# Patient Record
Sex: MALE | Race: OTHER | HISPANIC OR LATINO | Employment: OTHER | URBAN - METROPOLITAN AREA
[De-identification: names, ages, dates, MRNs, and addresses within clinical notes are randomized per-mention and may not be internally consistent; named-entity substitution may affect disease eponyms.]

---

## 2021-09-02 ENCOUNTER — APPOINTMENT (OUTPATIENT)
Dept: LAB | Facility: CLINIC | Age: 66
End: 2021-09-02
Payer: MEDICARE

## 2021-12-23 ENCOUNTER — HOSPITAL ENCOUNTER (OUTPATIENT)
Dept: RADIOLOGY | Facility: HOSPITAL | Age: 66
Discharge: HOME | End: 2021-12-23
Payer: MEDICARE

## 2021-12-23 ENCOUNTER — OFFICE VISIT (OUTPATIENT)
Dept: OBGYN CLINIC | Facility: CLINIC | Age: 66
End: 2021-12-23
Payer: MEDICARE

## 2021-12-23 VITALS — BODY MASS INDEX: 25.15 KG/M2 | HEIGHT: 67 IN | WEIGHT: 160.2 LBS

## 2021-12-23 DIAGNOSIS — M17.12 PRIMARY OSTEOARTHRITIS OF LEFT KNEE: ICD-10-CM

## 2021-12-23 DIAGNOSIS — M25.562 LEFT KNEE PAIN, UNSPECIFIED CHRONICITY: Primary | ICD-10-CM

## 2021-12-23 DIAGNOSIS — M25.562 LEFT KNEE PAIN, UNSPECIFIED CHRONICITY: ICD-10-CM

## 2021-12-23 PROCEDURE — 73562 X-RAY EXAM OF KNEE 3: CPT

## 2021-12-23 PROCEDURE — 20610 DRAIN/INJ JOINT/BURSA W/O US: CPT | Performed by: PHYSICIAN ASSISTANT

## 2021-12-23 PROCEDURE — 99203 OFFICE O/P NEW LOW 30 MIN: CPT | Performed by: PHYSICIAN ASSISTANT

## 2021-12-23 RX ORDER — ATORVASTATIN CALCIUM 80 MG/1
80 TABLET, FILM COATED ORAL
COMMUNITY
End: 2022-04-06

## 2021-12-23 RX ORDER — TRIAMCINOLONE ACETONIDE 40 MG/ML
80 INJECTION, SUSPENSION INTRA-ARTICULAR; INTRAMUSCULAR
Status: COMPLETED | OUTPATIENT
Start: 2021-12-23 | End: 2021-12-23

## 2021-12-23 RX ORDER — BUPIVACAINE HYDROCHLORIDE 5 MG/ML
2 INJECTION, SOLUTION EPIDURAL; INTRACAUDAL
Status: COMPLETED | OUTPATIENT
Start: 2021-12-23 | End: 2021-12-23

## 2021-12-23 RX ORDER — CYCLOBENZAPRINE HCL 10 MG
10 TABLET ORAL DAILY PRN
COMMUNITY
Start: 2021-11-12

## 2021-12-23 RX ORDER — ESCITALOPRAM OXALATE 20 MG/1
20 TABLET ORAL
COMMUNITY

## 2021-12-23 RX ORDER — BUPROPION HYDROCHLORIDE 150 MG/1
150 TABLET ORAL
COMMUNITY

## 2021-12-23 RX ORDER — LOSARTAN POTASSIUM 50 MG/1
100 TABLET ORAL
COMMUNITY

## 2021-12-23 RX ORDER — MELOXICAM 15 MG/1
15 TABLET ORAL
COMMUNITY
End: 2022-02-12 | Stop reason: SDUPTHER

## 2021-12-23 RX ORDER — LANOLIN ALCOHOL/MO/W.PET/CERES
3 CREAM (GRAM) TOPICAL
COMMUNITY

## 2021-12-23 RX ORDER — LIDOCAINE HYDROCHLORIDE 10 MG/ML
2 INJECTION, SOLUTION INFILTRATION; PERINEURAL
Status: COMPLETED | OUTPATIENT
Start: 2021-12-23 | End: 2021-12-23

## 2021-12-23 RX ORDER — AMLODIPINE BESYLATE 10 MG/1
10 TABLET ORAL EVERY EVENING
COMMUNITY

## 2021-12-23 RX ADMIN — BUPIVACAINE HYDROCHLORIDE 2 ML: 5 INJECTION, SOLUTION EPIDURAL; INTRACAUDAL at 15:52

## 2021-12-23 RX ADMIN — TRIAMCINOLONE ACETONIDE 80 MG: 40 INJECTION, SUSPENSION INTRA-ARTICULAR; INTRAMUSCULAR at 15:52

## 2021-12-23 RX ADMIN — LIDOCAINE HYDROCHLORIDE 2 ML: 10 INJECTION, SOLUTION INFILTRATION; PERINEURAL at 15:52

## 2022-02-12 ENCOUNTER — OFFICE VISIT (OUTPATIENT)
Dept: OBGYN CLINIC | Facility: CLINIC | Age: 67
End: 2022-02-12
Payer: MEDICARE

## 2022-02-12 ENCOUNTER — HOSPITAL ENCOUNTER (OUTPATIENT)
Dept: RADIOLOGY | Facility: HOSPITAL | Age: 67
Discharge: HOME | End: 2022-02-12
Payer: MEDICARE

## 2022-02-12 VITALS — HEART RATE: 105 BPM | OXYGEN SATURATION: 98 % | BODY MASS INDEX: 24.67 KG/M2 | WEIGHT: 157.2 LBS | HEIGHT: 67 IN

## 2022-02-12 DIAGNOSIS — M47.812 SPONDYLOSIS OF CERVICAL REGION WITHOUT MYELOPATHY OR RADICULOPATHY: ICD-10-CM

## 2022-02-12 DIAGNOSIS — M54.2 NECK PAIN: ICD-10-CM

## 2022-02-12 DIAGNOSIS — M54.2 NECK PAIN: Primary | ICD-10-CM

## 2022-02-12 DIAGNOSIS — M62.838 TRAPEZIUS MUSCLE SPASM: ICD-10-CM

## 2022-02-12 PROCEDURE — 72040 X-RAY EXAM NECK SPINE 2-3 VW: CPT

## 2022-02-12 PROCEDURE — 99213 OFFICE O/P EST LOW 20 MIN: CPT | Performed by: PHYSICIAN ASSISTANT

## 2022-02-12 RX ORDER — MELOXICAM 15 MG/1
15 TABLET ORAL DAILY
Qty: 30 TABLET | Refills: 0 | Status: SHIPPED | OUTPATIENT
Start: 2022-02-12 | End: 2022-04-06

## 2022-02-12 NOTE — PROGRESS NOTES
Assessment/Plan   Diagnoses and all orders for this visit:    Neck pain    Trapezius muscle spasm  -     Ambulatory Referral to Physical Therapy; Future  -     meloxicam refilled  -     Ambulatory Referral to Spine & Pain    Spondylosis of cervical region without myelopathy or radiculopathy          Subjective   Patient ID: José Manuel Underwood is a 77 y o  male  Vitals:    02/12/22 1125   Pulse: 105   SpO2: 98%     65yo male comes in for an evaluation of his right neck  There was no specific injury, but he started having pain 2 days after going bowling on 2-4-22  He c/o pain in the right side of the neck, right superior shoulder, and right scapular border  The pain is dull in character, mild in severity, pain does not radiate and is not associated with numbness  He has a history of a previous 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  The following portions of the patient's history were reviewed and updated as appropriate: allergies, current medications, past family history, past medical history, past social history, past surgical history and problem list     Review of Systems  Ortho Exam  Past Medical History:   Diagnosis Date    Hyperlipemia     Hypertension      Past Surgical History:   Procedure Laterality Date    ANTERIOR CRUCIATE LIGAMENT REPAIR Left 2001     No family history on file  Social History     Occupational History    Not on file   Tobacco Use    Smoking status: Never Smoker    Smokeless tobacco: Never Used   Vaping Use    Vaping Use: Never used   Substance and Sexual Activity    Alcohol use: Not on file    Drug use: Not on file    Sexual activity: Not on file       Review of Systems   Constitutional: Negative  HENT: Negative  Eyes: Negative  Respiratory: Negative  Cardiovascular: Negative  Gastrointestinal: Negative  Endocrine: Negative  Genitourinary: Negative  Musculoskeletal: As below      Allergic/Immunologic: Negative  Neurological: Negative  Hematological: Negative  Psychiatric/Behavioral: Negative  Objective   Physical Exam      I have personally reviewed pertinent films in PACS and my interpretation is OA and disc space narrowing at C56, C67  Anterolisthesis of C7 on T1 on xray       · Constitutional: Awake, Alert, Oriented  · Eyes: EOMI  · Psych: Mood and affect appropriate  · Heart: regular rate   · Lungs: No audible wheezing  · Abdomen: No guarding  · Lymph: no lymphedema       right Shoulder:  - Appearance   No swelling, discoloration, deformity, or ecchymosis  - Palpation   No tenderness to palpation of the clavicle, AC joint, biceps tendon, scapula, or proximal humerus, + tenderness: trapezius muscle   - ROM   Full and pain-free active ROM of the shoulder  - Motor   Internal and external rotation 5/5 with shoulder at side, flexion 5/5  - NVI distally         right Neck / CSpine:  - Appearance   No rash, erythema, or ecchymosis  - Palpation   + tenderness to palpation of the Trapezius  - ROM   Full, pain-free active ROM in rotation and in horizontal flexion    ROM limited by pain in flexion and extension   - Motor   5/5 in all UE myotomes  - Sensation   Intact to light touch in all UE dermatomes  - Special Tests   Spurlings negative

## 2022-02-17 ENCOUNTER — EVALUATION (OUTPATIENT)
Dept: PHYSICAL THERAPY | Facility: CLINIC | Age: 67
End: 2022-02-17
Payer: MEDICARE

## 2022-02-17 DIAGNOSIS — M47.812 SPONDYLOSIS OF CERVICAL REGION WITHOUT MYELOPATHY OR RADICULOPATHY: ICD-10-CM

## 2022-02-17 DIAGNOSIS — M62.838 TRAPEZIUS MUSCLE SPASM: ICD-10-CM

## 2022-02-17 DIAGNOSIS — M54.2 NECK PAIN: Primary | ICD-10-CM

## 2022-02-17 PROCEDURE — 97110 THERAPEUTIC EXERCISES: CPT | Performed by: PHYSICAL THERAPIST

## 2022-02-17 PROCEDURE — 97161 PT EVAL LOW COMPLEX 20 MIN: CPT | Performed by: PHYSICAL THERAPIST

## 2022-02-17 NOTE — PROGRESS NOTES
PT Evaluation     Today's date: 2022  Patient name: Sabina Bledsoe  : 1955  MRN: [id number]  Referring provider: Abdon Tanner  Dx:   Encounter Diagnosis     ICD-10-CM    1  Neck pain  M54 2 Ambulatory Referral to Physical Therapy   2  Trapezius muscle spasm  M62 838 Ambulatory Referral to Physical Therapy   3  Spondylosis of cervical region without myelopathy or radiculopathy  M47 812 Ambulatory Referral to Physical Therapy       Start Time: 1800  Stop Time: 1835  Total time in clinic (min): 35 minutes  Assessment  Assessment details: Sabina Bledsoe is a 77 y o  male who presents with complaints of Neck pain  (primary encounter diagnosis)  Trapezius muscle spasm  Spondylosis of cervical region without myelopathy or radiculopathy  No further referral appears necessary at this time based upon examination results  Patient is presenting with rib dysfunction leading to limitations with carrying items, looking in different directions, and sleeping  Prognosis is good given HEP compliance and PT 1-2/wk  Positive prognostic indicators include positive attitude toward recovery  Please contact me if you have any questions or recommendations  Thank you for the opportunity to share in Robin's care  Impairments: abnormal muscle firing, abnormal or restricted ROM, abnormal movement, impaired physical strength, lacks appropriate home exercise program, pain with function and poor posture     Symptom irritability: lowUnderstanding of Dx/Px/POC: good   Prognosis: good    Plan  Patient would benefit from: skilled physical therapy  Planned therapy interventions: joint mobilization, manual therapy, patient education, postural training, strengthening, stretching, therapeutic activities, therapeutic exercise, home exercise program, neuromuscular re-education, flexibility and functional ROM exercises  Frequency: 1-2x    Duration in weeks: 8  Treatment plan discussed with: patient        Subjective Evaluation    History of Present Illness  Mechanism of injury: Patient reports going bowling one day and irritated his shoulder blade in the right side which has now progressed to right side of his neck that can radiate to right supraspinatus region  Patient RHD  Had pain with lifting his arm  In , patient reports that he was lifting equipment and felt a pop in his upper CT-junction region  Symptoms started 2 weeks ago  Symptoms slowly improving at this time  Sleep disturbances reported  Patient denies dizziness, dsyphagia, dysarthria, diplopia, drop attacks, nystagmus, facial numbness, nor nausea  Occasional trouble driving  1 occurrence of headache reported yesterday  Symptoms described as sharp, dull, ache  Pain reported with inhalation  Not a recurrent problem   Quality of life: good    Pain  Current pain ratin  At best pain rating: 3  At worst pain ratin  Progression: no change      Diagnostic Tests  X-ray: normal  Treatments  Previous treatment: medication  Patient Goals  Patient goals for therapy: decreased pain and increased strength  Patient goal: no pain with carrying items or sleeping      Objective    Cervical % of normal   Flex  50p! Extn  100   SB Left 75   SB Right 75   ROT Left 75   ROT Right 50p! Repetitive testing: extension= no effect             MMT         AROM          PROM    Shoulder       R       L        R          L        R        L   Flex  5 5 WNL WNL     Extn  5 5 WNL WNL     Abd  5 5 WNL WNL     Add  5 5       IR  5 5       ER  5 5 WNL WNL     Behind back IR   L1 T8              Low Trap 4 4+       Mid Trap 4- 4+                             MMT    Elbow       R       L   Flex  5 5   Extn  5 5   Sup  5 5   Pron  5 5              MMT    Wrist       R         L   Flex  5 5   Extn  5 5    bent 5 5     Head positioning:  WNL  Palpation:  Ribs 2-3 right  Scapular positioning: bilateral abduction    No cervical special testing performed    Cervical joint mobility: deferred    Thoracic mobility: decreased upper thoracic    Rib mobility: right 2nd and 3rd rib hypomobility      Insurance:  AMA/CMS Eval/ Re-eval POC expires Beckie Reyna #/ Referral # Total    Start date  Expiration date Extension  Visit limitation? PT only or  PT+OT? Co-Insurance   CMS 2 17 22 4 14 22                                            AUTH #:  Date          Authed: Used           Remaining             Precautions: HTN  Patient provided verbal consent to treatment plan, grade 5 mobilization, and recommended interventions  Manuals 2 17 22        Visit  1        2nd and 3rd rib p/a gr  5 Right FB                          Neuro Re-Ed                                                                        Ther Ex         Pt edu FB        P/a rib roll 2*10        scap retrac 10x                                                     Ther Activity                             Short Term:  1  Pt will report decreased levels of pain by at least 2 subjective ratings in 4 weeks  2  Pt will demonstrate improved ROM by at least 10 degrees in 4 weeks  3  Pt will demonstrate improved strength by 1/2 grade in 4 weeks  4  Pt will be able to perform head movements in 4 weeks  Long Term:   1  Pt will be independent in their HEP in 8 weeks  2  Pt will be pain free with IADL's in 8 weeks

## 2022-02-21 ENCOUNTER — OFFICE VISIT (OUTPATIENT)
Dept: PHYSICAL THERAPY | Facility: CLINIC | Age: 67
End: 2022-02-21
Payer: MEDICARE

## 2022-02-21 DIAGNOSIS — M62.838 TRAPEZIUS MUSCLE SPASM: ICD-10-CM

## 2022-02-21 DIAGNOSIS — M54.2 NECK PAIN: ICD-10-CM

## 2022-02-21 DIAGNOSIS — M47.812 SPONDYLOSIS OF CERVICAL REGION WITHOUT MYELOPATHY OR RADICULOPATHY: ICD-10-CM

## 2022-02-21 DIAGNOSIS — M54.50 ACUTE RIGHT-SIDED LOW BACK PAIN WITHOUT SCIATICA: Primary | ICD-10-CM

## 2022-02-21 PROCEDURE — 97112 NEUROMUSCULAR REEDUCATION: CPT | Performed by: PHYSICAL THERAPIST

## 2022-02-21 PROCEDURE — 97140 MANUAL THERAPY 1/> REGIONS: CPT | Performed by: PHYSICAL THERAPIST

## 2022-02-21 PROCEDURE — 97110 THERAPEUTIC EXERCISES: CPT | Performed by: PHYSICAL THERAPIST

## 2022-02-21 NOTE — PROGRESS NOTES
Daily Note     Today's date: 2022  Patient name: Jose C Caldwell  : 1955  MRN: [id number]  Referring provider: Monika Martinez  Dx:   Encounter Diagnosis     ICD-10-CM    1  Neck pain  M54 2    2  Trapezius muscle spasm  M62 838    3  Spondylosis of cervical region without myelopathy or radiculopathy  M47 812                   Subjective: Patient reports doing better overall but is having some pain his shoulder blade area  Reports right sided low back pain when standing and with stair navigation  Patient is reporting central low back pain when bending forward  Objective: See treatment diary below  GAIT: WNL  Squat assess: WNL  Heel toe walk: -    Lumbar  % of normal   Flex  50p! Extn  50   SB Left 75p! SB Right 100   ROT Left 100   ROT Right 100           MMT    Hip       L       R   Flex  5 5   Extn  4 4   Abd  5 5   Add  5 5   IR  5 5   ER  5 5                 MMT    Knee         L        R   Flex  5 5   Extn  5 5                     MMT    Ankle       L        R   PF 5 5   DF  5 5   EHL 5 5   Inv  5 5   Ever  5 5     Posture: reduced lumbar lordosis    Slump test: L=  -   R=    -   Straight leg raise:   L= -    R=   -             Transverse abdominis: Bent knee fall out= poor     Hip/SI joint:    Active SLR=  +  Active SLR with stabilization =  +  [place] scan= + Left anterior innominate      Multifidus activation = fair     Joint mobility = decreased L5-S1      Assessment: Tolerated treatment well  Patient is presenting with underlying core weakness leading to low back symptoms at this time  He reported improvement in scapular symptoms post session  Jose C Caldwell is a 77 y o  male who presents with complaints of   No further referral appears necessary at this time based upon examination results  Patient is limited with completing ADLs, performing positional changes, and standing/walking  Prognosis is good given HEP compliance and PT 1-2/wk    Positive prognostic indicators include positive attitude toward recovery  Please contact me if you have any questions or recommendations  Thank you for the opportunity to share in Robin's care  Plan: 4-6 weeks, 1 -2 visits per week  Insurance:  AMA/CMS Eval/ Re-eval POC expires Winter Bernard #/ Referral # Total    Start date  Expiration date Extension  Visit limitation? PT only or  PT+OT? Co-Insurance   CMS 2 17 22 4 14 22                                            AUTH #:  Date          Authed: Used           Remaining             Precautions: HTN  Patient provided verbal consent to treatment plan, grade 5 mobilization, and recommended interventions  Manuals 2 17 22 2 21 22       Visit  1 2       2nd and 3rd rib p/a gr  5 Right FB        Ribs 6 right gr  5  FB       T6-8 gr  5  FB       Rib 6-7 taping  FB                         Neuro Re-Ed         TrA iso   2*10, 5"       TrA BKFO  3*10                                                    Ther Ex         Pt edu FB        P/a rib roll 2*10 2*10       scap retrac 10x 10x       rows  3*10 GTB                                           Ther Activity                             Short Term:  1  Pt will report decreased levels of pain by at least 2 subjective ratings in 4 weeks  2  Pt will demonstrate improved ROM by at least 10 degrees in 4 weeks  3  Pt will demonstrate improved strength by 1/2 grade in 4 weeks  4  Pt will be able to perform head movements in 4 weeks  Long Term:   1  Pt will be independent in their HEP in 8 weeks  2  Pt will be pain free with IADL's in 8 weeks

## 2022-02-23 ENCOUNTER — OFFICE VISIT (OUTPATIENT)
Dept: GASTROENTEROLOGY | Facility: CLINIC | Age: 67
End: 2022-02-23
Payer: MEDICARE

## 2022-02-23 VITALS — HEIGHT: 67 IN | WEIGHT: 155 LBS | BODY MASS INDEX: 24.33 KG/M2

## 2022-02-23 DIAGNOSIS — K21.00 GASTROESOPHAGEAL REFLUX DISEASE WITH ESOPHAGITIS WITHOUT HEMORRHAGE: Primary | ICD-10-CM

## 2022-02-23 DIAGNOSIS — K63.5 POLYP OF COLON, UNSPECIFIED PART OF COLON, UNSPECIFIED TYPE: ICD-10-CM

## 2022-02-23 PROBLEM — Z12.11 COLON CANCER SCREENING: Status: ACTIVE | Noted: 2022-02-23

## 2022-02-23 PROCEDURE — 99203 OFFICE O/P NEW LOW 30 MIN: CPT | Performed by: INTERNAL MEDICINE

## 2022-02-23 RX ORDER — SODIUM, POTASSIUM,MAG SULFATES 17.5-3.13G
SOLUTION, RECONSTITUTED, ORAL ORAL
Qty: 177 ML | Refills: 0 | Status: SHIPPED | OUTPATIENT
Start: 2022-02-23 | End: 2022-04-06

## 2022-02-23 RX ORDER — LIDOCAINE 50 MG/G
PATCH TOPICAL
COMMUNITY
Start: 2021-08-31

## 2022-02-23 RX ORDER — ASPIRIN 81 MG/1
TABLET ORAL
COMMUNITY
Start: 2021-08-31

## 2022-02-23 RX ORDER — MONTELUKAST SODIUM 10 MG/1
10 TABLET ORAL
COMMUNITY

## 2022-02-23 NOTE — PROGRESS NOTES
[facility] - Outpatient Consultation  Mirela Carpio 77 y o  male MRN: [id number]  Encounter: [id number]          ASSESSMENT AND PLAN:      1  Gastroesophageal reflux disease with esophagitis without hemorrhage  Intermittent episodes had esophagitis in the past underwent upper endoscopy elsewhere over 5 years ago was told to return  Denies any dysphagia melena bright red blood per rectum  - EGD; Future    2  Polyp of colon, unspecified part of colon, unspecified type  Distant history of polyps type unknown colonoscopies done elsewhere  He is overdue for his colonoscopy his last was greater than 5 years ago  Denies any bright red blood per rectum abdominal pain  He will otherwise follow-up with us after his testing   - Colonoscopy; Future    ______________________________________________________________________    HPI:  Very pleasant 69-year-old   presents to establish new gastroenterology follow-up  He is new to the area was followed elsewhere  He had EGD for gastroesophageal reflux and a colonoscopy at the same time over 5 years ago  He was told to return in 5 years  He apparently had esophagitis and chronic reflux and has a history of colon polyps  No family history of cancer of the esophagus stomach gallbladder liver pancreas colon  No family history of celiac disease inflammatory bowel disease liver disease or pancreatitis  Has no other particular complaints  Does have a history of having had a coronary artery bypass several years ago has been doing very well since then  He is establishing follow-up with a new cardiologist   No recent labs  Lipids and CMP from September of last year relatively unremarkable  REVIEW OF SYSTEMS:    CONSTITUTIONAL: Denies any fever, chills, rigors, and weight loss  HEENT: No earache or tinnitus  Denies hearing loss or visual disturbances  CARDIOVASCULAR: No chest pain or palpitations     RESPIRATORY: Denies any cough, hemoptysis, shortness of breath or dyspnea on exertion  GASTROINTESTINAL: As noted in the History of Present Illness  GENITOURINARY: No problems with urination  Denies any hematuria or dysuria  NEUROLOGIC: No dizziness or vertigo, denies headaches  MUSCULOSKELETAL: Denies any muscle or joint pain  SKIN: Denies skin rashes or itching  ENDOCRINE: Denies excessive thirst  Denies intolerance to heat or cold  PSYCHOSOCIAL: Denies depression or anxiety  Denies any recent memory loss  Historical Information   Past Medical History:   Diagnosis Date    Hyperlipemia     Hypertension      Past Surgical History:   Procedure Laterality Date    ANTERIOR CRUCIATE LIGAMENT REPAIR Left 2001     Social History   Social History     Substance and Sexual Activity   Alcohol Use Never     Social History     Substance and Sexual Activity   Drug Use Never     Social History     Tobacco Use   Smoking Status Never Smoker   Smokeless Tobacco Never Used     History reviewed  No pertinent family history  Meds/Allergies       Current Outpatient Medications:     aspirin (ECOTRIN LOW STRENGTH) 81 mg EC tablet    hydrocortisone 2 5 % cream    lidocaine (LIDODERM) 5 %    losartan (COZAAR) 50 mg tablet    melatonin 3 mg    meloxicam (MOBIC) 15 mg tablet    metoprolol tartrate (LOPRESSOR) 25 mg tablet    montelukast (SINGULAIR) 10 mg tablet    amLODIPine (NORVASC) 10 mg tablet    atorvastatin (LIPITOR) 80 mg tablet    buPROPion (WELLBUTRIN XL) 150 mg 24 hr tablet    cyclobenzaprine (FLEXERIL) 10 mg tablet    escitalopram (LEXAPRO) 20 mg tablet    Allergies   Allergen Reactions    Bee Pollen Other (See Comments)     Itchy eyes/nose, sneezing, clogged ears, increased cough/chest tightness   Moxifloxacin GI Intolerance     W/ GI bleed      Pollen Extract Other (See Comments)     Itchy eyes/nose, sneezing, clogged ears, increased cough/chest tightness      Lisinopril Cough           Objective     Height 5' 6 5" (1 689 m), weight 70 3 kg (155 lb)  Body mass index is 24 64 kg/m²  PHYSICAL EXAM:      General Appearance:   Alert, cooperative, no distress   HEENT:   Normocephalic, atraumatic, anicteric      Neck:  Supple, symmetrical, trachea midline   Lungs:   Clear to auscultation bilaterally; no rales, rhonchi or wheezing; respirations unlabored , well-healed thoracotomy scar  Heart[de-identified]   Regular rate and rhythm; no murmur, rub, or gallop  Abdomen:   Soft, non-tender, non-distended; normal bowel sounds; no masses, no organomegaly    Genitalia:   Deferred    Rectal:   Deferred    Extremities:  No cyanosis, clubbing or edema    Pulses:  2+ and symmetric    Skin:  No jaundice, rashes, or lesions    Lymph nodes:  No palpable cervical lymphadenopathy        Lab Results:   No visits with results within 1 Day(s) from this visit  Latest known visit with results is:   Transcribe Orders on 09/02/2021   Component Date Value    Sodium 09/02/2021 142     Potassium 09/02/2021 4 3     Chloride 09/02/2021 111*    CO2 09/02/2021 29     ANION GAP 09/02/2021 2*    BUN 09/02/2021 17     Creatinine 09/02/2021 0 88     Glucose, Fasting 09/02/2021 93     Calcium 09/02/2021 8 9     AST 09/02/2021 22     ALT 09/02/2021 35     Alkaline Phosphatase 09/02/2021 83     Total Protein 09/02/2021 7 3     Albumin 09/02/2021 3 6     Total Bilirubin 09/02/2021 0 50     eGFR 09/02/2021 90     Cholesterol 09/02/2021 125     Triglycerides 09/02/2021 64     HDL, Direct 09/02/2021 44     LDL Calculated 09/02/2021 68     Non-HDL-Chol (CHOL-HDL) 09/02/2021 81          Radiology Results:   XR spine cervical 2 or 3 vw injury    Result Date: 2/17/2022  Narrative: CERVICAL SPINE INDICATION:   M54 2: Cervicalgia  COMPARISON:  None VIEWS:  XR SPINE CERVICAL 2 OR 3 VW INJURY FINDINGS: No fracture or subluxation  There is straightening of the normal cervical lordosis  There is mild anterolisthesis of C7-T1   There is disc space narrowing at C5-C6 and C6-C7 with ventral osteophytosis  There is multilevel facet arthrosis  The prevertebral soft tissues are within normal limits  The lung apices are clear  Status post sternotomy  Impression: No acute osseous abnormality  Degenerative changes as above   Workstation performed: [id number]

## 2022-02-23 NOTE — PATIENT INSTRUCTIONS
Scheduled date of EGD/colonoscopy (as of today):  4/13/22  Physician performing EGD/colonoscopy: Cliff Sena  Location of EGD/colonoscopy: [facility]  Desired bowel prep reviewed with patient: Suprep  Instructions reviewed with patient by: Raquel   Clearances:  N/A

## 2022-02-24 ENCOUNTER — OFFICE VISIT (OUTPATIENT)
Dept: PHYSICAL THERAPY | Facility: CLINIC | Age: 67
End: 2022-02-24
Payer: MEDICARE

## 2022-02-24 DIAGNOSIS — M47.812 SPONDYLOSIS OF CERVICAL REGION WITHOUT MYELOPATHY OR RADICULOPATHY: ICD-10-CM

## 2022-02-24 DIAGNOSIS — M62.838 TRAPEZIUS MUSCLE SPASM: ICD-10-CM

## 2022-02-24 DIAGNOSIS — M54.2 NECK PAIN: Primary | ICD-10-CM

## 2022-02-24 DIAGNOSIS — M54.50 ACUTE RIGHT-SIDED LOW BACK PAIN WITHOUT SCIATICA: ICD-10-CM

## 2022-02-24 PROCEDURE — 97110 THERAPEUTIC EXERCISES: CPT | Performed by: PHYSICAL THERAPIST

## 2022-02-24 PROCEDURE — 97112 NEUROMUSCULAR REEDUCATION: CPT | Performed by: PHYSICAL THERAPIST

## 2022-02-24 NOTE — PROGRESS NOTES
Daily Note     Today's date: 2022  Patient name: Oneyda Floor  : 1955  MRN: [id number]  Referring provider: Jareth Jerez  Dx:   Encounter Diagnosis     ICD-10-CM    1  Neck pain  M54 2    2  Trapezius muscle spasm  M62 838    3  Spondylosis of cervical region without myelopathy or radiculopathy  M47 812    4  Acute right-sided low back pain without sciatica  M54 50               Subjective: Patient reports doing much better overall  Reports some achiness in low back 3/10  Objective: See treatment diary below     Assessment: Tolerated treatment well  Patient reported feeling good post treatment today  Cueing needed to avoid pelvic rotation with supine march exercise  Added pallof press and row to HEP  Patient will be away for 2 weeks in [place] and will update me with progress via email  Plan: Continue per plan of care  Insurance:  AMA/CMS Eval/ Re-eval POC expires Val Childs #/ Referral # Total    Start date  Expiration date Extension    CMS 2 17 22 4 14 22  None                      AUTH #:  Date          Authed: Used           Remaining             Precautions: HTN  Patient provided verbal consent to treatment plan, grade 5 mobilization, and recommended interventions  Manuals 2 17 22 2 21 22 2 24 22      Visit  1 2 3      2nd and 3rd rib p/a gr  5 Right FB        Ribs 6 right gr  5  FB       T6-8 gr  5  FB       Rib 6-7 taping  FB                         Neuro Re-Ed         TrA iso   2*10, 5" np      TrA BKFO  3*10 3*10      TrA march   3*10      Bridge   2*10      pallof Press   2*10 ea   GTB                        Ther Ex         Pt edu FB        P/a rib roll 2*10 2*10 np      scap retrac 10x 10x HEP      rows  3*10 GTB 3*10 Blue TB      shld extn   3*10 GTB                                 Ther Activity

## 2022-02-25 ENCOUNTER — APPOINTMENT (OUTPATIENT)
Dept: PHYSICAL THERAPY | Facility: CLINIC | Age: 67
End: 2022-02-25
Payer: MEDICARE

## 2022-03-15 ENCOUNTER — TELEPHONE (OUTPATIENT)
Dept: OTHER | Facility: OTHER | Age: 67
End: 2022-03-15

## 2022-03-15 NOTE — TELEPHONE ENCOUNTER
Patient wife called and canceled her  appointment because conflict of appointment and is asking if the office can give her back a call to reschedule her  appointment

## 2022-03-24 ENCOUNTER — OFFICE VISIT (OUTPATIENT)
Dept: OBGYN CLINIC | Facility: CLINIC | Age: 67
End: 2022-03-24
Payer: MEDICARE

## 2022-03-24 VITALS
DIASTOLIC BLOOD PRESSURE: 67 MMHG | HEART RATE: 88 BPM | WEIGHT: 160.2 LBS | BODY MASS INDEX: 25.15 KG/M2 | HEIGHT: 67 IN | SYSTOLIC BLOOD PRESSURE: 125 MMHG

## 2022-03-24 DIAGNOSIS — M17.12 PRIMARY OSTEOARTHRITIS OF LEFT KNEE: Primary | ICD-10-CM

## 2022-03-24 DIAGNOSIS — Z98.890 HISTORY OF REPAIR OF ACL: ICD-10-CM

## 2022-03-24 PROCEDURE — 99203 OFFICE O/P NEW LOW 30 MIN: CPT | Performed by: ORTHOPAEDIC SURGERY

## 2022-03-24 NOTE — PROGRESS NOTES
Assessment/Plan:  1  Primary osteoarthritis of left knee     2  History of repair of ACL         Scribe Attestation    I,:  Geeta Dhillon am acting as a scribe while in the presence of the attending physician :       I,:  Tian Thomson, DO personally performed the services described in this documentation    as scribed in my presence :             Upon review of the x-rays and my physical examination Shoshana Carter is presenting with signs and symptoms consistent with left knee osteoarthritis that is asymptomatic at this time  His knee is stable to examination  There is no tenderness  The knee is nonswollen and he has no complaints of pain  Moving forward I will gladly manage his osteoarthritis for him  Should he develop a recurrence of knee pain we could certainly were provided another steroid injection due to success full treatment previously  Is steroid injection is unsuccessful we can consider joint lubricating injections  The patient was amenable to this treatment plan  He can follow-up with me as needed  Subjective:   Lori Fernandes is a 77 y o  male who presents to the office today for evaluation of his left knee  Shoshana Carter has a known history of left knee osteoarthritis as well as a history of an ACL reconstruction performed nearly 20 years ago  On December 23rd he was evaluated by Rachel Teague PA-C who diagnosed Shoshana Carter with knee osteoarthritis and provided him a cortisone injection  Shoshana Carter states that since he had his injection his knee pain has decreased and nearly resolved  His only complaint is of a mild ache about the knee when poor weather develops  He denies crepitus  He denies instability of the knee  Daily activities such as ascending and descending stairs are nonpainful  Prolonged standing or walking also are non painful  Review of Systems   Constitutional: Negative for chills, fever and unexpected weight change  HENT: Negative for hearing loss, nosebleeds and sore throat      Eyes: Negative for pain, redness and visual disturbance  Respiratory: Negative for cough, shortness of breath and wheezing  Cardiovascular: Negative for chest pain, palpitations and leg swelling  Gastrointestinal: Negative for abdominal pain, nausea and vomiting  Endocrine: Negative for polyphagia and polyuria  Genitourinary: Negative for dysuria and hematuria  Musculoskeletal:        See HPI   Skin: Negative for rash and wound  Neurological: Negative for dizziness, numbness and headaches  Psychiatric/Behavioral: Negative for decreased concentration and suicidal ideas  The patient is not nervous/anxious  Past Medical History:   Diagnosis Date    Hyperlipemia     Hypertension        Past Surgical History:   Procedure Laterality Date    ANTERIOR CRUCIATE LIGAMENT REPAIR Left 2001       History reviewed  No pertinent family history  Social History     Occupational History    Not on file   Tobacco Use    Smoking status: Never Smoker    Smokeless tobacco: Never Used   Vaping Use    Vaping Use: Never used   Substance and Sexual Activity    Alcohol use: Never    Drug use: Never    Sexual activity: Not on file         Current Outpatient Medications:     amLODIPine (NORVASC) 10 mg tablet, Take 10 mg by mouth  , Disp: , Rfl:     aspirin (ECOTRIN LOW STRENGTH) 81 mg EC tablet, TAKE ONE TABLET BY MOUTH DAILY FOR THE HEART , Disp: , Rfl:     hydrocortisone 2 5 % cream, hydrocortisone 2 5 % topical cream  Apply to forehead twice a day for 3 weeks only, Disp: , Rfl:     lidocaine (LIDODERM) 5 %, APPLY 1 PATCH ON SKIN DAILY FOR PAIN  DO NOT WEAR FOR LONGER THAN 12 HOURS IN ANY 24 HOUR PERIOD   [facility] YOUR HANDS AFTER APPLYING A PATCH , Disp: , Rfl:     losartan (COZAAR) 50 mg tablet, Take 50 mg by mouth, Disp: , Rfl:     melatonin 3 mg, Take 3 mg by mouth, Disp: , Rfl:     meloxicam (MOBIC) 15 mg tablet, Take 1 tablet (15 mg total) by mouth daily, Disp: 30 tablet, Rfl: 0    montelukast (SINGULAIR) 10 mg tablet, Take 10 mg by mouth, Disp: , Rfl:     atorvastatin (LIPITOR) 80 mg tablet, Take 80 mg by mouth, Disp: , Rfl:     buPROPion (WELLBUTRIN XL) 150 mg 24 hr tablet, Take 150 mg by mouth (Patient not taking: Reported on 2/23/2022 ), Disp: , Rfl:     cyclobenzaprine (FLEXERIL) 10 mg tablet, Take 10 mg by mouth daily at bedtime (Patient not taking: Reported on 2/23/2022 ), Disp: , Rfl:     escitalopram (LEXAPRO) 20 mg tablet, Take 20 mg by mouth (Patient not taking: Reported on 2/23/2022 ), Disp: , Rfl:     metoprolol tartrate (LOPRESSOR) 25 mg tablet, Take 25 mg by mouth, Disp: , Rfl:     Na Sulfate-K Sulfate-Mg Sulf (Suprep Bowel Prep Kit) 17 5-3 13-1 6 GM/177ML SOLN, Take 1 bottle by mouth followed by copious water intake the 2nd bottle by mouth the day before colonoscopy (Patient not taking: Reported on 3/24/2022 ), Disp: 177 mL, Rfl: 0    Allergies   Allergen Reactions    Bee Pollen Other (See Comments)     Itchy eyes/nose, sneezing, clogged ears, increased cough/chest tightness   Moxifloxacin GI Intolerance     W/ GI bleed      Pollen Extract Other (See Comments)     Itchy eyes/nose, sneezing, clogged ears, increased cough/chest tightness   Lisinopril Cough       Objective:  Vitals:    03/24/22 1104   BP: 125/67   Pulse: 88       Left Knee Exam     Muscle Strength   The patient has normal left knee strength  Tenderness   The patient is experiencing no tenderness  Range of Motion   Extension: 0   Flexion: 130     Tests   Stephanie:  Medial - negative Lateral - negative  Varus: negative Valgus: negative  Drawer:  Anterior - negative     Posterior - negative    Other   Erythema: absent  Scars: present  Sensation: normal  Swelling: none  Effusion: no effusion present          Observations   Left Knee   Negative for effusion  Physical Exam  Vitals reviewed  Constitutional:       Appearance: He is well-developed  HENT:      Head: Normocephalic and atraumatic     Eyes: General:         Right eye: No discharge  Left eye: No discharge  Conjunctiva/sclera: Conjunctivae normal    Cardiovascular:      Rate and Rhythm: Regular rhythm  Pulmonary:      Effort: Pulmonary effort is normal  No respiratory distress  Musculoskeletal:      Cervical back: Normal range of motion and neck supple  Left knee: No effusion  Instability Tests: Medial Stephanie test negative and lateral Stephanie test negative  Skin:     General: Skin is warm and dry  Neurological:      Mental Status: He is alert and oriented to person, place, and time  Psychiatric:         Behavior: Behavior normal          I have personally reviewed pertinent films in PACS and my interpretation is as follows:  X-rays of the left knee from December 23, 2021 were reviewed and demonstrate mild tricompartmental osteoarthritis  There is evidence of chondrocalcinosis

## 2022-04-06 ENCOUNTER — CONSULT (OUTPATIENT)
Dept: PAIN MEDICINE | Facility: CLINIC | Age: 67
End: 2022-04-06
Payer: MEDICARE

## 2022-04-06 VITALS
BODY MASS INDEX: 25.76 KG/M2 | WEIGHT: 162 LBS | HEART RATE: 84 BPM | SYSTOLIC BLOOD PRESSURE: 146 MMHG | DIASTOLIC BLOOD PRESSURE: 73 MMHG

## 2022-04-06 DIAGNOSIS — M54.2 NECK PAIN: ICD-10-CM

## 2022-04-06 DIAGNOSIS — M51.16 LUMBAR DISC DISEASE WITH RADICULOPATHY: ICD-10-CM

## 2022-04-06 DIAGNOSIS — M50.322 OTHER CERVICAL DISC DEGENERATION AT C5-C6 LEVEL: ICD-10-CM

## 2022-04-06 DIAGNOSIS — G89.4 CHRONIC PAIN SYNDROME: Primary | ICD-10-CM

## 2022-04-06 DIAGNOSIS — M47.816 LUMBAR SPONDYLOSIS: ICD-10-CM

## 2022-04-06 DIAGNOSIS — M47.812 CERVICAL SPONDYLOSIS: ICD-10-CM

## 2022-04-06 PROCEDURE — 99204 OFFICE O/P NEW MOD 45 MIN: CPT | Performed by: PHYSICAL MEDICINE & REHABILITATION

## 2022-04-06 RX ORDER — MELOXICAM 15 MG/1
15 TABLET ORAL DAILY
Qty: 30 TABLET | Refills: 1 | Status: SHIPPED | OUTPATIENT
Start: 2022-04-06 | End: 2022-05-05 | Stop reason: ALTCHOICE

## 2022-04-06 NOTE — PATIENT INSTRUCTIONS
Lumbar Radiculopathy   WHAT YOU NEED TO KNOW:   Lumbar radiculopathy is a painful condition that happens when a nerve in your lumbar spine (lower back) is pinched or irritated  Nerves control feeling and movement in your body  You may have numbness or pain that shoots down from your lower back towards your foot  DISCHARGE INSTRUCTIONS:   Medicines:   · Medicines:     ? NSAIDs , such as ibuprofen, help decrease swelling, pain, and fever  This medicine is available with or without a doctor's order  NSAIDs can cause stomach bleeding or kidney problems in certain people  If you take blood thinner medicine, always ask your healthcare provider if NSAIDs are safe for you  Always read the medicine label and follow directions  ? Muscle relaxers  help decrease pain and muscle spasms  ? Opioids: This is a strong medicine given to reduce severe pain  It is also called narcotic pain medicine  Take this medicine exactly as directed by your healthcare provider  ? Oral steroids: Steroids may also be given to reduce pain and swelling  ? Take your medicine as directed  Contact your healthcare provider if you think your medicine is not helping or if you have side effects  Tell him of her if you are allergic to any medicine  Keep a list of the medicines, vitamins, and herbs you take  Include the amounts, and when and why you take them  Bring the list or the pill bottles to follow-up visits  Carry your medicine list with you in case of an emergency  Follow up with your healthcare provider or spine specialist within 1 to 3 weeks:  After your first follow-up appointment, return to your healthcare provider or spine specialist every 2 weeks until you have healed  Ask for information about physical therapy for your condition  Write down your questions so you remember to ask them during your visits  Physical therapy:  You may need physical therapy to improve your condition   Your physical therapist may teach you certain exercises to improve posture (the way you stand and sit), flexibility, and strength in your lower back  Self care:   · Stay active: It is best to be active when you have lumbar radiculopathy  Your physical therapist or healthcare provider may tell you to take walks to ease yourself back into your daily routine  Avoid long periods of bed rest  Bed rest could worsen your symptoms  Do not move in ways that increase your pain  Ask for more information about the best ways to stay active  · Use ice or heat packs:  Use ice or heat packs as directed on the sore area of your body to decrease the pain and swelling  Put ice in a plastic bag covered with a towel on your low back  Cover heated items with a towel to avoid burns  Use ice and heat as directed  · Avoid heavy lifting: Your condition may worsen if you lift heavy things  Avoid lifting if possible  · Maintain a healthy weight:  Excess body weight may strain your back  Talk with your healthcare provider about ways to lose excess weight if you are overweight  Contact your healthcare provider or spine specialist if:   · Your pain does not improve within 1 to 3 weeks after treatment  · Your pain and weakness keep you from your normal activities at work, home, or school  · You lose more than 10 pounds in 6 months without trying  · You become depressed or sad because of the pain  · You have questions or concerns about your condition or care  Return to the emergency department if:   · You have a fever greater than 100 4°F for longer than 2 days  · You have new, severe back or leg pain, or your pain spreads to both legs  · You have any new signs of numbness or weakness, especially in your lower back, legs, arms, or genital area  · You have new trouble controlling your urine and bowel movements  · You do not feel like your bladder empties when you urinate      © Copyright [employer] 2022 Information is for End User's use only and may not be sold, redistributed or otherwise used for commercial purposes  All illustrations and images included in CareNotes® are the copyrighted property of [employer]  or Jeanne Mcclellan  The above information is an  only  It is not intended as medical advice for individual conditions or treatments  Talk to your doctor, nurse or pharmacist before following any medical regimen to see if it is safe and effective for you

## 2022-04-06 NOTE — PROGRESS NOTES
Assessment  1  Chronic pain syndrome    2  Neck pain    3  Cervical spondylosis    4  Other cervical disc degeneration at C5-C6 level    5  Lumbar disc disease with radiculopathy    6  Lumbar spondylosis        Plan  Mr Tashi Thakkar is a pleasant 60-year-old male significant past medical history of lumbar radiculopathy that was managed at a previous pain practice with L2-L3 lumbar epidurals presents for initial evaluation regarding chronic neck and low back pain  He has already participated in greater than 6 weeks of physical therapy and physician guided home exercises in the last 6 months with minimal relief in his ongoing pain  During today's evaluation he is demonstrating clinical and diagnostic evidence of cervical spondylosis, facet mediated pain and radiculopathy into the right shoulder as well as lumbar radiculopathy with radiating symptoms into the bilateral hips  However, no recent diagnostic imaging has been done  At this time we will   1  Provide the patient with blocks Cam 15 mg daily for pain  2  Will order lumbar MRI without contrast to better identify disc and spine pathology contributing to symptoms  3  Will also order cervical MRI without contrast  4  Again patient is completed greater than 6 weeks of physician guided home exercises as well as physical therapy in the last 6 months with minimal relief in his ongoing pain  5  Will call patient with imaging results and plan moving forward  He would likely benefit from interventional approaches to manage his pain, in particular, would consider lumbar epidurals as well as cervical medial branch blocks versus epidural regarding his ongoing chronic pain     My impressions and treatment recommendations were discussed in detail with the patient who verbalized understanding and had no further questions  Discharge instructions were provided  I personally saw and examined the patient and I agree with the above discussed plan of care      Orders Placed This Encounter   Procedures    MRI cervical spine without contrast     Standing Status:   Future     Standing Expiration Date:   4/6/2026     Scheduling Instructions: There is no preparation for this test  Please leave your jewelry and valuables at home, wedding rings are the exception  Magnetic nail polish must be removed prior to arrival for your test  Please bring your insurance cards, a form of photo ID and a list of your medications with you  Arrive 15 minutes prior to your appointment time in order to register  Please bring any prior CT or MRI studies of this area that were not performed at a [facility]  To schedule this appointment, please contact Central Scheduling at [phone]  Prior to your appointment, please make sure you complete the MRI Screening Form when you e-Check in for your appointment  This will be available starting 7 days before your appointment in [address]  You may receive an e-mail with an activation code if you do not have a [email or web address] account  If you do not have access to a device, we will complete your screening at your appointment  Order Specific Question:   What is the patient's sedation requirement? Answer:   No Sedation     Order Specific Question:   Release to patient through [employer]     Answer:   Immediate     Order Specific Question:   Is order priority selected as STAT? Answer:   No     Order Specific Question:   Reason for Exam (FREE TEXT)     Answer:   Cervical radiculopathy    MRI lumbar spine without contrast     Standing Status:   Future     Standing Expiration Date:   4/6/2026     Scheduling Instructions: There is no preparation for this test  Please leave your jewelry and valuables at home, wedding rings are the exception  Magnetic nail polish must be removed prior to arrival for your test  Please bring your insurance cards, a form of photo ID and a list of your medications with you   Arrive 15 minutes prior to your appointment time in order to register  Please bring any prior CT or MRI studies of this area that were not performed at a [facility] facility  To schedule this appointment, please contact Central Scheduling at [phone]  Prior to your appointment, please make sure you complete the MRI Screening Form when you e-Check in for your appointment  This will be available starting 7 days before your appointment in [address]  You may receive an e-mail with an activation code if you do not have a "[employer] account  If you do not have access to a device, we will complete your screening at your appointment  Order Specific Question:   What is the patient's sedation requirement? Answer:   No Sedation     Order Specific Question:   Release to patient through [employer]     Answer:   Immediate     Order Specific Question:   Is order priority selected as STAT? Answer:   No     Order Specific Question:   Reason for Exam (FREE TEXT)     Answer:   lumbar radic     New Medications Ordered This Visit   Medications    meloxicam (MOBIC) 15 mg tablet     Sig: Take 1 tablet (15 mg total) by mouth daily     Dispense:  30 tablet     Refill:  1       History of Present Illness    Lizzy Rojas is a 77 y o  male presents to [place] [address] [facility] for initial evaluation regarding neck and low back pain of several years duration  Patient denies any significant inciting event or recent trauma  Today reports moderate pain rated 3 to 7/10 and interfering daily activities  Pain is intermittent 30-60% of the time that is present throughout the day and night  Describes symptoms as pressure-like, throbbing, dull aching, sharp pain  Denies any significant weakness or falls  Requires a cane for ambulation  Symptoms are worse with standing, walking  Has had moderate relief with 10s unit and excellent relief with acupuncture  Previously tried meloxicam which did provide some relief    Presents today for initial evaluation  I have personally reviewed and/or updated the patient's past medical history, past surgical history, family history, social history, current medications, allergies, and vital signs today  Review of Systems   Constitutional: Negative for fever and unexpected weight change  HENT: Negative for trouble swallowing  Eyes: Negative for visual disturbance  Respiratory: Negative for shortness of breath and wheezing  Cardiovascular: Positive for chest pain  Negative for palpitations  Gastrointestinal: Negative for constipation, diarrhea, nausea and vomiting  Endocrine: Negative for cold intolerance, heat intolerance and polydipsia  Genitourinary: Negative for difficulty urinating and frequency  Musculoskeletal: Positive for gait problem and joint swelling  Negative for arthralgias and myalgias  Skin: Negative for rash  Neurological: Negative for dizziness, seizures, syncope, weakness and headaches  Hematological: Does not bruise/bleed easily  Psychiatric/Behavioral: Negative for dysphoric mood  All other systems reviewed and are negative  Patient Active Problem List   Diagnosis    Colon cancer screening    Colon polyps    Gastroesophageal reflux disease with esophagitis       Past Medical History:   Diagnosis Date    Coronary artery disease     Hyperlipemia     Hypertension     Irregular heart beat     bradycardia       Past Surgical History:   Procedure Laterality Date    ANTERIOR CRUCIATE LIGAMENT REPAIR Left 2001    CARDIAC LOOP RECORDER  03/2022    MD from [address] possible pacer placement   [address]  2007    CABG x2       No family history on file      Social History     Occupational History    Not on file   Tobacco Use    Smoking status: Never Smoker    Smokeless tobacco: Never Used   Vaping Use    Vaping Use: Never used   Substance and Sexual Activity    Alcohol use: Never    Drug use: Never    Sexual activity: Not on file       Current Outpatient Medications on File Prior to Visit   Medication Sig    amLODIPine (NORVASC) 10 mg tablet Take 10 mg by mouth every evening      aspirin (ECOTRIN LOW STRENGTH) 81 mg EC tablet TAKE ONE TABLET BY MOUTH DAILY FOR THE HEART   buPROPion (WELLBUTRIN XL) 150 mg 24 hr tablet Take 150 mg by mouth      cyclobenzaprine (FLEXERIL) 10 mg tablet Take 10 mg by mouth daily as needed      escitalopram (LEXAPRO) 20 mg tablet Take 20 mg by mouth      hydrocortisone 2 5 % cream hydrocortisone 2 5 % topical cream   Apply to forehead twice a day for 3 weeks only    lidocaine (LIDODERM) 5 % APPLY 1 PATCH ON SKIN DAILY FOR PAIN  DO NOT WEAR FOR LONGER THAN 12 HOURS IN ANY 24 HOUR PERIOD  [address] YOUR HANDS AFTER APPLYING A PATCH   losartan (COZAAR) 50 mg tablet Take 100 mg by mouth      melatonin 3 mg Take 3 mg by mouth    montelukast (SINGULAIR) 10 mg tablet Take 10 mg by mouth    [DISCONTINUED] atorvastatin (LIPITOR) 80 mg tablet Take 80 mg by mouth    [DISCONTINUED] meloxicam (MOBIC) 15 mg tablet Take 1 tablet (15 mg total) by mouth daily (Patient taking differently: Take 15 mg by mouth daily as needed  )    [DISCONTINUED] metoprolol tartrate (LOPRESSOR) 25 mg tablet Take 25 mg by mouth    [DISCONTINUED] Na Sulfate-K Sulfate-Mg Sulf (Suprep Bowel Prep Kit) 17 5-3 13-1 6 GM/177ML SOLN Take 1 bottle by mouth followed by copious water intake the 2nd bottle by mouth the day before colonoscopy (Patient not taking: Reported on 3/24/2022 )     No current facility-administered medications on file prior to visit  Allergies   Allergen Reactions    Bee Pollen Other (See Comments)     Itchy eyes/nose, sneezing, clogged ears, increased cough/chest tightness   Moxifloxacin GI Intolerance     W/ GI bleed      Pollen Extract Other (See Comments)     Itchy eyes/nose, sneezing, clogged ears, increased cough/chest tightness      Lisinopril Cough       Physical Exam    /73   Pulse 84   Wt 73 5 kg (162 lb)   BMI 25 76 kg/m²     Constitutional: normal, well developed, well nourished, alert, in no distress and non-toxic and no overt pain behavior    Eyes: anicteric  HEENT: grossly intact  Neck: supple, symmetric, trachea midline and no masses   Pulmonary:even and unlabored  Cardiovascular:No edema or pitting edema present  Skin:Normal without rashes or lesions and well hydrated  Psychiatric:Mood and affect appropriate  Neurologic:Cranial Nerves II-XII grossly intact  Musculoskeletal:antalgic   Neck exam  Tenderness to palpation bilateral cervical paraspinals, decreased active and passive range of motion with cervical flexion and extension limited by pain, MMT 5/5 bilateral upper extremities, sensation grossly intact to light touch, DTRs within normal limits, positive Spurling with radicular symptoms into the right shoulder, positive cervical compression testing with palpation bilateral cervical facets with axial loading  Back exam  Tenderness to palpation bilateral lumbar paraspinals, decreased active and passive range of motion with lumbar flexion and extension limited by pain, MMT 5/5 bilateral lower extremities, sensation grossly intact to light touch, DTRs within normal limits, positive straight leg raise in the supine position with radicular pain into the right hip    Imaging

## 2022-04-13 ENCOUNTER — ANESTHESIA EVENT (OUTPATIENT)
Dept: GASTROENTEROLOGY | Facility: AMBULARY SURGERY CENTER | Age: 67
End: 2022-04-13

## 2022-04-13 ENCOUNTER — HOSPITAL ENCOUNTER (OUTPATIENT)
Dept: GASTROENTEROLOGY | Facility: AMBULARY SURGERY CENTER | Age: 67
Setting detail: OUTPATIENT SURGERY
Discharge: HOME | End: 2022-04-13
Attending: INTERNAL MEDICINE
Payer: MEDICARE

## 2022-04-13 ENCOUNTER — ANESTHESIA (OUTPATIENT)
Dept: GASTROENTEROLOGY | Facility: AMBULARY SURGERY CENTER | Age: 67
End: 2022-04-13

## 2022-04-13 VITALS
HEART RATE: 57 BPM | RESPIRATION RATE: 18 BRPM | DIASTOLIC BLOOD PRESSURE: 79 MMHG | SYSTOLIC BLOOD PRESSURE: 144 MMHG | OXYGEN SATURATION: 97 % | TEMPERATURE: 96.8 F | HEIGHT: 66 IN | BODY MASS INDEX: 24.91 KG/M2 | WEIGHT: 155 LBS

## 2022-04-13 DIAGNOSIS — K21.00 GASTROESOPHAGEAL REFLUX DISEASE WITH ESOPHAGITIS WITHOUT HEMORRHAGE: ICD-10-CM

## 2022-04-13 DIAGNOSIS — K27.9 PEPTIC ULCER DISEASE: Primary | ICD-10-CM

## 2022-04-13 DIAGNOSIS — K63.5 POLYP OF COLON, UNSPECIFIED PART OF COLON, UNSPECIFIED TYPE: ICD-10-CM

## 2022-04-13 PROBLEM — Z95.1 HISTORY OF CORONARY ARTERY BYPASS GRAFT: Status: ACTIVE | Noted: 2022-04-13

## 2022-04-13 PROBLEM — F32.A DEPRESSION: Status: ACTIVE | Noted: 2022-04-13

## 2022-04-13 PROBLEM — E78.5 HYPERLIPIDEMIA: Status: ACTIVE | Noted: 2022-04-13

## 2022-04-13 PROBLEM — F41.9 ANXIETY: Status: ACTIVE | Noted: 2022-04-13

## 2022-04-13 PROBLEM — Z95.818 STATUS POST PLACEMENT OF IMPLANTABLE LOOP RECORDER: Status: ACTIVE | Noted: 2022-04-13

## 2022-04-13 PROBLEM — G45.9 TIA (TRANSIENT ISCHEMIC ATTACK): Status: ACTIVE | Noted: 2022-04-13

## 2022-04-13 PROBLEM — I25.10 CAD (CORONARY ARTERY DISEASE): Status: ACTIVE | Noted: 2022-04-13

## 2022-04-13 PROBLEM — I10 HTN (HYPERTENSION): Status: ACTIVE | Noted: 2022-04-13

## 2022-04-13 PROCEDURE — G0121 COLON CA SCRN NOT HI RSK IND: HCPCS | Performed by: INTERNAL MEDICINE

## 2022-04-13 PROCEDURE — 88305 TISSUE EXAM BY PATHOLOGIST: CPT | Performed by: PATHOLOGY

## 2022-04-13 PROCEDURE — 43239 EGD BIOPSY SINGLE/MULTIPLE: CPT | Performed by: INTERNAL MEDICINE

## 2022-04-13 RX ORDER — PROPOFOL 10 MG/ML
INJECTION, EMULSION INTRAVENOUS AS NEEDED
Status: DISCONTINUED | OUTPATIENT
Start: 2022-04-13 | End: 2022-04-13

## 2022-04-13 RX ORDER — OMEPRAZOLE 20 MG/1
20 CAPSULE, DELAYED RELEASE ORAL DAILY
Qty: 60 CAPSULE | Refills: 1 | Status: SHIPPED | OUTPATIENT
Start: 2022-04-13

## 2022-04-13 RX ORDER — SODIUM CHLORIDE, SODIUM LACTATE, POTASSIUM CHLORIDE, CALCIUM CHLORIDE 600; 310; 30; 20 MG/100ML; MG/100ML; MG/100ML; MG/100ML
INJECTION, SOLUTION INTRAVENOUS CONTINUOUS PRN
Status: DISCONTINUED | OUTPATIENT
Start: 2022-04-13 | End: 2022-04-13

## 2022-04-13 RX ORDER — LIDOCAINE HYDROCHLORIDE 10 MG/ML
INJECTION, SOLUTION EPIDURAL; INFILTRATION; INTRACAUDAL; PERINEURAL AS NEEDED
Status: DISCONTINUED | OUTPATIENT
Start: 2022-04-13 | End: 2022-04-13

## 2022-04-13 RX ORDER — SODIUM CHLORIDE, SODIUM LACTATE, POTASSIUM CHLORIDE, CALCIUM CHLORIDE 600; 310; 30; 20 MG/100ML; MG/100ML; MG/100ML; MG/100ML
75 INJECTION, SOLUTION INTRAVENOUS CONTINUOUS
Status: DISCONTINUED | OUTPATIENT
Start: 2022-04-13 | End: 2022-04-17 | Stop reason: HOSPADM

## 2022-04-13 RX ORDER — PROPOFOL 10 MG/ML
INJECTION, EMULSION INTRAVENOUS CONTINUOUS PRN
Status: DISCONTINUED | OUTPATIENT
Start: 2022-04-13 | End: 2022-04-13

## 2022-04-13 RX ADMIN — SODIUM CHLORIDE, SODIUM LACTATE, POTASSIUM CHLORIDE, AND CALCIUM CHLORIDE 75 ML/HR: .6; .31; .03; .02 INJECTION, SOLUTION INTRAVENOUS at 09:08

## 2022-04-13 RX ADMIN — PROPOFOL 50 MG: 10 INJECTION, EMULSION INTRAVENOUS at 09:55

## 2022-04-13 RX ADMIN — PROPOFOL 150 MCG/KG/MIN: 10 INJECTION, EMULSION INTRAVENOUS at 09:57

## 2022-04-13 RX ADMIN — PROPOFOL 50 MG: 10 INJECTION, EMULSION INTRAVENOUS at 09:57

## 2022-04-13 RX ADMIN — SODIUM CHLORIDE, SODIUM LACTATE, POTASSIUM CHLORIDE, AND CALCIUM CHLORIDE: .6; .31; .03; .02 INJECTION, SOLUTION INTRAVENOUS at 09:50

## 2022-04-13 RX ADMIN — LIDOCAINE HYDROCHLORIDE 50 MG: 10 INJECTION, SOLUTION EPIDURAL; INFILTRATION; INTRACAUDAL; PERINEURAL at 09:54

## 2022-04-13 RX ADMIN — PROPOFOL 150 MG: 10 INJECTION, EMULSION INTRAVENOUS at 09:54

## 2022-04-13 NOTE — H&P
History and Physical - [facility]  Walt Funes 77 y o  male MRN: [id number]                  HPI: Walt Funes is a 77y o  year old male who presents for EGD and colonoscopy history of reflux and colon polyps type unknown last colonoscopy 5 years ago      REVIEW OF SYSTEMS: Per the HPI, and otherwise unremarkable  Historical Information   Past Medical History:   Diagnosis Date    Coronary artery disease     Hyperlipemia     Hypertension     Irregular heart beat     bradycardia     Past Surgical History:   Procedure Laterality Date    ANTERIOR CRUCIATE LIGAMENT REPAIR Left 2001    CARDIAC LOOP RECORDER  03/2022    MD from [address] [place] [address] possible pacer placement   [address]  2007    CABG x2     Social History   Social History     Substance and Sexual Activity   Alcohol Use Never     Social History     Substance and Sexual Activity   Drug Use Never     Social History     Tobacco Use   Smoking Status Never Smoker   Smokeless Tobacco Never Used     History reviewed  No pertinent family history  Meds/Allergies       Current Outpatient Medications:     amLODIPine (NORVASC) 10 mg tablet    aspirin (ECOTRIN LOW STRENGTH) 81 mg EC tablet    buPROPion (WELLBUTRIN XL) 150 mg 24 hr tablet    losartan (COZAAR) 50 mg tablet    melatonin 3 mg    meloxicam (MOBIC) 15 mg tablet    cyclobenzaprine (FLEXERIL) 10 mg tablet    escitalopram (LEXAPRO) 20 mg tablet    hydrocortisone 2 5 % cream    lidocaine (LIDODERM) 5 %    montelukast (SINGULAIR) 10 mg tablet    Current Facility-Administered Medications:     lactated ringers infusion, 75 mL/hr, Intravenous, Continuous, 75 mL/hr at 04/13/22 0908    Allergies   Allergen Reactions    Bee Pollen Other (See Comments)     Itchy eyes/nose, sneezing, clogged ears, increased cough/chest tightness      Moxifloxacin GI Intolerance     W/ GI bleed      Pollen Extract Other (See Comments)     Itchy eyes/nose, sneezing, clogged ears, increased cough/chest tightness   Lisinopril Cough       Objective     /70   Pulse 75   Temp (!) 96 8 °F (36 °C) (Probe)   Resp 18   Ht 5' 6" (1 676 m)   Wt 70 3 kg (155 lb)   SpO2 96%   BMI 25 02 kg/m²       PHYSICAL EXAM    Gen: NAD  Head: NCAT  CV: RRR  CHEST: Clear  ABD: soft, NT/ND  EXT: no edema      ASSESSMENT/PLAN:  This is a 77y o  year old male here for EGD and colonoscopy, and he is stable and optimized for his procedure

## 2022-04-13 NOTE — ANESTHESIA POSTPROCEDURE EVALUATION
Post-Op Assessment Note    CV Status:  Stable  Pain Score: 0    Pain management: adequate     Mental Status:  Sleepy   Hydration Status:  Stable   PONV Controlled:  None   Airway Patency:  Patent   Two or more mitigation strategies used for obstructive sleep apnea   Post Op Vitals Reviewed: Yes      Staff: CRNA         No complications documented      BP   97/52   Temp 97   Pulse 65   Resp 16   SpO2 99

## 2022-04-13 NOTE — ANESTHESIA PREPROCEDURE EVALUATION
Procedure:  EGD  COLONOSCOPY    Relevant Problems   CARDIO   (+) CAD (coronary artery disease)   (+) HTN (hypertension)   (+) History of coronary artery bypass graft - CABG x2 in 2007   (+) Hyperlipidemia      GI/HEPATIC   (+) Gastroesophageal reflux disease with esophagitis      NEURO/PSYCH   (+) Anxiety   (+) Depression   (+) TIA (transient ischemic attack)      Other   (+) Status post placement of implantable loop recorder - bradycardia        Physical Exam    Airway    Mallampati score: II  TM Distance: >3 FB  Neck ROM: full     Dental       Cardiovascular  Rhythm: regular, Rate: normal,     Pulmonary  Breath sounds clear to auscultation,     Other Findings        Anesthesia Plan  ASA Score- 3     Anesthesia Type- IV sedation with anesthesia with ASA Monitors  Additional Monitors:   Airway Plan:           Plan Factors-    Chart reviewed  Patient is not a current smoker  Induction- intravenous  Postoperative Plan-     Informed Consent- Anesthetic plan and risks discussed with patient  I personally reviewed this patient with the CRNA  Discussed and agreed on the Anesthesia Plan with the CRNA  Tamika Console

## 2022-04-22 ENCOUNTER — OFFICE VISIT (OUTPATIENT)
Dept: URGENT CARE | Facility: CLINIC | Age: 67
End: 2022-04-22
Payer: MEDICARE

## 2022-04-22 VITALS
DIASTOLIC BLOOD PRESSURE: 82 MMHG | SYSTOLIC BLOOD PRESSURE: 172 MMHG | HEART RATE: 101 BPM | RESPIRATION RATE: 16 BRPM | OXYGEN SATURATION: 98 % | WEIGHT: 155 LBS | HEIGHT: 66 IN | BODY MASS INDEX: 24.91 KG/M2 | TEMPERATURE: 99.2 F

## 2022-04-22 DIAGNOSIS — R05.1 ACUTE COUGH: Primary | ICD-10-CM

## 2022-04-22 PROCEDURE — 87636 SARSCOV2 & INF A&B AMP PRB: CPT | Performed by: PHYSICIAN ASSISTANT

## 2022-04-22 PROCEDURE — 99213 OFFICE O/P EST LOW 20 MIN: CPT | Performed by: PHYSICIAN ASSISTANT

## 2022-04-22 NOTE — PATIENT INSTRUCTIONS
Upper Respiratory Infection   WHAT YOU NEED TO KNOW:   An upper respiratory infection is also called a cold  It can affect your nose, throat, ears, and sinuses  Cold symptoms are usually worst for the first 3 to 5 days  Most people get better in 7 to 14 days  You may continue to cough for 2 to 3 weeks  Colds are caused by viruses and do not get better with antibiotics  DISCHARGE INSTRUCTIONS:   Call your local emergency number (911 in the [facility]) if:   · You have chest pain or trouble breathing  Return to the emergency department if:   · You have a fever over 102ºF (39ºC)  Call your doctor if:   · You have a low fever  · Your sore throat gets worse or you see white or yellow spots in your throat  · Your symptoms get worse after 3 to 5 days or are not better in 14 days  · You have a rash anywhere on your skin  · You have large, tender lumps in your neck  · You have thick, green, or yellow drainage from your nose  · You cough up thick yellow, green, or bloody mucus  · You have a bad earache  · You have questions or concerns about your condition or care  Medicines: You may need any of the following:  · Decongestants  help reduce nasal congestion and help you breathe more easily  If you take decongestant pills, they may make you feel restless or cause problems with your sleep  Do not use decongestant sprays for more than a few days  · Cough suppressants  help reduce coughing  Ask your healthcare provider which type of cough medicine is best for you  · NSAIDs , such as ibuprofen, help decrease swelling, pain, and fever  NSAIDs can cause stomach bleeding or kidney problems in certain people  If you take blood thinner medicine, always ask your healthcare provider if NSAIDs are safe for you  Always read the medicine label and follow directions  · Acetaminophen  decreases pain and fever  It is available without a doctor's order  Ask how much to take and how often to take it  Follow directions  Read the labels of all other medicines you are using to see if they also contain acetaminophen, or ask your doctor or pharmacist  Acetaminophen can cause liver damage if not taken correctly  Do not use more than 4 grams (4,000 milligrams) total of acetaminophen in one day  · Take your medicine as directed  Contact your healthcare provider if you think your medicine is not helping or if you have side effects  Tell him or her if you are allergic to any medicine  Keep a list of the medicines, vitamins, and herbs you take  Include the amounts, and when and why you take them  Bring the list or the pill bottles to follow-up visits  Carry your medicine list with you in case of an emergency  Self-care:   · Rest as much as possible  Slowly start to do more each day  · Drink more liquids as directed  Liquids will help thin and loosen mucus so you can cough it up  Liquids will also help prevent dehydration  Liquids that help prevent dehydration include water, fruit juice, and broth  Do not drink liquids that contain caffeine  Caffeine can increase your risk for dehydration  Ask your healthcare provider how much liquid to drink each day  · Soothe a sore throat  Gargle with warm salt water  Make salt water by dissolving ¼ teaspoon salt in 1 cup warm water  You may also suck on hard candy or throat lozenges  You may use a sore throat spray  · Use a humidifier or vaporizer  Use a cool mist humidifier or a vaporizer to increase air moisture in your home  This may make it easier for you to breathe and help decrease your cough  · Use saline nasal drops as directed  These help relieve congestion  · Apply petroleum-based jelly around the outside of your nostrils  This can decrease irritation from blowing your nose  · Do not smoke  Nicotine and other chemicals in cigarettes and cigars can make your symptoms worse  They can also cause infections such as bronchitis or pneumonia   Ask your healthcare provider for information if you currently smoke and need help to quit  E-cigarettes or smokeless tobacco still contain nicotine  Talk to your healthcare provider before you use these products  Prevent a cold:   · Wash your hands often  Use soap and water every time you wash your hands  Rub your soapy hands together, lacing your fingers  Use the fingers of one hand to scrub under the nails of the other hand  Wash for at least 20 seconds  Rinse with warm, running water for several seconds  Then dry your hands  Use germ-killing gel if soap and water are not available  Do not touch your eyes or mouth without washing your hands first          · Cover a sneeze or cough  Use a tissue that covers your mouth and nose  Put the used tissue in the trash right away  Use the bend of your arm if a tissue is not available  Wash your hands well with soap and water or use a hand   Do not stand close to anyone who is sneezing or coughing  · Try to stay away from others while you are sick  This is especially important during the first 2 to 3 days when the virus is more easily spread  Wait until a fever, cough, or other symptoms are gone before you return to work or other regular activities  · Do not share items while you are sick  This includes food, drinks, eating utensils, and dishes  Follow up with your doctor as directed:  Write down your questions so you remember to ask them during your visits  © Copyright [employer] 2022 Information is for End User's use only and may not be sold, redistributed or otherwise used for commercial purposes  All illustrations and images included in CareNotes® are the copyrighted property of [employer]  or Jeanne Mcclellan  The above information is an  only  It is not intended as medical advice for individual conditions or treatments   Talk to your doctor, nurse or pharmacist before following any medical regimen to see if it is safe and effective for you  Acute Upper Respiratory Tract Infection:   -There is no sign of bacterial infection on exam at this time  This is likely a viral illness  Advised supportive measures  Will rule out COVID and influenza today    -Avoid medications with decongestants like sudafed as they can raise your BP  -Fluticasone Nasal Spray for PND and congestion  -You can use OTC zyrtec or claritin  You can OTC mucinex  -Use a humidifier next to your bed  Take steam showers     -You can take Tylenol for fever or pain  -Stay very well hydrated and rest   -If your symptoms persist or worsen follow up immediately with your PCP

## 2022-04-22 NOTE — PROGRESS NOTES
[facility]        NAME: Ana Mims is a 77 y o  male  : 1955    MRN: [id number]  DATE: 2022  TIME: 3:18 PM    Assessment and Plan   Acute cough [R05 1]  1  Acute cough  Cov/Flu-Collected at   [address] or [facility]         Patient Instructions   Acute Upper Respiratory Tract Infection:   -There is no sign of bacterial infection on exam at this time  This is likely a viral illness  Advised supportive measures  Will rule out COVID and influenza today    -Avoid medications with decongestants like sudafed as they can raise your BP  -Fluticasone Nasal Spray for PND and congestion  -You can use OTC zyrtec or claritin  You can OTC mucinex  -Use a humidifier next to your bed  Take steam showers  -You can take Tylenol for fever or pain  -Stay very well hydrated and rest   -If your symptoms persist or worsen follow up immediately with your PCP      Follow up with PCP in 3-5 days  Proceed to  ER if symptoms worsen  Chief Complaint     Chief Complaint   Patient presents with    Cold Like Symptoms     Pt reports cough body aches and congestion, 2x days         History of Present Illness       The patient states that two days ago his sx began with dry cough, body aches, chills, sneezing, nasal congestion, anorexia and scratchy throat  He denies fever  No nausea, vomiting or diarrhea  No dyspnea, palpitations, wheezing, chest tightness  He has no hx of COPD, asthma or smoking  His wife is ill with similar symptoms  He has been taking Tylenol Flu and Dayquil/nyquil  He also took a dose of sudafed this morning  He is fully vaccinated for COVID  He would like influenza and COVID testing today  Review of Systems   Review of Systems   Constitutional: Positive for chills and fatigue  Negative for activity change, appetite change, diaphoresis and fever  HENT: Positive for congestion, rhinorrhea, sneezing and sore throat   Negative for ear discharge, ear pain, facial swelling, hearing loss, postnasal drip, sinus pressure, sinus pain, tinnitus, trouble swallowing and voice change  Eyes: Negative for visual disturbance  Respiratory: Positive for cough  Negative for apnea, chest tightness, shortness of breath, wheezing and stridor  Cardiovascular: Negative for chest pain, palpitations and leg swelling  Gastrointestinal: Negative for abdominal distention and abdominal pain  Genitourinary: Negative for decreased urine volume  Musculoskeletal: Positive for myalgias  Negative for arthralgias, joint swelling, neck pain and neck stiffness  Skin: Negative for rash  Allergic/Immunologic: Negative for immunocompromised state  Neurological: Negative for dizziness, weakness, light-headedness, numbness and headaches  Hematological: Negative for adenopathy  Current Medications       Current Outpatient Medications:     amLODIPine (NORVASC) 10 mg tablet, Take 10 mg by mouth every evening  , Disp: , Rfl:     aspirin (ECOTRIN LOW STRENGTH) 81 mg EC tablet, TAKE ONE TABLET BY MOUTH DAILY FOR THE HEART , Disp: , Rfl:     buPROPion (WELLBUTRIN XL) 150 mg 24 hr tablet, Take 150 mg by mouth  , Disp: , Rfl:     cyclobenzaprine (FLEXERIL) 10 mg tablet, Take 10 mg by mouth daily as needed  , Disp: , Rfl:     escitalopram (LEXAPRO) 20 mg tablet, Take 20 mg by mouth  , Disp: , Rfl:     hydrocortisone 2 5 % cream, hydrocortisone 2 5 % topical cream  Apply to forehead twice a day for 3 weeks only, Disp: , Rfl:     lidocaine (LIDODERM) 5 %, APPLY 1 PATCH ON SKIN DAILY FOR PAIN  DO NOT WEAR FOR LONGER THAN 12 HOURS IN ANY 24 HOUR PERIOD   [address] YOUR HANDS AFTER APPLYING A PATCH , Disp: , Rfl:     losartan (COZAAR) 50 mg tablet, Take 100 mg by mouth  , Disp: , Rfl:     melatonin 3 mg, Take 3 mg by mouth, Disp: , Rfl:     meloxicam (MOBIC) 15 mg tablet, Take 1 tablet (15 mg total) by mouth daily, Disp: 30 tablet, Rfl: 1    montelukast (SINGULAIR) 10 mg tablet, Take 10 mg by mouth, Disp: , Rfl:   omeprazole (PriLOSEC) 20 mg delayed release capsule, Take 1 capsule (20 mg total) by mouth daily, Disp: 60 capsule, Rfl: 1    Current Allergies     Allergies as of 04/22/2022 - Reviewed 04/22/2022   Allergen Reaction Noted    Bee pollen Other (See Comments) 05/06/2019    Moxifloxacin GI Intolerance 10/21/2009    Pollen extract Other (See Comments) 05/06/2019    Lisinopril Cough 04/06/2009            The following portions of the patient's history were reviewed and updated as appropriate: allergies, current medications, past family history, past medical history, past social history, past surgical history and problem list      Past Medical History:   Diagnosis Date    Coronary artery disease     Hyperlipemia     Hypertension     Irregular heart beat     bradycardia       Past Surgical History:   Procedure Laterality Date    ANTERIOR CRUCIATE LIGAMENT REPAIR Left 2001    CARDIAC LOOP RECORDER  03/2022    MD from [facility] possible pacer placement   [address]  2007    CABG x2       History reviewed  No pertinent family history  Medications have been verified  Objective   BP (!) 172/82   Pulse 101   Temp 99 2 °F (37 3 °C)   Resp 16   Ht 5' 6" (1 676 m)   Wt 70 3 kg (155 lb)   SpO2 98%   BMI 25 02 kg/m²   No LMP for male patient  Physical Exam     Physical Exam  Vitals and nursing note reviewed  Constitutional:       General: He is not in acute distress  Appearance: He is well-developed  He is not diaphoretic  HENT:      Head: Normocephalic and atraumatic  Right Ear: Hearing, tympanic membrane, ear canal and external ear normal       Left Ear: Hearing, tympanic membrane, ear canal and external ear normal       Nose: Congestion present  No mucosal edema or rhinorrhea  Right Sinus: No maxillary sinus tenderness or frontal sinus tenderness  Left Sinus: No maxillary sinus tenderness or frontal sinus tenderness        Mouth/Throat:      Pharynx: Oropharynx is clear  Uvula midline  No pharyngeal swelling, oropharyngeal exudate, posterior oropharyngeal erythema or uvula swelling  Tonsils: No tonsillar exudate or tonsillar abscesses  1+ on the right  1+ on the left  Cardiovascular:      Rate and Rhythm: Regular rhythm  Tachycardia present  No extrasystoles are present  Heart sounds: Normal heart sounds, S1 normal and S2 normal  Heart sounds not distant  No murmur heard  No friction rub  No gallop  No S3 or S4 sounds  Pulmonary:      Effort: No tachypnea, bradypnea, accessory muscle usage or respiratory distress  Breath sounds: Normal breath sounds and air entry  No stridor, decreased air movement or transmitted upper airway sounds  No decreased breath sounds, wheezing, rhonchi or rales  Musculoskeletal:      Cervical back: Normal range of motion and neck supple  Lymphadenopathy:      Cervical: No cervical adenopathy  Neurological:      Mental Status: He is alert and oriented to person, place, and time     Psychiatric:         Behavior: Behavior normal

## 2022-04-23 LAB
FLUAV RNA RESP QL NAA+PROBE: NEGATIVE
FLUBV RNA RESP QL NAA+PROBE: NEGATIVE
SARS-COV-2 RNA RESP QL NAA+PROBE: POSITIVE

## 2022-04-27 ENCOUNTER — TELEPHONE (OUTPATIENT)
Dept: GASTROENTEROLOGY | Facility: CLINIC | Age: 67
End: 2022-04-27

## 2022-04-27 NOTE — TELEPHONE ENCOUNTER
Received message from wife asking for a call back to possibly r/s her and pt's appt  I returned call, lmom apologizing for missing her and asked her to please call back

## 2022-05-05 ENCOUNTER — OFFICE VISIT (OUTPATIENT)
Dept: FAMILY MEDICINE CLINIC | Facility: CLINIC | Age: 67
End: 2022-05-05
Payer: MEDICARE

## 2022-05-05 VITALS
WEIGHT: 162 LBS | HEIGHT: 66 IN | RESPIRATION RATE: 16 BRPM | BODY MASS INDEX: 26.03 KG/M2 | HEART RATE: 101 BPM | TEMPERATURE: 98.2 F | DIASTOLIC BLOOD PRESSURE: 70 MMHG | OXYGEN SATURATION: 97 % | SYSTOLIC BLOOD PRESSURE: 130 MMHG

## 2022-05-05 DIAGNOSIS — F33.1 MAJOR DEPRESSIVE DISORDER, RECURRENT, MODERATE (HCC): ICD-10-CM

## 2022-05-05 DIAGNOSIS — I10 HYPERTENSION, UNSPECIFIED TYPE: ICD-10-CM

## 2022-05-05 DIAGNOSIS — I25.118 CORONARY ARTERY DISEASE OF NATIVE ARTERY OF NATIVE HEART WITH STABLE ANGINA PECTORIS (HCC): ICD-10-CM

## 2022-05-05 DIAGNOSIS — Z95.1 HISTORY OF CORONARY ARTERY BYPASS GRAFT: ICD-10-CM

## 2022-05-05 DIAGNOSIS — U07.1 COVID-19 VIRUS INFECTION: Primary | ICD-10-CM

## 2022-05-05 PROBLEM — R05.1 ACUTE COUGH: Status: RESOLVED | Noted: 2022-04-22 | Resolved: 2022-05-05

## 2022-05-05 PROBLEM — F32.A DEPRESSION: Status: RESOLVED | Noted: 2022-04-13 | Resolved: 2022-05-05

## 2022-05-05 PROBLEM — E78.2 MIXED HYPERLIPIDEMIA: Status: ACTIVE | Noted: 2022-04-13

## 2022-05-05 PROCEDURE — 99204 OFFICE O/P NEW MOD 45 MIN: CPT | Performed by: FAMILY MEDICINE

## 2022-05-05 PROCEDURE — 1123F ACP DISCUSS/DSCN MKR DOCD: CPT | Performed by: FAMILY MEDICINE

## 2022-05-05 RX ORDER — MIRTAZAPINE 15 MG/1
15 TABLET, FILM COATED ORAL
COMMUNITY

## 2022-05-05 RX ORDER — DEXAMETHASONE 4 MG/1
2 TABLET ORAL 2 TIMES DAILY
Qty: 12 G | Refills: 0 | Status: SHIPPED | OUTPATIENT
Start: 2022-05-05 | End: 2022-06-16

## 2022-05-05 RX ORDER — ATORVASTATIN CALCIUM 80 MG/1
80 TABLET, FILM COATED ORAL DAILY
COMMUNITY
Start: 2022-02-28

## 2022-05-05 RX ORDER — PRAZOSIN HYDROCHLORIDE 5 MG/1
5 CAPSULE ORAL
COMMUNITY

## 2022-05-05 RX ORDER — LIFITEGRAST 50 MG/ML
SOLUTION/ DROPS OPHTHALMIC
COMMUNITY
Start: 2022-04-19

## 2022-05-05 RX ORDER — HYDROCHLOROTHIAZIDE 25 MG/1
25 TABLET ORAL DAILY
COMMUNITY

## 2022-05-05 NOTE — PROGRESS NOTES
Assessment/Plan:    1  COVID-19 virus infection  -     fluticasone (Flovent HFA) 110 MCG/ACT inhaler; Inhale 2 puffs 2 (two) times a day Rinse mouth after use  2  Major depressive disorder, recurrent, moderate (HCC)  Assessment & Plan:  Pt sees a psycholigist is controlled      3  BMI 26 0-26 9,adult    4  Hypertension, unspecified type  Assessment & Plan:  Stable today      5  Coronary artery disease of native artery of native heart with stable angina pectoris [facility])  Assessment & Plan:  Pt sees cardio no CP at this time      6  History of coronary artery bypass graft - CABG x2 in 2007    With ambulation RA 02 sat dropped from 97 to 93%  Pt denies CP, is a little SOB        Patient Instructions     Weight Management   AMBULATORY CARE:   Why it is important to manage your weight:  Being overweight increases your risk of health conditions such as heart disease, high blood pressure, type 2 diabetes, and certain types of cancer  It can also increase your risk for osteoarthritis, sleep apnea, and other respiratory problems  Aim for a slow, steady weight loss  Even a small amount of weight loss can lower your risk of health problems  Risks of being overweight:  Extra weight can cause many health problems, including the following:  Diabetes (high blood sugar level)    High blood pressure or high cholesterol    Heart disease    Stroke    Gallbladder or liver disease    Cancer of the colon, breast, prostate, liver, or kidney    Sleep apnea    Arthritis or gout    Screening  is done to check for health conditions before you have signs or symptoms  If you are 28to 79years old, your blood sugar level may be checked every 3 years for signs of prediabetes or diabetes  Your healthcare provider will check your blood pressure at each visit  High blood pressure can lead to a stroke or other problems  Your provider may check for signs of heart disease, cancer, or other health problems    How to lose weight safely:  A safe and healthy way to lose weight is to eat fewer calories and get regular exercise  You can lose up about 1 pound a week by decreasing the number of calories you eat by 500 calories each day  You can decrease calories by eating smaller portion sizes or by cutting out high-calorie foods  Read labels to find out how many calories are in the foods you eat  You can also burn calories with exercise such as walking, swimming, or biking  You will be more likely to keep weight off if you make these changes part of your lifestyle  Exercise at least 30 minutes per day on most days of the week  You can also fit in more physical activity by taking the stairs instead of the elevator or parking farther away from stores  Ask your healthcare provider about the best exercise plan for you  Healthy meal plan for weight management:  A healthy meal plan includes a variety of foods, contains fewer calories, and helps you stay healthy  A healthy meal plan includes the following:     Eat whole-grain foods more often  A healthy meal plan should contain fiber  Fiber is the part of grains, fruits, and vegetables that is not broken down by your body  Whole-grain foods are healthy and provide extra fiber in your diet  Some examples of whole-grain foods are whole-wheat breads and pastas, oatmeal, brown rice, and bulgur  Eat a variety of vegetables every day  Include dark, leafy greens such as spinach, kale, jabari greens, and mustard greens  Eat yellow and orange vegetables such as carrots, sweet potatoes, and winter squash  Eat a variety of fruits every day  Choose fresh or canned fruit (canned in its own juice or light syrup) instead of juice  Fruit juice has very little or no fiber  Eat low-fat dairy foods  Drink fat-free (skim) milk or 1% milk  Eat fat-free yogurt and low-fat cottage cheese  Try low-fat cheeses such as mozzarella and other reduced-fat cheeses      Choose meat and other protein foods that are low in fat   Choose beans or other legumes such as split peas or lentils  Choose fish, skinless poultry (chicken or turkey), or lean cuts of red meat (beef or pork)  Before you cook meat or poultry, cut off any visible fat  Use less fat and oil  Try baking foods instead of frying them  Add less fat, such as margarine, sour cream, regular salad dressing and mayonnaise to foods  Eat fewer high-fat foods  Some examples of high-fat foods include french fries, doughnuts, ice cream, and cakes  Eat fewer sweets  Limit foods and drinks that are high in sugar  This includes candy, cookies, regular soda, and sweetened drinks  Ways to decrease calories:   Eat smaller portions  Use a small plate with smaller servings  Do not eat second helpings  When you eat at a restaurant, ask for a box and place half of your meal in the box before you eat  Share an entrée with someone else  Replace high-calorie snacks with healthy, low-calorie snacks  Choose fresh fruit, vegetables, fat-free rice cakes, or air-popped popcorn instead of potato chips, nuts, or chocolate  Choose water or calorie-free drinks instead of soda or sweetened drinks  Do not shop for groceries when you are hungry  You may be more likely to make unhealthy food choices  Take a grocery list of healthy foods and shop after you have eaten  Eat regular meals  Do not skip meals  Skipping meals can lead to overeating later in the day  This can make it harder for you to lose weight  Eat a healthy snack in place of a meal if you do not have time to eat a regular meal  Talk with a dietitian to help you create a meal plan and schedule that is right for you  Other things to consider as you try to lose weight:   Be aware of situations that may give you the urge to overeat, such as eating while watching television  Find ways to avoid these situations  For example, read a book, go for a walk, or do crafts      Meet with a weight loss support group or friends who are also trying to lose weight  This may help you stay motivated to continue working on your weight loss goals  © Copyright [employer] 2022 Information is for End User's use only and may not be sold, redistributed or otherwise used for commercial purposes  All illustrations and images included in CareNotes® are the copyrighted property of [employer]  or Jeanne Mcclellan  The above information is an  only  It is not intended as medical advice for individual conditions or treatments  Talk to your doctor, nurse or pharmacist before following any medical regimen to see if it is safe and effective for you  Return in about 2 weeks (around 5/19/2022) for AWV  Subjective:      Patient ID: Magdalena Rodríguez is a 77 y o  male  Chief Complaint   Patient presents with    Cold Like Symptoms     NPT-wmcma       Pt is here for the first time  Pt states he tested positive for covid 14 days ago on a home test   Pt tested himself because he was lightheaded, fluctuations in blood pressure, dizzy, bodychs and cough  No fever, no change in taste and smell  Pt has recovered from   Pt states he quarantined for 14 days  Pt was seen at the urgent care for covid    Pt states 3-4 days ago states he had an episode where he almost passed out  States he felt like when he gets propofol  Did not actually pass out  Wife called the squad but he was not taken to the ED  Pt has a cardiologist - they were informed of the episode, pt does not know what they said, they did not see him yet  Since that bout he has not had any further bouts    Pt states he has been feeling very tired  States if he walks 4 blocks and come back he will feel tired and weak, this is since he had covid  States his voice is still harsh    Basically all above symptoms have been after he was dx with covid  Not SOB at rest but he is with activity    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    [facility]  Pt was also diagnosed with post traumatic stress disorder  Pt has a psychologist and psycholiguist he sees through the [place]              The following portions of the patient's history were reviewed and updated as appropriate: allergies, current medications, past family history, past medical history, past social history, past surgical history and problem list     Review of Systems      Current Outpatient Medications   Medication Sig Dispense Refill    amLODIPine (NORVASC) 10 mg tablet Take 10 mg by mouth every evening        aspirin (ECOTRIN LOW STRENGTH) 81 mg EC tablet TAKE ONE TABLET BY MOUTH DAILY FOR THE HEART   atorvastatin (LIPITOR) 80 mg tablet Take 80 mg by mouth in the morning      buPROPion (WELLBUTRIN XL) 150 mg 24 hr tablet Take 150 mg by mouth        cyclobenzaprine (FLEXERIL) 10 mg tablet Take 10 mg by mouth daily as needed        escitalopram (LEXAPRO) 20 mg tablet Take 20 mg by mouth        hydrochlorothiazide (HYDRODIURIL) 25 mg tablet Take 25 mg by mouth daily      lidocaine (LIDODERM) 5 % APPLY 1 PATCH ON SKIN DAILY FOR PAIN  DO NOT WEAR FOR LONGER THAN 12 HOURS IN ANY 24 HOUR PERIOD  [address] YOUR HANDS AFTER APPLYING A PATCH   losartan (COZAAR) 50 mg tablet Take 100 mg by mouth        melatonin 3 mg Take 3 mg by mouth      mirtazapine (REMERON) 15 mg tablet Take 15 mg by mouth daily at bedtime      montelukast (SINGULAIR) 10 mg tablet Take 10 mg by mouth      omeprazole (PriLOSEC) 20 mg delayed release capsule Take 1 capsule (20 mg total) by mouth daily 60 capsule 1    prazosin (MINIPRESS) 5 mg capsule Take 5 mg by mouth daily at bedtime      fluticasone (Flovent HFA) 110 MCG/ACT inhaler Inhale 2 puffs 2 (two) times a day Rinse mouth after use  12 g 0    Xiidra 5 % op solution        No current facility-administered medications for this visit         Objective:    /70   Pulse 101   Temp 98 2 °F (36 8 °C)   Resp 16   Ht 5' 6" (1 676 m)   Wt 73 5 kg (162 lb) SpO2 97%   BMI 26 15 kg/m²        Physical Exam           Noe Leung DO  BMI Counseling: Body mass index is 26 15 kg/m²  The BMI is above normal  Exercise recommendations include moderate aerobic physical activity for 150 minutes/week  Depression Screening Follow-up Plan: Patient's depression screening was positive with a PHQ-2 score of   Their PHQ-9 score was 10  Patient assessed for underlying major depression  They have no active suicidal ideations  Brief counseling provided and recommend additional follow-up/re-evaluation next office visit

## 2022-05-05 NOTE — PATIENT INSTRUCTIONS
Weight Management   AMBULATORY CARE:   Why it is important to manage your weight:  Being overweight increases your risk of health conditions such as heart disease, high blood pressure, type 2 diabetes, and certain types of cancer  It can also increase your risk for osteoarthritis, sleep apnea, and other respiratory problems  Aim for a slow, steady weight loss  Even a small amount of weight loss can lower your risk of health problems  Risks of being overweight:  Extra weight can cause many health problems, including the following:  · Diabetes (high blood sugar level)    · High blood pressure or high cholesterol    · Heart disease    · Stroke    · Gallbladder or liver disease    · Cancer of the colon, breast, prostate, liver, or kidney    · Sleep apnea    · Arthritis or gout    Screening  is done to check for health conditions before you have signs or symptoms  If you are 28to 79years old, your blood sugar level may be checked every 3 years for signs of prediabetes or diabetes  Your healthcare provider will check your blood pressure at each visit  High blood pressure can lead to a stroke or other problems  Your provider may check for signs of heart disease, cancer, or other health problems  How to lose weight safely:  A safe and healthy way to lose weight is to eat fewer calories and get regular exercise  · You can lose up about 1 pound a week by decreasing the number of calories you eat by 500 calories each day  You can decrease calories by eating smaller portion sizes or by cutting out high-calorie foods  Read labels to find out how many calories are in the foods you eat  · You can also burn calories with exercise such as walking, swimming, or biking  You will be more likely to keep weight off if you make these changes part of your lifestyle  Exercise at least 30 minutes per day on most days of the week   You can also fit in more physical activity by taking the stairs instead of the elevator or parking farther away from stores  Ask your healthcare provider about the best exercise plan for you  Healthy meal plan for weight management:  A healthy meal plan includes a variety of foods, contains fewer calories, and helps you stay healthy  A healthy meal plan includes the following:     · Eat whole-grain foods more often  A healthy meal plan should contain fiber  Fiber is the part of grains, fruits, and vegetables that is not broken down by your body  Whole-grain foods are healthy and provide extra fiber in your diet  Some examples of whole-grain foods are whole-wheat breads and pastas, oatmeal, brown rice, and bulgur  · Eat a variety of vegetables every day  Include dark, leafy greens such as spinach, kale, jabari greens, and mustard greens  Eat yellow and orange vegetables such as carrots, sweet potatoes, and winter squash  · Eat a variety of fruits every day  Choose fresh or canned fruit (canned in its own juice or light syrup) instead of juice  Fruit juice has very little or no fiber  · Eat low-fat dairy foods  Drink fat-free (skim) milk or 1% milk  Eat fat-free yogurt and low-fat cottage cheese  Try low-fat cheeses such as mozzarella and other reduced-fat cheeses  · Choose meat and other protein foods that are low in fat  Choose beans or other legumes such as split peas or lentils  Choose fish, skinless poultry (chicken or turkey), or lean cuts of red meat (beef or pork)  Before you cook meat or poultry, cut off any visible fat  · Use less fat and oil  Try baking foods instead of frying them  Add less fat, such as margarine, sour cream, regular salad dressing and mayonnaise to foods  Eat fewer high-fat foods  Some examples of high-fat foods include french fries, doughnuts, ice cream, and cakes  · Eat fewer sweets  Limit foods and drinks that are high in sugar  This includes candy, cookies, regular soda, and sweetened drinks  Ways to decrease calories:   · Eat smaller portions  ? Use a small plate with smaller servings  ? Do not eat second helpings  ? When you eat at a restaurant, ask for a box and place half of your meal in the box before you eat  ? Share an entrée with someone else  · Replace high-calorie snacks with healthy, low-calorie snacks  ? Choose fresh fruit, vegetables, fat-free rice cakes, or air-popped popcorn instead of potato chips, nuts, or chocolate  ? Choose water or calorie-free drinks instead of soda or sweetened drinks  · Do not shop for groceries when you are hungry  You may be more likely to make unhealthy food choices  Take a grocery list of healthy foods and shop after you have eaten  · Eat regular meals  Do not skip meals  Skipping meals can lead to overeating later in the day  This can make it harder for you to lose weight  Eat a healthy snack in place of a meal if you do not have time to eat a regular meal  Talk with a dietitian to help you create a meal plan and schedule that is right for you  Other things to consider as you try to lose weight:   · Be aware of situations that may give you the urge to overeat, such as eating while watching television  Find ways to avoid these situations  For example, read a book, go for a walk, or do crafts  · Meet with a weight loss support group or friends who are also trying to lose weight  This may help you stay motivated to continue working on your weight loss goals  © Copyright [employer] 2022 Information is for End User's use only and may not be sold, redistributed or otherwise used for commercial purposes  All illustrations and images included in CareNotes® are the copyrighted property of [employer]  or [facility] Alpesh Ayon   The above information is an  only  It is not intended as medical advice for individual conditions or treatments  Talk to your doctor, nurse or pharmacist before following any medical regimen to see if it is safe and effective for you

## 2022-06-16 ENCOUNTER — OFFICE VISIT (OUTPATIENT)
Dept: CARDIOLOGY CLINIC | Facility: CLINIC | Age: 67
End: 2022-06-16
Payer: MEDICARE

## 2022-06-16 VITALS
SYSTOLIC BLOOD PRESSURE: 120 MMHG | WEIGHT: 167 LBS | HEIGHT: 66 IN | BODY MASS INDEX: 26.84 KG/M2 | TEMPERATURE: 98.4 F | DIASTOLIC BLOOD PRESSURE: 72 MMHG | OXYGEN SATURATION: 97 % | HEART RATE: 80 BPM

## 2022-06-16 DIAGNOSIS — Z95.818 STATUS POST PLACEMENT OF IMPLANTABLE LOOP RECORDER: ICD-10-CM

## 2022-06-16 DIAGNOSIS — D86.85 CARDIAC SARCOIDOSIS: ICD-10-CM

## 2022-06-16 DIAGNOSIS — R00.2 PALPITATIONS: ICD-10-CM

## 2022-06-16 DIAGNOSIS — I10 HYPERTENSION, UNSPECIFIED TYPE: ICD-10-CM

## 2022-06-16 DIAGNOSIS — I25.10 CORONARY ARTERY DISEASE INVOLVING NATIVE HEART, UNSPECIFIED VESSEL OR LESION TYPE, UNSPECIFIED WHETHER ANGINA PRESENT: Primary | ICD-10-CM

## 2022-06-16 DIAGNOSIS — G45.9 TIA (TRANSIENT ISCHEMIC ATTACK): ICD-10-CM

## 2022-06-16 DIAGNOSIS — E78.2 MIXED HYPERLIPIDEMIA: ICD-10-CM

## 2022-06-16 DIAGNOSIS — Z95.1 HISTORY OF CORONARY ARTERY BYPASS GRAFT: ICD-10-CM

## 2022-06-16 PROCEDURE — 93000 ELECTROCARDIOGRAM COMPLETE: CPT | Performed by: INTERNAL MEDICINE

## 2022-06-16 PROCEDURE — 99205 OFFICE O/P NEW HI 60 MIN: CPT | Performed by: INTERNAL MEDICINE

## 2022-06-16 NOTE — PROGRESS NOTES
[facility]  [facility] [address]  [address]   [place], [address]  Cardiology Consultation    Walt Funes  [id number]  1955      Consult for:CABG/Sarcoidosis  Appreciate consult by: Nirmal Mccartney DO    1  Coronary artery disease involving native heart, unspecified vessel or lesion type, unspecified whether angina present  POCT ECG   2  Hypertension, unspecified type  POCT ECG   3  TIA (transient ischemic attack)  POCT ECG   4  Mixed hyperlipidemia  POCT ECG   5  Status post placement of implantable loop recorder - bradycardia  POCT ECG      Discussion/Summary:   Palpitations/previous bradycardia/suspected Cardiac Sarcoidosis- reviewed patient's MRI and PET scan report  Echo 2D with normal ejection fraction and mild concentric hypertrophy  Current high suspicion for cardiac fibrosis and inflammation suggestive of an infiltrative process such as sarcoidosis  Current plan for defibrillator placement at outside [address] [place] [address] for risk of sudden cardiac death  Reviewed with patient and family  He will also be following up with advanced heart failure team   Consideration for immunosuppressive therapy? Patient currently denies having any exertional shortness of breath  He also has extra cardiac workup pending- last CT chest high-resolution with nonspecific pulmonary nodule and hypodense right hepatic lobe lesion? Coronary artery disease status post prior bypass grafting- his LDL is at goal   Last echo 2D with normal ejection fraction wall motion  Continue atorvastatin and aspirin  Hypertension- controlled on hydrochlorothiazide 25 mg and amlodipine 10 mg every evening    Discussed with patient by wearing compression when sitting standing    PTSD- currently on SSRI        HPI:   22-year-old disabled  history of coronary artery bypass grafting 2 vessel in 2007, hypertension with recent workup at an outside not work for episodes of tachy-[place] with advanced imaging suggestive of a inflammatory and infiltrative process  He has plans for a defibrillator placement  He still has periodic tachycardia episodes  He denies having syncope  He states not being as active currently  Denies having any severe chest discomfort  There is no history of atrial fibrillation  He is compliant with his medications  He denies having any fevers or chills  Denies any recent viral symptoms  Denies having any nausea vomiting diarrhea  PMH  CABG- 2 vessel [facility]- severe stabbing pain    Social Hx  - 2005 [place]  Disabled [address] were in [facility]  Not much physical activity  Former smoking- 10 years-- 1-2 cig light- 2 years a pack    Family Hx  Sister- 79- couple of heart attack   No hx of pacemaker    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[employer]    Past Medical History:   Diagnosis Date    Coronary artery disease     Hyperlipemia     Hypertension     Irregular heart beat     bradycardia     Social History     Socioeconomic History    Marital status: /Civil Union     Spouse name: Not on file    Number of children: Not on file    Years of education: Not on file    Highest education level: Not on file   Occupational History    Not on file   Tobacco Use    Smoking status: Never Smoker    Smokeless tobacco: Never Used   Vaping Use    Vaping Use: Never used   Substance and Sexual Activity    Alcohol use: Never    Drug use: Never    Sexual activity: Not on file   Other Topics Concern    Not on file   Social History Narrative    Not on file     Social Determinants of Health     Financial Resource Strain: Not on file   Food Insecurity: Not on file   Transportation Needs: Not on file   Physical Activity: Not on file   Stress: Not on file   Social Connections: Not on file   Intimate Partner Violence: Not on file   Housing Stability: Not on file      Family History   Problem Relation Age of Onset    Arthritis Mother     Leukemia Maternal Aunt     Mental illness Neg Hx      Past Surgical History:   Procedure Laterality Date    ANTERIOR CRUCIATE LIGAMENT REPAIR Left 2001    CARDIAC LOOP RECORDER  03/2022    MD from [facility] possible pacer placement   [address] 43  2007    CABG x2       Current Outpatient Medications:     amLODIPine (NORVASC) 10 mg tablet, Take 10 mg by mouth every evening  , Disp: , Rfl:     aspirin (ECOTRIN LOW STRENGTH) 81 mg EC tablet, TAKE ONE TABLET BY MOUTH DAILY FOR THE HEART , Disp: , Rfl:     atorvastatin (LIPITOR) 80 mg tablet, Take 80 mg by mouth in the morning, Disp: , Rfl:     buPROPion (WELLBUTRIN XL) 150 mg 24 hr tablet, Take 150 mg by mouth  , Disp: , Rfl:     cyclobenzaprine (FLEXERIL) 10 mg tablet, Take 10 mg by mouth daily as needed  , Disp: , Rfl:     escitalopram (LEXAPRO) 20 mg tablet, Take 20 mg by mouth  , Disp: , Rfl:     fluticasone (Flovent HFA) 110 MCG/ACT inhaler, Inhale 2 puffs 2 (two) times a day Rinse mouth after use , Disp: 12 g, Rfl: 0    hydrochlorothiazide (HYDRODIURIL) 25 mg tablet, Take 25 mg by mouth daily, Disp: , Rfl:     lidocaine (LIDODERM) 5 %, APPLY 1 PATCH ON SKIN DAILY FOR PAIN  DO NOT WEAR FOR LONGER THAN 12 HOURS IN ANY 24 HOUR PERIOD  [address] YOUR HANDS AFTER APPLYING A PATCH , Disp: , Rfl:     losartan (COZAAR) 50 mg tablet, Take 100 mg by mouth  , Disp: , Rfl:     melatonin 3 mg, Take 3 mg by mouth, Disp: , Rfl:     mirtazapine (REMERON) 15 mg tablet, Take 15 mg by mouth daily at bedtime, Disp: , Rfl:     montelukast (SINGULAIR) 10 mg tablet, Take 10 mg by mouth, Disp: , Rfl:     omeprazole (PriLOSEC) 20 mg delayed release capsule, Take 1 capsule (20 mg total) by mouth daily, Disp: 60 capsule, Rfl: 1    prazosin (MINIPRESS) 5 mg capsule, Take 5 mg by mouth daily at bedtime, Disp: , Rfl:     Xiidra 5 % op solution, , Disp: , Rfl:   Allergies   Allergen Reactions    Bee Pollen Other (See Comments)     Itchy eyes/nose, sneezing, clogged ears, increased cough/chest tightness      Moxifloxacin GI Intolerance     W/ GI bleed      Pollen Extract Other (See Comments)     Itchy eyes/nose, sneezing, clogged ears, increased cough/chest tightness   Lisinopril Cough     Vitals:    06/16/22 1527   BP: 120/72   BP Location: Right arm   Patient Position: Sitting   Cuff Size: Standard   Pulse: 80   Temp: 98 4 °F (36 9 °C)   SpO2: 97%   Weight: 75 8 kg (167 lb)   Height: 5' 6" (1 676 m)       Review of Systems:   Review of Systems   Respiratory: Negative for chest tightness  Cardiovascular: Positive for palpitations  Vitals:    06/16/22 1527   BP: 120/72   BP Location: Right arm   Patient Position: Sitting   Cuff Size: Standard   Pulse: 80   Temp: 98 4 °F (36 9 °C)   SpO2: 97%   Weight: 75 8 kg (167 lb)   Height: 5' 6" (1 676 m)     Physical Examination:   Physical Exam  Constitutional:       General: He is not in acute distress  Appearance: He is well-developed  He is not diaphoretic  HENT:      Head: Normocephalic and atraumatic  Right Ear: External ear normal       Left Ear: External ear normal    Eyes:      General: No scleral icterus  Right eye: No discharge  Left eye: No discharge  Conjunctiva/sclera: Conjunctivae normal       Pupils: Pupils are equal, round, and reactive to light  Neck:      Thyroid: No thyromegaly  Vascular: No JVD  Trachea: No tracheal deviation  Cardiovascular:      Rate and Rhythm: Normal rate and regular rhythm  Heart sounds: Murmur heard  No friction rub  Gallop present  Pulmonary:      Effort: Pulmonary effort is normal  No respiratory distress  Breath sounds: Normal breath sounds  No stridor  No wheezing or rales  Chest:      Chest wall: No tenderness  Abdominal:      General: Bowel sounds are normal  There is no distension  Palpations: Abdomen is soft  There is no mass  Tenderness: There is no abdominal tenderness  There is no guarding or rebound     Musculoskeletal:         General: No tenderness or deformity  Normal range of motion  Cervical back: Normal range of motion and neck supple  Skin:     General: Skin is warm and dry  Coloration: Skin is not pale  Findings: No erythema or rash  Neurological:      Mental Status: He is alert and oriented to person, place, and time  Cranial Nerves: No cranial nerve deficit  Motor: No abnormal muscle tone  Coordination: Coordination normal       Deep Tendon Reflexes: Reflexes are normal and symmetric  Reflexes normal    Psychiatric:         Behavior: Behavior normal          Thought Content: Thought content normal          Judgment: Judgment normal          Labs:   No results found for: WBC, HGB, HCT, MCV, RDW, PLT  BMP:  Lab Results   Component Value Date    SODIUM 142 09/02/2021    K 4 3 09/02/2021     (H) 09/02/2021    CO2 29 09/02/2021    BUN 17 09/02/2021    CREATININE 0 88 09/02/2021    GLUF 93 09/02/2021    CALCIUM 8 9 09/02/2021    EGFR 90 09/02/2021     LFT:  Lab Results   Component Value Date    AST 22 09/02/2021    ALT 35 09/02/2021    ALKPHOS 83 09/02/2021    TP 7 3 09/02/2021    ALB 3 6 09/02/2021      Lipid Profile:   Lab Results   Component Value Date    CHOLESTEROL 125 09/02/2021    HDL 44 09/02/2021    LDLCALC 68 09/02/2021    TRIG 64 09/02/2021     Lab Results   Component Value Date    CHOLESTEROL 125 09/02/2021       Imaging & Testing   I have personally reviewed pertinent reports  Cardiac Testing   Sarcoidosis:   1   Solitary, small area of mildly increased FDG uptake in basal lateral wall suggesting active inflammation   The other sites that showed late gadolinium enhancement that are without abnormal FDG activity are likely fibrotic  2   Small subpleural nodule associated with right major fissure, too small for metabolic evaluation   Nonspecific   Continued dedicated CT followup in 3-6 months recommended, as clinically indicated  3   No additional FDG avid tissue  Cardiac MRI:  1  The LV is normal in size with borderline systolic function; LVEF 43%  2  RV is mildly dilated (RVEDVI of 91 ml/sq m) with mildly diminished systolic function (RVEF of 37%)  3  Patchy areas of LGE at the basal septum at the superior and inferior LV - RV junction and mid LV inferior LV - RV junction, that could represent nonspecific myocardial fibrosis  4  2 8 x 2 2 cm enhancing lesion in the liver that is incompletely evaluated on this cardiac focussed examination  Recommend dedicated liver MRI for further evaluation  CT Chest 2020: IMPRESSION:   1   Mild widespread bronchiectasis  2   No significant bronchial wall thickening or air trapping  3   Nonspecific pulmonary nodule measuring up to 5 x 4 mm  4   Nonspecific hypodense right hepatic lobe 3 cm lesion  EKG: Personally reviewed  Normal sinus rhythm bifascicular heart block no acute ST changes      Courtney Ventura MD [facility]  [phone]  Please call with any questions or suggestions    Counseling :  A description of the counselin minutes spent discussing about her sarcoidosis workup  Reviewing prior records with patient and family  Goals and Barriers:  Patient's ability to self care:  Medication side effect reviewed with patient in detail and all their questions answered  "Portions of the record may have been created with voice recognition software  Occasional wrong word or "sound a like" substitutions may have occurred due to the inherent limitations of voice recognition software  Read the chart carefully and recognize, using context, where substitutions have occurred   Please call if you have any questions  "

## 2022-06-22 ENCOUNTER — APPOINTMENT (OUTPATIENT)
Dept: LAB | Facility: CLINIC | Age: 67
End: 2022-06-22
Payer: MEDICARE

## 2022-06-22 ENCOUNTER — TRANSCRIBE ORDERS (OUTPATIENT)
Dept: LAB | Facility: CLINIC | Age: 67
End: 2022-06-22

## 2022-06-22 DIAGNOSIS — D86.85 DILATED CARDIOMYOPATHY SECONDARY TO SARCOIDOSIS: Primary | ICD-10-CM

## 2022-06-22 LAB
ANION GAP SERPL CALCULATED.3IONS-SCNC: 5 MMOL/L (ref 4–13)
BASOPHILS # BLD AUTO: 0.06 THOUSANDS/ΜL (ref 0–0.1)
BASOPHILS NFR BLD AUTO: 1 % (ref 0–1)
BUN SERPL-MCNC: 52 MG/DL (ref 5–25)
CALCIUM SERPL-MCNC: 9.5 MG/DL (ref 8.3–10.1)
CHLORIDE SERPL-SCNC: 107 MMOL/L (ref 100–108)
CO2 SERPL-SCNC: 26 MMOL/L (ref 21–32)
CREAT SERPL-MCNC: 1.65 MG/DL (ref 0.6–1.3)
EOSINOPHIL # BLD AUTO: 0.22 THOUSAND/ΜL (ref 0–0.61)
EOSINOPHIL NFR BLD AUTO: 3 % (ref 0–6)
ERYTHROCYTE [DISTWIDTH] IN BLOOD BY AUTOMATED COUNT: 14.4 % (ref 11.6–15.1)
GFR SERPL CREATININE-BSD FRML MDRD: 42 ML/MIN/1.73SQ M
GLUCOSE P FAST SERPL-MCNC: 86 MG/DL (ref 65–99)
HCT VFR BLD AUTO: 41.1 % (ref 36.5–49.3)
HGB BLD-MCNC: 13.2 G/DL (ref 12–17)
IMM GRANULOCYTES # BLD AUTO: 0.04 THOUSAND/UL (ref 0–0.2)
IMM GRANULOCYTES NFR BLD AUTO: 1 % (ref 0–2)
LYMPHOCYTES # BLD AUTO: 1.85 THOUSANDS/ΜL (ref 0.6–4.47)
LYMPHOCYTES NFR BLD AUTO: 23 % (ref 14–44)
MCH RBC QN AUTO: 25.9 PG (ref 26.8–34.3)
MCHC RBC AUTO-ENTMCNC: 32.1 G/DL (ref 31.4–37.4)
MCV RBC AUTO: 81 FL (ref 82–98)
MONOCYTES # BLD AUTO: 0.92 THOUSAND/ΜL (ref 0.17–1.22)
MONOCYTES NFR BLD AUTO: 12 % (ref 4–12)
NEUTROPHILS # BLD AUTO: 4.9 THOUSANDS/ΜL (ref 1.85–7.62)
NEUTS SEG NFR BLD AUTO: 60 % (ref 43–75)
NRBC BLD AUTO-RTO: 0 /100 WBCS
PLATELET # BLD AUTO: 358 THOUSANDS/UL (ref 149–390)
PMV BLD AUTO: 9.9 FL (ref 8.9–12.7)
POTASSIUM SERPL-SCNC: 4.3 MMOL/L (ref 3.5–5.3)
RBC # BLD AUTO: 5.09 MILLION/UL (ref 3.88–5.62)
SODIUM SERPL-SCNC: 138 MMOL/L (ref 136–145)
WBC # BLD AUTO: 7.99 THOUSAND/UL (ref 4.31–10.16)

## 2022-06-22 PROCEDURE — 85025 COMPLETE CBC W/AUTO DIFF WBC: CPT

## 2022-06-22 PROCEDURE — 36415 COLL VENOUS BLD VENIPUNCTURE: CPT

## 2022-06-22 PROCEDURE — 80048 BASIC METABOLIC PNL TOTAL CA: CPT

## 2022-08-16 DIAGNOSIS — K21.00 GASTROESOPHAGEAL REFLUX DISEASE WITH ESOPHAGITIS WITHOUT HEMORRHAGE: ICD-10-CM

## 2022-08-16 DIAGNOSIS — K27.9 PEPTIC ULCER DISEASE: ICD-10-CM

## 2022-08-16 RX ORDER — OMEPRAZOLE 20 MG/1
CAPSULE, DELAYED RELEASE ORAL
Qty: 60 CAPSULE | Refills: 1 | Status: SHIPPED | OUTPATIENT
Start: 2022-08-16

## 2022-09-26 ENCOUNTER — APPOINTMENT (OUTPATIENT)
Dept: LAB | Facility: CLINIC | Age: 67
End: 2022-09-26
Payer: MEDICARE

## 2022-10-07 ENCOUNTER — OFFICE VISIT (OUTPATIENT)
Dept: CARDIOLOGY CLINIC | Facility: CLINIC | Age: 67
End: 2022-10-07
Payer: MEDICARE

## 2022-10-07 VITALS
BODY MASS INDEX: 26.52 KG/M2 | HEIGHT: 66 IN | HEART RATE: 61 BPM | WEIGHT: 165 LBS | DIASTOLIC BLOOD PRESSURE: 70 MMHG | OXYGEN SATURATION: 98 % | TEMPERATURE: 98.6 F | SYSTOLIC BLOOD PRESSURE: 130 MMHG

## 2022-10-07 DIAGNOSIS — Z95.1 HISTORY OF CORONARY ARTERY BYPASS GRAFT: ICD-10-CM

## 2022-10-07 DIAGNOSIS — D86.85 CARDIAC SARCOIDOSIS: ICD-10-CM

## 2022-10-07 DIAGNOSIS — I25.10 CORONARY ARTERY DISEASE INVOLVING NATIVE HEART, UNSPECIFIED VESSEL OR LESION TYPE, UNSPECIFIED WHETHER ANGINA PRESENT: Primary | ICD-10-CM

## 2022-10-07 DIAGNOSIS — R00.2 PALPITATIONS: ICD-10-CM

## 2022-10-07 DIAGNOSIS — Z95.818 STATUS POST PLACEMENT OF IMPLANTABLE LOOP RECORDER: ICD-10-CM

## 2022-10-07 DIAGNOSIS — G45.9 TIA (TRANSIENT ISCHEMIC ATTACK): ICD-10-CM

## 2022-10-07 DIAGNOSIS — E78.2 MIXED HYPERLIPIDEMIA: ICD-10-CM

## 2022-10-07 PROCEDURE — 99214 OFFICE O/P EST MOD 30 MIN: CPT | Performed by: INTERNAL MEDICINE

## 2022-10-07 PROCEDURE — 93000 ELECTROCARDIOGRAM COMPLETE: CPT | Performed by: INTERNAL MEDICINE

## 2022-10-07 RX ORDER — SULFAMETHOXAZOLE AND TRIMETHOPRIM 400; 80 MG/1; MG/1
1 TABLET ORAL DAILY
COMMUNITY
Start: 2022-09-19

## 2022-10-07 RX ORDER — FOLIC ACID 1 MG/1
1 TABLET ORAL
COMMUNITY
Start: 2022-09-15

## 2022-10-07 RX ORDER — PREDNISONE 10 MG/1
30 TABLET ORAL DAILY
COMMUNITY
Start: 2022-09-22

## 2022-10-07 RX ORDER — METOPROLOL SUCCINATE 50 MG/1
50 TABLET, EXTENDED RELEASE ORAL 2 TIMES DAILY
COMMUNITY
Start: 2022-09-21

## 2022-10-07 NOTE — PROGRESS NOTES
[facility]  [address] [facility] [address]  [address]   [place], [address]  Cardiology Consultation    Sabina Bledsoe  [id number]  1955      Consult for:CABG/Sarcoidosis  Appreciate consult by: Lisa Colunga DO    1  Coronary artery disease involving native heart, unspecified vessel or lesion type, unspecified whether angina present     2  Mixed hyperlipidemia  POCT ECG   3  Status post placement of implantable loop recorder  POCT ECG   4  History of coronary artery bypass graft  POCT ECG   5  TIA (transient ischemic attack)  POCT ECG   6  Cardiac sarcoidosis  POCT ECG   7  Palpitations  POCT ECG      Discussion/Summary:   Palpitations/previous bradycardia/suspected Cardiac Sarcoidosis- reviewed patient's MRI and PET scan report  Echo 2D with normal ejection fraction and mild concentric hypertrophy  S/P ICD placement  He could not tolerate methotextrate  Reviewed advanced heart failure treatment  Prednisone 30mg daily for 2 months, 20mg daily for 1 month until repeat PET scan2 months  He is also on the bactrim + folic acid  Coronary artery disease status post prior bypass grafting- his LDL is at goal   Last echo 2D with normal ejection fraction wall motion  Continue atorvastatin and aspirin  Hypertension- controlled on hydrochlorothiazide 25 mg and amlodipine 10 mg every evening  Discussed with patient by wearing compression when sitting standing    PTSD- currently on SSRI    HPI:   24-year-old disabled  history of coronary artery bypass grafting 2 vessel in 2007, hypertension with recent workup at an outside not work for episodes of tachy-[place] with advanced imaging suggestive of a inflammatory and infiltrative process  He has plans for a defibrillator placement  He still has periodic tachycardia episodes  He denies having syncope  He states not being as active currently  Denies having any severe chest discomfort  There is no history of atrial fibrillation  He is compliant with his medications  He denies having any fevers or chills  Denies any recent viral symptoms  Denies having any nausea vomiting diarrhea  10/7: Denies dizziness or chest pain  Currently followed at [facility] advanced heart failure  He was not able to tolerate methotrexate  He is currently on prednisone  His pacemaker pocket is well healed  PMH  CABG- 2 vessel [facility]- severe stabbing pain    Social Hx  - 2005 [place]  Disabled [address] were in [facility]  Not much physical activity  Former smoking- 10 years-- 1-2 cig light- 2 years a pack    Family Hx  Sister- 79- couple of heart attack   No hx of pacemaker    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[employer]    Past Medical History:   Diagnosis Date    Coronary artery disease     Hyperlipemia     Hypertension     Irregular heart beat     bradycardia     Social History     Socioeconomic History    Marital status: /Civil Union     Spouse name: Not on file    Number of children: Not on file    Years of education: Not on file    Highest education level: Not on file   Occupational History    Not on file   Tobacco Use    Smoking status: Never Smoker    Smokeless tobacco: Never Used   Vaping Use    Vaping Use: Never used   Substance and Sexual Activity    Alcohol use: Never    Drug use: Never    Sexual activity: Not on file   Other Topics Concern    Not on file   Social History Narrative    Not on file     Social Determinants of Health     Financial Resource Strain: Not on file   Food Insecurity: Not on file   Transportation Needs: Not on file   Physical Activity: Not on file   Stress: Not on file   Social Connections: Not on file   Intimate Partner Violence: Not on file   Housing Stability: Not on file      Family History   Problem Relation Age of Onset    Arthritis Mother     Leukemia Maternal Aunt     Mental illness Neg Hx      Past Surgical History:   Procedure Laterality Date    ANTERIOR CRUCIATE LIGAMENT REPAIR Left 2001    CARDIAC LOOP RECORDER  03/2022    MD from [address] possible pacer placement   [address]  2007    CABG x2       Current Outpatient Medications:     amLODIPine (NORVASC) 10 mg tablet, Take 10 mg by mouth every evening  , Disp: , Rfl:     aspirin (ECOTRIN LOW STRENGTH) 81 mg EC tablet, TAKE ONE TABLET BY MOUTH DAILY FOR THE HEART , Disp: , Rfl:     atorvastatin (LIPITOR) 80 mg tablet, Take 80 mg by mouth in the morning, Disp: , Rfl:     buPROPion (WELLBUTRIN XL) 150 mg 24 hr tablet, Take 150 mg by mouth  , Disp: , Rfl:     cyclobenzaprine (FLEXERIL) 10 mg tablet, Take 10 mg by mouth daily as needed  , Disp: , Rfl:     escitalopram (LEXAPRO) 20 mg tablet, Take 20 mg by mouth  , Disp: , Rfl:     folic acid (FOLVITE) 1 mg tablet, 1 mg, Disp: , Rfl:     hydrochlorothiazide (HYDRODIURIL) 25 mg tablet, Take 12 5 mg by mouth daily, Disp: , Rfl:     lidocaine (LIDODERM) 5 %, APPLY 1 PATCH ON SKIN DAILY FOR PAIN  DO NOT WEAR FOR LONGER THAN 12 HOURS IN ANY 24 HOUR PERIOD   [address] YOUR HANDS AFTER APPLYING A PATCH , Disp: , Rfl:     losartan (COZAAR) 50 mg tablet, Take 100 mg by mouth daily, Disp: , Rfl:     melatonin 3 mg, Take 3 mg by mouth, Disp: , Rfl:     metoprolol succinate (TOPROL-XL) 50 mg 24 hr tablet, Take 50 mg by mouth 2 (two) times a day, Disp: , Rfl:     mirtazapine (REMERON) 15 mg tablet, Take 15 mg by mouth daily at bedtime, Disp: , Rfl:     montelukast (SINGULAIR) 10 mg tablet, Take 10 mg by mouth, Disp: , Rfl:     omeprazole (PriLOSEC) 20 mg delayed release capsule, take 1 capsule by mouth once daily, Disp: 60 capsule, Rfl: 1    prazosin (MINIPRESS) 5 mg capsule, Take 5 mg by mouth daily at bedtime, Disp: , Rfl:     predniSONE 10 mg tablet, Take 30 mg by mouth daily, Disp: , Rfl:     sulfamethoxazole-trimethoprim (BACTRIM) 400-80 mg per tablet, Take 1 tablet by mouth daily, Disp: , Rfl:     fluticasone (Flovent HFA) 110 MCG/ACT inhaler, Inhale 2 puffs 2 (two) times a day Rinse mouth after use , Disp: 12 g, Rfl: 0    Xiidra 5 % op solution, , Disp: , Rfl:   Allergies   Allergen Reactions    Bee Pollen Other (See Comments)     Itchy eyes/nose, sneezing, clogged ears, increased cough/chest tightness   Moxifloxacin GI Intolerance     W/ GI bleed      Pollen Extract Other (See Comments)     Itchy eyes/nose, sneezing, clogged ears, increased cough/chest tightness   Lisinopril Cough     Vitals:    10/07/22 1358   BP: 130/70   BP Location: Right arm   Patient Position: Sitting   Cuff Size: Standard   Pulse: 61   Temp: 98 6 °F (37 °C)   SpO2: 98%   Weight: 74 8 kg (165 lb)   Height: 5' 6" (1 676 m)       Review of Systems:   Review of Systems   Respiratory: Negative for chest tightness  Cardiovascular: Positive for palpitations  Vitals:    10/07/22 1358   BP: 130/70   BP Location: Right arm   Patient Position: Sitting   Cuff Size: Standard   Pulse: 61   Temp: 98 6 °F (37 °C)   SpO2: 98%   Weight: 74 8 kg (165 lb)   Height: 5' 6" (1 676 m)     Physical Examination:   Physical Exam  Constitutional:       General: He is not in acute distress  Appearance: He is well-developed  He is not diaphoretic  HENT:      Head: Normocephalic and atraumatic  Right Ear: External ear normal       Left Ear: External ear normal    Eyes:      General: No scleral icterus  Right eye: No discharge  Left eye: No discharge  Conjunctiva/sclera: Conjunctivae normal       Pupils: Pupils are equal, round, and reactive to light  Neck:      Thyroid: No thyromegaly  Vascular: No JVD  Trachea: No tracheal deviation  Cardiovascular:      Rate and Rhythm: Normal rate and regular rhythm  Heart sounds: Murmur heard  No friction rub  Gallop present  Comments: ICD well-healed  Pulmonary:      Effort: Pulmonary effort is normal  No respiratory distress  Breath sounds: Normal breath sounds  No stridor  No wheezing or rales     Chest:      Chest wall: No tenderness  Abdominal:      General: Bowel sounds are normal  There is no distension  Palpations: Abdomen is soft  There is no mass  Tenderness: There is no abdominal tenderness  There is no guarding or rebound  Musculoskeletal:         General: No tenderness or deformity  Normal range of motion  Cervical back: Normal range of motion and neck supple  Skin:     General: Skin is warm and dry  Coloration: Skin is not pale  Findings: No erythema or rash  Neurological:      Mental Status: He is alert and oriented to person, place, and time  Cranial Nerves: No cranial nerve deficit  Motor: No abnormal muscle tone  Coordination: Coordination normal       Deep Tendon Reflexes: Reflexes are normal and symmetric  Reflexes normal    Psychiatric:         Behavior: Behavior normal          Thought Content: Thought content normal          Judgment: Judgment normal          Labs:     Lab Results   Component Value Date    WBC 7 99 06/22/2022    HGB 13 2 06/22/2022    HCT 41 1 06/22/2022    MCV 81 (L) 06/22/2022    RDW 14 4 06/22/2022     06/22/2022     BMP:  Lab Results   Component Value Date    SODIUM 142 09/26/2022    K 3 3 (L) 09/26/2022     09/26/2022    CO2 26 09/26/2022    BUN 27 (H) 09/26/2022    CREATININE 1 33 (H) 09/26/2022    GLUC 106 09/26/2022    GLUF 86 06/22/2022    CALCIUM 9 4 09/26/2022    EGFR 54 09/26/2022     LFT:  Lab Results   Component Value Date    AST 18 09/26/2022    ALT 57 09/26/2022    ALKPHOS 79 09/26/2022    TP 7 3 09/26/2022    ALB 3 7 09/26/2022      Lipid Profile:   Lab Results   Component Value Date    CHOLESTEROL 125 09/02/2021    HDL 44 09/02/2021    LDLCALC 68 09/02/2021    TRIG 64 09/02/2021     Lab Results   Component Value Date    CHOLESTEROL 125 09/02/2021       Imaging & Testing   I have personally reviewed pertinent reports        Cardiac Testing   Sarcoidosis:   1   Solitary, small area of mildly increased FDG uptake in basal lateral wall suggesting active inflammation   The other sites that showed late gadolinium enhancement that are without abnormal FDG activity are likely fibrotic  2   Small subpleural nodule associated with right major fissure, too small for metabolic evaluation   Nonspecific   Continued dedicated CT followup in 3-6 months recommended, as clinically indicated  3   No additional FDG avid tissue  Cardiac MRI:  1  The LV is normal in size with borderline systolic function; LVEF 30%  2  RV is mildly dilated (RVEDVI of 91 ml/sq m) with mildly diminished systolic function (RVEF of 37%)  3  Patchy areas of LGE at the basal septum at the superior and inferior LV - RV junction and mid LV inferior LV - RV junction, that could represent nonspecific myocardial fibrosis  4  2 8 x 2 2 cm enhancing lesion in the liver that is incompletely evaluated on this cardiac focussed examination  Recommend dedicated liver MRI for further evaluation  CT Chest 2020: IMPRESSION:   1   Mild widespread bronchiectasis  2   No significant bronchial wall thickening or air trapping  3   Nonspecific pulmonary nodule measuring up to 5 x 4 mm  4   Nonspecific hypodense right hepatic lobe 3 cm lesion  EKG: Personally reviewed  Normal sinus rhythm bifascicular heart block no acute ST changes      Narcisa Srivastava MD [facility]  [phone]  Please call with any questions or suggestions    Counseling :  A description of the counselin minutes spent discussing about her sarcoidosis workup  Reviewing prior records with patient and family  Goals and Barriers:  Patient's ability to self care:  Medication side effect reviewed with patient in detail and all their questions answered  "Portions of the record may have been created with voice recognition software  Occasional wrong word or "sound a like" substitutions may have occurred due to the inherent limitations of voice recognition software   Read the chart carefully and recognize, using context, where substitutions have occurred   Please call if you have any questions  "

## 2022-10-11 PROBLEM — Z12.11 COLON CANCER SCREENING: Status: RESOLVED | Noted: 2022-02-23 | Resolved: 2022-10-11

## 2022-10-12 ENCOUNTER — TELEPHONE (OUTPATIENT)
Dept: FAMILY MEDICINE CLINIC | Facility: CLINIC | Age: 67
End: 2022-10-12

## 2022-10-17 ENCOUNTER — APPOINTMENT (OUTPATIENT)
Dept: LAB | Facility: CLINIC | Age: 67
End: 2022-10-17
Payer: MEDICARE

## 2022-10-28 ENCOUNTER — OFFICE VISIT (OUTPATIENT)
Dept: FAMILY MEDICINE CLINIC | Facility: CLINIC | Age: 67
End: 2022-10-28
Payer: MEDICARE

## 2022-10-28 VITALS
WEIGHT: 172 LBS | RESPIRATION RATE: 16 BRPM | SYSTOLIC BLOOD PRESSURE: 120 MMHG | BODY MASS INDEX: 27.64 KG/M2 | HEART RATE: 55 BPM | DIASTOLIC BLOOD PRESSURE: 70 MMHG | HEIGHT: 66 IN | OXYGEN SATURATION: 97 % | TEMPERATURE: 97.9 F

## 2022-10-28 DIAGNOSIS — Z23 NEED FOR VACCINATION: ICD-10-CM

## 2022-10-28 DIAGNOSIS — Z13.6 SCREENING FOR AAA (ABDOMINAL AORTIC ANEURYSM): ICD-10-CM

## 2022-10-28 DIAGNOSIS — Z11.59 NEED FOR HEPATITIS C SCREENING TEST: ICD-10-CM

## 2022-10-28 DIAGNOSIS — Z00.00 MEDICARE ANNUAL WELLNESS VISIT, SUBSEQUENT: Primary | ICD-10-CM

## 2022-10-28 DIAGNOSIS — Z13.6 SCREENING FOR CARDIOVASCULAR CONDITION: ICD-10-CM

## 2022-10-28 DIAGNOSIS — Z12.5 SCREENING FOR PROSTATE CANCER: ICD-10-CM

## 2022-10-28 DIAGNOSIS — I50.31 ACUTE DIASTOLIC (CONGESTIVE) HEART FAILURE (HCC): ICD-10-CM

## 2022-10-28 DIAGNOSIS — D72.829 LEUKOCYTOSIS, UNSPECIFIED TYPE: ICD-10-CM

## 2022-10-28 DIAGNOSIS — Z87.891 FORMER SMOKER: ICD-10-CM

## 2022-10-28 PROBLEM — Z95.810 S/P ICD (INTERNAL CARDIAC DEFIBRILLATOR) PROCEDURE: Status: ACTIVE | Noted: 2022-06-24

## 2022-10-28 PROBLEM — J45.40 ASTHMA, MODERATE PERSISTENT: Status: ACTIVE | Noted: 2020-05-04

## 2022-10-28 PROBLEM — I25.119 ATHEROSCLEROTIC HEART DISEASE OF NATIVE CORONARY ARTERY WITH UNSPECIFIED ANGINA PECTORIS (HCC): Status: ACTIVE | Noted: 2022-04-13

## 2022-10-28 PROBLEM — F43.10 POST-TRAUMATIC STRESS DISORDER, UNSPECIFIED: Status: RESOLVED | Noted: 2022-10-28 | Resolved: 2022-10-28

## 2022-10-28 PROBLEM — I25.10 CORONARY ARTERY DISEASE: Status: ACTIVE | Noted: 2020-05-04

## 2022-10-28 PROBLEM — G47.33 OBSTRUCTIVE SLEEP APNEA: Status: ACTIVE | Noted: 2019-05-06

## 2022-10-28 PROBLEM — F10.20 ALCOHOL DEPENDENCE, UNCOMPLICATED (HCC): Status: RESOLVED | Noted: 2022-10-28 | Resolved: 2022-10-28

## 2022-10-28 PROBLEM — K57.30 DIVERTICULOSIS OF COLON: Status: ACTIVE | Noted: 2022-10-28

## 2022-10-28 PROBLEM — D86.9 SARCOIDOSIS: Status: ACTIVE | Noted: 2022-10-28

## 2022-10-28 PROBLEM — F43.10 POST-TRAUMATIC STRESS DISORDER, UNSPECIFIED: Status: ACTIVE | Noted: 2022-10-28

## 2022-10-28 PROBLEM — F12.20 CANNABIS DEPENDENCE (HCC): Status: ACTIVE | Noted: 2022-10-28

## 2022-10-28 PROBLEM — Z72.0 TOBACCO USE: Status: ACTIVE | Noted: 2019-06-13

## 2022-10-28 PROBLEM — R00.1 BRADYCARDIA: Status: ACTIVE | Noted: 2022-03-04

## 2022-10-28 PROBLEM — F10.20 ALCOHOL DEPENDENCE, UNCOMPLICATED (HCC): Status: ACTIVE | Noted: 2022-10-28

## 2022-10-28 PROBLEM — J30.9 CHRONIC ALLERGIC RHINITIS: Status: ACTIVE | Noted: 2019-05-06

## 2022-10-28 PROBLEM — N40.0 BENIGN PROSTATIC HYPERPLASIA: Status: ACTIVE | Noted: 2022-10-28

## 2022-10-28 PROBLEM — F02.80 DEMENTIA IN OTHER DISEASES CLASSIFIED ELSEWHERE, UNSPECIFIED SEVERITY, WITHOUT BEHAVIORAL DISTURBANCE, PSYCHOTIC DISTURBANCE, MOOD DISTURBANCE, AND ANXIETY (HCC): Status: ACTIVE | Noted: 2022-10-28

## 2022-10-28 PROBLEM — F43.12 POST-TRAUMATIC STRESS DISORDER, CHRONIC: Status: ACTIVE | Noted: 2022-10-28

## 2022-10-28 PROBLEM — Z86.73 PERSONAL HISTORY OF TRANSIENT ISCHEMIC ATTACK (TIA) AND CEREBRAL INFARCTION WITHOUT RESIDUAL DEFICIT: Status: ACTIVE | Noted: 2022-10-28

## 2022-10-28 PROBLEM — K05.329: Status: ACTIVE | Noted: 2022-10-28

## 2022-10-28 PROBLEM — K22.2 ESOPHAGEAL STRICTURE: Status: ACTIVE | Noted: 2022-10-28

## 2022-10-28 PROBLEM — I42.9 CARDIOMYOPATHY (HCC): Status: ACTIVE | Noted: 2022-10-28

## 2022-10-28 PROBLEM — M50.30 DEGENERATION OF INTERVERTEBRAL DISC OF CERVICAL REGION: Status: ACTIVE | Noted: 2022-10-28

## 2022-10-28 PROCEDURE — G0438 PPPS, INITIAL VISIT: HCPCS | Performed by: FAMILY MEDICINE

## 2022-10-28 PROCEDURE — G0009 ADMIN PNEUMOCOCCAL VACCINE: HCPCS

## 2022-10-28 PROCEDURE — 90677 PCV20 VACCINE IM: CPT

## 2022-10-28 NOTE — PROGRESS NOTES
Assessment and Plan:     Problem List Items Addressed This Visit        Other    Leukocytosis     Pt is on prednisone           Other Visit Diagnoses     Medicare annual wellness visit, subsequent    -  Primary    Screening for cardiovascular condition        Relevant Orders    Lipid Panel with Direct LDL reflex    Screening for prostate cancer        Relevant Orders    PSA, Total Screen    Need for hepatitis C screening test        Relevant Orders    Hepatitis C antibody    Acute diastolic (congestive) heart failure (HCC)        Need for vaccination        Relevant Orders    Pneumococcal Conjugate Vaccine 20-valent (Pcv20)    Screening for AAA (abdominal aortic aneurysm)        Relevant Orders    US abdominal aorta screening aaa    Former smoker        Relevant Orders    US abdominal aorta screening aaa        BMI Counseling: Body mass index is 27 76 kg/m²  The BMI is above normal  Nutrition recommendations include decreasing portion sizes  Exercise recommendations include moderate physical activity 150 minutes/week  No pharmacotherapy was ordered  Rationale for BMI follow-up plan is due to patient being overweight or obese  Preventive health issues were discussed with patient, and age appropriate screening tests were ordered as noted in patient's After Visit Summary  Personalized health advice and appropriate referrals for health education or preventive services given if needed, as noted in patient's After Visit Summary  History of Present Illness:     Patient presents for a Medicare Wellness Visit    Pt is sched today for an AWV    Pt states in the last 8 months he had a defb/pacer placed - this happened while he was in [place] - he ended up in the hopsital 5 days - he came back and they did the work in [facility] [place]   8 weeks later he went back for a post op and one of the leads shuifted and the next day they went back in to fix lead  During the middle of the procedure he states they had to wake him up  States he felt all the tugging and pulling    Pt did not have labs for me but has labs that were drawn by his specialists    Pt states he was dx with Sarcoid at [facility] 1998 has not drank etoh at all in 5 years     Patient Care Team:  Kendall Hill, DO as PCP - General (Family Medicine)     Review of Systems:     Review of Systems   Constitutional: Negative for activity change, appetite change, chills, diaphoresis, fatigue, fever and unexpected weight change  HENT: Negative for congestion, dental problem, ear pain, mouth sores, sinus pressure, sinus pain, sore throat and trouble swallowing  Eyes: Negative for photophobia, discharge and itching  Respiratory: Negative for apnea, chest tightness and shortness of breath  Cardiovascular: Negative for chest pain, palpitations and leg swelling  Gastrointestinal: Negative for abdominal distention, abdominal pain, blood in stool, nausea and vomiting  Endocrine: Negative for cold intolerance, heat intolerance, polydipsia, polyphagia and polyuria  Genitourinary: Negative for difficulty urinating  Musculoskeletal: Negative for arthralgias  Skin: Negative for color change and wound  Neurological: Negative for dizziness, syncope, speech difficulty and headaches  Hematological: Negative for adenopathy  Psychiatric/Behavioral: Negative for agitation and behavioral problems          Problem List:     Patient Active Problem List   Diagnosis   • Colon polyps   • Gastroesophageal reflux disease with esophagitis   • Atherosclerotic heart disease of native coronary artery with unspecified angina pectoris ([facility] [address] )   • HTN (hypertension)   • Mixed hyperlipidemia   • History of coronary artery bypass graft - CABG x2 in 2007   • Status post placement of implantable loop recorder - bradycardia   • TIA (transient ischemic attack)   • Anxiety   • Major depressive disorder, recurrent, moderate (HCC)   • Leukocytosis   • Obstructive sleep apnea   • Asthma, moderate persistent • Benign prostatic hyperplasia   • Bradycardia   • Cannabis dependence (HCC)   • Cardiomyopathy (HCC)   • Chronic allergic rhinitis   • Degeneration of intervertebral disc of cervical region   • Dementia in other diseases classified elsewhere, unspecified severity, without behavioral disturbance, psychotic disturbance, mood disturbance, and anxiety (HCC)   • Diverticulosis of colon   • Esophageal stricture   • Generalized chronic periodontitis   • Personal history of transient ischemic attack (TIA) and cerebral infarction without residual deficit   • Post-traumatic stress disorder, chronic   • S/P ICD (internal cardiac defibrillator) procedure   • Sarcoidosis   • Tobacco use   • Coronary artery disease   • History of open heart surgery   • S/P ACL reconstruction      Past Medical and Surgical History:     Past Medical History:   Diagnosis Date   • Alcohol dependence, uncomplicated ([facility] [address] ) 61/72/5154   • Coronary artery disease    • Hyperlipemia    • Hypertension    • Irregular heart beat     bradycardia     Past Surgical History:   Procedure Laterality Date   • ANTERIOR CRUCIATE LIGAMENT REPAIR Left 2001   • CARDIAC LOOP RECORDER  03/2022    MD from [facility] possible pacer placement   • CARDIAC SURGERY  2007    CABG x2      Family History:     Family History   Problem Relation Age of Onset   • Arthritis Mother    • Leukemia Maternal Aunt    • Mental illness Neg Hx       Social History:     Social History     Socioeconomic History   • Marital status: /Civil Union     Spouse name: None   • Number of children: None   • Years of education: None   • Highest education level: None   Occupational History   • None   Tobacco Use   • Smoking status: Never Smoker   • Smokeless tobacco: Never Used   Vaping Use   • Vaping Use: Never used   Substance and Sexual Activity   • Alcohol use: Never   • Drug use: Never   • Sexual activity: None   Other Topics Concern   • None   Social History Narrative   • None     Social Determinants of Health     Financial Resource Strain: Low Risk    • Difficulty of Paying Living Expenses: Not hard at all   Food Insecurity: Not on file   Transportation Needs: No Transportation Needs   • Lack of Transportation (Medical): No   • Lack of Transportation (Non-Medical): No   Physical Activity: Not on file   Stress: Not on file   Social Connections: Not on file   Intimate Partner Violence: Not on file   Housing Stability: Not on file      Medications and Allergies:     Current Outpatient Medications   Medication Sig Dispense Refill   • amLODIPine (NORVASC) 10 mg tablet Take 10 mg by mouth every evening       • aspirin (ECOTRIN LOW STRENGTH) 81 mg EC tablet TAKE ONE TABLET BY MOUTH DAILY FOR THE HEART  • atorvastatin (LIPITOR) 80 mg tablet Take 80 mg by mouth in the morning     • buPROPion (WELLBUTRIN XL) 150 mg 24 hr tablet Take 150 mg by mouth       • cyclobenzaprine (FLEXERIL) 10 mg tablet Take 10 mg by mouth daily as needed       • escitalopram (LEXAPRO) 20 mg tablet Take 20 mg by mouth       • fluticasone (Flovent HFA) 110 MCG/ACT inhaler Inhale 2 puffs 2 (two) times a day Rinse mouth after use  12 g 0   • folic acid (FOLVITE) 1 mg tablet 1 mg     • hydrochlorothiazide (HYDRODIURIL) 25 mg tablet Take 12 5 mg by mouth daily     • lidocaine (LIDODERM) 5 % APPLY 1 PATCH ON SKIN DAILY FOR PAIN  DO NOT WEAR FOR LONGER THAN 12 HOURS IN ANY 24 HOUR PERIOD  [address] YOUR HANDS AFTER APPLYING A PATCH  • losartan (COZAAR) 50 mg tablet Take 100 mg by mouth daily Patient is taking one pill per day of 50mg       • melatonin 3 mg Take 3 mg by mouth     • metoprolol succinate (TOPROL-XL) 50 mg 24 hr tablet Take 50 mg by mouth 2 (two) times a day     • montelukast (SINGULAIR) 10 mg tablet Take 10 mg by mouth     • omeprazole (PriLOSEC) 20 mg delayed release capsule take 1 capsule by mouth once daily 60 capsule 1   • prazosin (MINIPRESS) 5 mg capsule Take 5 mg by mouth daily at bedtime     • predniSONE 10 mg tablet Take 30 mg by mouth daily     • sulfamethoxazole-trimethoprim (BACTRIM) 400-80 mg per tablet Take 1 tablet by mouth daily       No current facility-administered medications for this visit  Allergies   Allergen Reactions   • Bee Pollen Other (See Comments)     Itchy eyes/nose, sneezing, clogged ears, increased cough/chest tightness  • Moxifloxacin GI Intolerance     W/ GI bleed     • Pollen Extract Other (See Comments)     Itchy eyes/nose, sneezing, clogged ears, increased cough/chest tightness  • Lisinopril Cough      Immunizations:     Immunization History   Administered Date(s) Administered   • Anthrax 08/19/2004, 09/09/2004, 10/15/2004   • COVID-19 MODERNA VACC 0 5 ML IM 02/26/2021, 03/23/2021, 11/18/2021   • H1N1, All Formulations 01/21/2010   • Hep A, adult 12/21/2002, 04/13/2003   • Hep B, adult 08/19/2004, 09/15/2005   • INFLUENZA 11/01/2007, 10/14/2008, 09/21/2009, 01/21/2010, 12/28/2010, 01/31/2012, 01/18/2013, 10/02/2013, 02/18/2015, 10/01/2015, 08/15/2020, 09/30/2021, 10/03/2022   • Influenza Quadrivalent Preservative Free 3 years and older IM 08/28/2020   • Influenza Split 11/05/2004   • Influenza, Seasonal Vaccine, Quadrivalent, Adjuvanted,   5e 10/03/2022   • Influenza, seasonal, injectable 02/18/2015, 10/01/2015, 10/20/2016, 10/05/2017, 11/01/2018, 09/27/2019   • MMR 08/06/1991   • Meningococcal Polysaccharide (MPSV4) 04/13/2003   • Pneumococcal 10/14/2008, 06/16/2014   • Polio, Unspecified 04/13/2003   • Td (adult), adsorbed 07/22/2000   • Tdap 08/31/2015   • Typhoid, ViCPs 04/13/2003   • Zoster 04/08/2016   • Zoster Vaccine Recombinant 11/07/2018, 02/08/2019      Health Maintenance:         Topic Date Due   • Hepatitis C Screening  Never done   • Colorectal Cancer Screening  04/11/2029         Topic Date Due   • Pneumococcal Vaccine: 65+ Years (1 - PCV) 09/08/1961   • COVID-19 Vaccine (4 - Booster for Severo Arudel series) 03/18/2022      Medicare Screening Tests and Risk Assessments:     Mati Eubanks is here for his Subsequent Wellness visit  Last Medicare Wellness visit information reviewed, patient interviewed, no change since last AWV  Health Risk Assessment:   Patient rates overall health as poor  Patient feels that their physical health rating is much worse  Patient is satisfied with their life  Eyesight was rated as slightly worse  Hearing was rated as slightly worse  Patient feels that their emotional and mental health rating is same  Patients states they are sometimes angry  Patient states they are sometimes unusually tired/fatigued  Pain experienced in the last 7 days has been some  Patient's pain rating has been 3/10  Patient states that he has experienced weight loss or gain in last 6 months  Depression Screening:   PHQ-9 Score: 9      Fall Risk Screening: In the past year, patient has experienced: no history of falling in past year      Home Safety:  Patient does not have trouble with stairs inside or outside of their home  Patient has working smoke alarms and has working carbon monoxide detector  Home safety hazards include: none and medications that cause fatigue  Nutrition:   Current diet is Low Cholesterol, Low Saturated Fat, No Added Salt and Limited junk food  Medications:   Patient is currently taking over-the-counter supplements  OTC medications include: Vitamins D and multi vitamins  Patient is able to manage medications  Activities of Daily Living (ADLs)/Instrumental Activities of Daily Living (IADLs):   Walk and transfer into and out of bed and chair?: Yes  Dress and groom yourself?: Yes    Bathe or shower yourself?: Yes    Feed yourself?  Yes  Do your laundry/housekeeping?: Yes  Manage your money, pay your bills and track your expenses?: Yes  Make your own meals?: Yes    Do your own shopping?: Yes    Durable Medical Equipment Suppliers  CPAP    Previous Hospitalizations:   Any hospitalizations or ED visits within the last 12 months?: Yes    How many hospitalizations have you had in the last year?: 3-4    Advance Care Planning:   Living will: Yes    Durable POA for healthcare: Yes    Advanced directive: No      Cognitive Screening:   Provider or family/friend/caregiver concerned regarding cognition?: No    PREVENTIVE SCREENINGS      Cardiovascular Screening:    General: Screening Not Indicated, History Lipid Disorder and Risks and Benefits Discussed    Due for: Lipid Panel      Diabetes Screening:     General: Screening Current and Risks and Benefits Discussed    Due for: Blood Glucose      Colorectal Cancer Screening:     General: Screening Current      Prostate Cancer Screening:    General: Risks and Benefits Discussed    Due for: PSA      Osteoporosis Screening:    General: Risks and Benefits Discussed and Patient Declines      Abdominal Aortic Aneurysm (AAA) Screening:    Risk factors include: age between 73-67 yo        General: Risks and Benefits Discussed    Due for: Screening AAA Ultrasound      Lung Cancer Screening:     General: Screening Not Indicated      Hepatitis C Screening:    General: Risks and Benefits Discussed    Hep C Screening Accepted: Yes      Screening, Brief Intervention, and Referral to Treatment (SBIRT)    Screening  Typical number of drinks in a day: 0  Typical number of drinks in a week: 0  Interpretation: Low risk drinking behavior      AUDIT-C Screenin) How often did you have a drink containing alcohol in the past year? never  2) How many drinks did you have on a typical day when you were drinking in the past year? 0  3) How often did you have 6 or more drinks on one occasion in the past year? never    AUDIT-C Score: 0  Interpretation: Score 0-3 (male): Negative screen for alcohol misuse    Single Item Drug Screening:  How often have you used an illegal drug (including marijuana) or a prescription medication for non-medical reasons in the past year? weekly    Single Item Drug Screen Score: 3  Interpretation: POSITIVE screen for possible drug use disorder    Drug Abuse Screening Test (DAST-10):  1) Have you used drugs other than those required for medical reasons? No  2) Do you abuse more than one drug at a time? No  3) Are you always able to stop using drugs when you want to? Yes  4) Have you had "blackouts" or "flashbacks" as a result of drug use? No  5) Do you ever feel bad or guilty about your drug use? No  6) Does your spouse (or parents) ever complain about your involvement with drugs? No  7) Have you neglected your family because of your use of drugs? No  8) Have you engaged in illegal activities in order to obtain drugs? No  9) Have you ever experienced withdrawal symptoms (felt sick) when you stopped taking drugs? No  10) Have you had medical problems as a result of your drug use (e g , memory loss, hepatitis, convulsions, bleeding, etc )? No    DAST-10 Score: 0  Interpretation: No problems reported    Brief Intervention  Alcohol & drug use screenings were reviewed  No concerns regarding substance use disorder identified  No exam data present     Physical Exam:     /70   Pulse 55   Temp 97 9 °F (36 6 °C)   Resp 16   Ht 5' 6" (1 676 m)   Wt 78 kg (172 lb)   SpO2 97%   BMI 27 76 kg/m²     Physical Exam  Constitutional:       Appearance: He is well-developed  HENT:      Head: Normocephalic and atraumatic  Right Ear: External ear normal       Left Ear: External ear normal       Nose: Nose normal    Eyes:      Conjunctiva/sclera: Conjunctivae normal       Pupils: Pupils are equal, round, and reactive to light  Neck:      Thyroid: No thyromegaly  Cardiovascular:      Rate and Rhythm: Normal rate and regular rhythm  Heart sounds: Normal heart sounds  Pulmonary:      Effort: Pulmonary effort is normal       Breath sounds: Normal breath sounds  No wheezing  Abdominal:      General: Bowel sounds are normal  There is no distension  Palpations: Abdomen is soft  There is no mass  Tenderness:  There is no abdominal tenderness  There is no guarding  Genitourinary:     Prostate: Normal    Musculoskeletal:         General: No tenderness  Normal range of motion  Cervical back: Normal range of motion and neck supple  Skin:     General: Skin is warm and dry  Capillary Refill: Capillary refill takes less than 2 seconds  Findings: No erythema  Neurological:      Mental Status: He is alert and oriented to person, place, and time  Psychiatric:         Behavior: Behavior normal          Thought Content:  Thought content normal          Judgment: Judgment normal           Lang Kelso, DO

## 2022-10-28 NOTE — PATIENT INSTRUCTIONS
Medicare Preventive Visit Patient Instructions  Thank you for completing your Welcome to Medicare Visit or Medicare Annual Wellness Visit today  Your next wellness visit will be due in one year (10/29/2023)  The screening/preventive services that you may require over the next 5-10 years are detailed below  Some tests may not apply to you based off risk factors and/or age  Screening tests ordered at today's visit but not completed yet may show as past due  Also, please note that scanned in results may not display below  Preventive Screenings:  Service Recommendations Previous Testing/Comments   Colorectal Cancer Screening  · Colonoscopy    · Fecal Occult Blood Test (FOBT)/Fecal Immunochemical Test (FIT)  · Fecal DNA/Cologuard Test  · Flexible Sigmoidoscopy Age: 39-70 years old   Colonoscopy: every 10 years (May be performed more frequently if at higher risk)  OR  FOBT/FIT: every 1 year  OR  Cologuard: every 3 years  OR  Sigmoidoscopy: every 5 years  Screening may be recommended earlier than age 39 if at higher risk for colorectal cancer  Also, an individualized decision between you and your healthcare provider will decide whether screening between the ages of 74-80 would be appropriate   Colonoscopy: 04/13/2022  FOBT/FIT: Not on file  Cologuard: Not on file  Sigmoidoscopy: Not on file    Screening Current     Prostate Cancer Screening Individualized decision between patient and health care provider in men between ages of 53-78   Medicare will cover every 12 months beginning on the day after your 50th birthday PSA: No results in last 5 years           Hepatitis C Screening Once for adults born between [birth year] and 1965  More frequently in patients at high risk for Hepatitis C Hep C Antibody: Not on file        Diabetes Screening 1-2 times per year if you're at risk for diabetes or have pre-diabetes Fasting glucose: 97 mg/dL (10/17/2022)  A1C: No results in last 5 years (No results in last 5 years)  Screening Current Cholesterol Screening Once every 5 years if you don't have a lipid disorder  May order more often based on risk factors  Lipid panel: 09/02/2021  Screening Not Indicated  History Lipid Disorder      Other Preventive Screenings Covered by Medicare:  1  Abdominal Aortic Aneurysm (AAA) Screening: covered once if your at risk  You're considered to be at risk if you have a family history of AAA or a male between the age of 73-68 who smoking at least 100 cigarettes in your lifetime  2  Lung Cancer Screening: covers low dose CT scan once per year if you meet all of the following conditions: (1) Age 50-69; (2) No signs or symptoms of lung cancer; (3) Current smoker or have quit smoking within the last 15 years; (4) You have a tobacco smoking history of at least 20 pack years (packs per day x number of years you smoked); (5) You get a written order from a healthcare provider  3  Glaucoma Screening: covered annually if you're considered high risk: (1) You have diabetes OR (2) Family history of glaucoma OR (3)  aged 48 and older OR (3)  American aged 72 and older  3  Osteoporosis Screening: covered every 2 years if you meet one of the following conditions: (1) Have a vertebral abnormality; (2) On glucocorticoid therapy for more than 3 months; (3) Have primary hyperparathyroidism; (4) On osteoporosis medications and need to assess response to drug therapy  5  HIV Screening: covered annually if you're between the age of 12-76  Also covered annually if you are younger than 13 and older than 72 with risk factors for HIV infection  For pregnant patients, it is covered up to 3 times per pregnancy      Immunizations:  Immunization Recommendations   Influenza Vaccine Annual influenza vaccination during flu season is recommended for all persons aged >= 6 months who do not have contraindications   Pneumococcal Vaccine   * Pneumococcal conjugate vaccine = PCV13 (Prevnar 13), PCV15 (Vaxneuvance), PCV20 (Prevnar 20)  * Pneumococcal polysaccharide vaccine = PPSV23 (Pneumovax) Adults 2364 years old: 1-3 doses may be recommended based on certain risk factors  Adults 72 years old: 1-2 doses may be recommended based off what pneumonia vaccine you previously received   Hepatitis B Vaccine 3 dose series if at intermediate or high risk (ex: diabetes, end stage renal disease, liver disease)   Tetanus (Td) Vaccine - COST NOT COVERED BY MEDICARE PART B Following completion of primary series, a booster dose should be given every 10 years to maintain immunity against tetanus  Td may also be given as tetanus wound prophylaxis  Tdap Vaccine - COST NOT COVERED BY MEDICARE PART B Recommended at least once for all adults  For pregnant patients, recommended with each pregnancy  Shingles Vaccine (Shingrix) - COST NOT COVERED BY MEDICARE PART B  2 shot series recommended in those aged 48 and above     Health Maintenance Due:      Topic Date Due   • Hepatitis C Screening  Never done   • Colorectal Cancer Screening  04/11/2029     Immunizations Due:      Topic Date Due   • Pneumococcal Vaccine: 65+ Years (1 - PCV) 09/08/1961   • COVID-19 Vaccine (4 - Booster for Nancy Flaco series) 03/18/2022     Advance Directives   What are advance directives? Advance directives are legal documents that state your wishes and plans for medical care  These plans are made ahead of time in case you lose your ability to make decisions for yourself  Advance directives can apply to any medical decision, such as the treatments you want, and if you want to donate organs  What are the types of advance directives? There are many types of advance directives, and each state has rules about how to use them  You may choose a combination of any of the following:  · Living will: This is a written record of the treatment you want  You can also choose which treatments you do not want, which to limit, and which to stop at a certain time   This includes surgery, medicine, IV fluid, and tube feedings  · Durable power of  for healthcare [facility]): This is a written record that states who you want to make healthcare choices for you when you are unable to make them for yourself  This person, called a proxy, is usually a family member or a friend  You may choose more than 1 proxy  · Do not resuscitate (DNR) order:  A DNR order is used in case your heart stops beating or you stop breathing  It is a request not to have certain forms of treatment, such as CPR  A DNR order may be included in other types of advance directives  · Medical directive: This covers the care that you want if you are in a coma, near death, or unable to make decisions for yourself  You can list the treatments you want for each condition  Treatment may include pain medicine, surgery, blood transfusions, dialysis, IV or tube feedings, and a ventilator (breathing machine)  · Values history: This document has questions about your views, beliefs, and how you feel and think about life  This information can help others choose the care that you would choose  Why are advance directives important? An advance directive helps you control your care  Although spoken wishes may be used, it is better to have your wishes written down  Spoken wishes can be misunderstood, or not followed  Treatments may be given even if you do not want them  An advance directive may make it easier for your family to make difficult choices about your care  Weight Management   Why it is important to manage your weight:  Being overweight increases your risk of health conditions such as heart disease, high blood pressure, type 2 diabetes, and certain types of cancer  It can also increase your risk for osteoarthritis, sleep apnea, and other respiratory problems  Aim for a slow, steady weight loss  Even a small amount of weight loss can lower your risk of health problems    How to lose weight safely:  A safe and healthy way to lose weight is to eat fewer calories and get regular exercise  You can lose up about 1 pound a week by decreasing the number of calories you eat by 500 calories each day  Healthy meal plan for weight management:  A healthy meal plan includes a variety of foods, contains fewer calories, and helps you stay healthy  A healthy meal plan includes the following:  · Eat whole-grain foods more often  A healthy meal plan should contain fiber  Fiber is the part of grains, fruits, and vegetables that is not broken down by your body  Whole-grain foods are healthy and provide extra fiber in your diet  Some examples of whole-grain foods are whole-wheat breads and pastas, oatmeal, brown rice, and bulgur  · Eat a variety of vegetables every day  Include dark, leafy greens such as spinach, kale, jabari greens, and mustard greens  Eat yellow and orange vegetables such as carrots, sweet potatoes, and winter squash  · Eat a variety of fruits every day  Choose fresh or canned fruit (canned in its own juice or light syrup) instead of juice  Fruit juice has very little or no fiber  · Eat low-fat dairy foods  Drink fat-free (skim) milk or 1% milk  Eat fat-free yogurt and low-fat cottage cheese  Try low-fat cheeses such as mozzarella and other reduced-fat cheeses  · Choose meat and other protein foods that are low in fat  Choose beans or other legumes such as split peas or lentils  Choose fish, skinless poultry (chicken or turkey), or lean cuts of red meat (beef or pork)  Before you cook meat or poultry, cut off any visible fat  · Use less fat and oil  Try baking foods instead of frying them  Add less fat, such as margarine, sour cream, regular salad dressing and mayonnaise to foods  Eat fewer high-fat foods  Some examples of high-fat foods include french fries, doughnuts, ice cream, and cakes  · Eat fewer sweets  Limit foods and drinks that are high in sugar  This includes candy, cookies, regular soda, and sweetened drinks    Exercise: Exercise at least 30 minutes per day on most days of the week  Some examples of exercise include walking, biking, dancing, and swimming  You can also fit in more physical activity by taking the stairs instead of the elevator or parking farther away from stores  Ask your healthcare provider about the best exercise plan for you  © Copyright [employer] 2018 Information is for End User's use only and may not be sold, redistributed or otherwise used for commercial purposes   All illustrations and images included in CareNotes® are the copyrighted property of [employer]  or [address] [place] [address]

## 2022-10-31 ENCOUNTER — LAB (OUTPATIENT)
Dept: LAB | Facility: CLINIC | Age: 67
End: 2022-10-31

## 2022-10-31 DIAGNOSIS — Z11.59 NEED FOR HEPATITIS C SCREENING TEST: ICD-10-CM

## 2022-10-31 DIAGNOSIS — Z13.6 SCREENING FOR CARDIOVASCULAR CONDITION: ICD-10-CM

## 2022-10-31 DIAGNOSIS — Z12.5 SCREENING FOR PROSTATE CANCER: ICD-10-CM

## 2022-10-31 LAB
CHOLEST SERPL-MCNC: 177 MG/DL
HCV AB SER QL: NORMAL
HDLC SERPL-MCNC: 80 MG/DL
LDLC SERPL CALC-MCNC: 86 MG/DL (ref 0–100)
PSA SERPL-MCNC: 0.7 NG/ML (ref 0–4)
TRIGL SERPL-MCNC: 55 MG/DL

## 2022-11-02 ENCOUNTER — HOSPITAL ENCOUNTER (OUTPATIENT)
Dept: RADIOLOGY | Facility: HOSPITAL | Age: 67
Discharge: HOME | End: 2022-11-02
Attending: FAMILY MEDICINE

## 2022-11-02 DIAGNOSIS — Z13.6 SCREENING FOR AAA (ABDOMINAL AORTIC ANEURYSM): ICD-10-CM

## 2022-11-02 DIAGNOSIS — Z87.891 FORMER SMOKER: ICD-10-CM

## 2022-11-11 ENCOUNTER — OFFICE VISIT (OUTPATIENT)
Dept: GASTROENTEROLOGY | Facility: CLINIC | Age: 67
End: 2022-11-11

## 2022-11-11 VITALS
HEIGHT: 66 IN | HEART RATE: 68 BPM | WEIGHT: 166 LBS | DIASTOLIC BLOOD PRESSURE: 88 MMHG | SYSTOLIC BLOOD PRESSURE: 147 MMHG | BODY MASS INDEX: 26.68 KG/M2

## 2022-11-11 DIAGNOSIS — K57.90 DIVERTICULAR DISEASE: ICD-10-CM

## 2022-11-11 DIAGNOSIS — K63.5 POLYP OF COLON, UNSPECIFIED PART OF COLON, UNSPECIFIED TYPE: ICD-10-CM

## 2022-11-11 DIAGNOSIS — K21.00 GASTROESOPHAGEAL REFLUX DISEASE WITH ESOPHAGITIS WITHOUT HEMORRHAGE: Primary | ICD-10-CM

## 2022-11-11 DIAGNOSIS — K64.0 GRADE I HEMORRHOIDS: ICD-10-CM

## 2022-11-11 NOTE — PROGRESS NOTES
[facility] - Outpatient Follow-up Note  Boneta Locus 79 y o  male MRN: [id number]  Encounter: [id number]          ASSESSMENT AND PLAN:      1  Gastroesophageal reflux disease with esophagitis without hemorrhage  Very well controlled on 20 mg of omeprazole  He will try every other day  Should he do well for a month he will then stop it use it as needed  He will continue conservative measures    2  Diverticular disease  Asymptomatic    3  Grade I hemorrhoids  Asymptomatic    4  Polyp of colon, unspecified part of colon, unspecified type  Next surveillance colonoscopy April of 2029  He will otherwise follow-up in 6 months    ______________________________________________________________________    SUBJECTIVE:  Very pleasant 80-year-old gentleman we see for gastroesophageal reflux  Had EGD and colonoscopy hiatal hernia no Calle's no H pylori  Has a history of colon polyps type unknown done elsewhere no polyps on most recent colonoscopy  Diverticulosis and internal hemorrhoids there were present but asymptomatic  He tells me in the meantime he ended up going into heart failure and now has a defibrillator and a pacemaker was diagnosed with sarcoidosis takes prednisone and Bactrim  Otherwise no particular complaints  REVIEW OF SYSTEMS IS OTHERWISE NEGATIVE        Historical Information   Past Medical History:   Diagnosis Date   • [facility] 36/67[facility]87   • Coronary artery disease    • Hyperlipemia    • Hypertension    • Irregular heart beat     bradycardia     Past Surgical History:   Procedure Laterality Date   • ANTERIOR CRUCIATE LIGAMENT REPAIR Left 2001   • CARDIAC LOOP RECORDER  03/2022    MD from [address]ante possible pacer placement   • CARDIAC SURGERY  2007    CABG x2     Social History   Social History     Substance and Sexual Activity   Alcohol Use Never     Social History     Substance and Sexual Activity   Drug Use Never     Social History Tobacco Use   Smoking Status Never Smoker   Smokeless Tobacco Never Used     Family History   Problem Relation Age of Onset   • Arthritis Mother    • Leukemia Maternal Aunt    • Mental illness Neg Hx        Meds/Allergies       Current Outpatient Medications:   •  amLODIPine (NORVASC) 10 mg tablet  •  aspirin (ECOTRIN LOW STRENGTH) 81 mg EC tablet  •  atorvastatin (LIPITOR) 80 mg tablet  •  buPROPion (WELLBUTRIN XL) 150 mg 24 hr tablet  •  cyclobenzaprine (FLEXERIL) 10 mg tablet  •  escitalopram (LEXAPRO) 20 mg tablet  •  folic acid (FOLVITE) 1 mg tablet  •  hydrochlorothiazide (HYDRODIURIL) 25 mg tablet  •  lidocaine (LIDODERM) 5 %  •  losartan (COZAAR) 50 mg tablet  •  melatonin 3 mg  •  metoprolol succinate (TOPROL-XL) 50 mg 24 hr tablet  •  montelukast (SINGULAIR) 10 mg tablet  •  omeprazole (PriLOSEC) 20 mg delayed release capsule  •  prazosin (MINIPRESS) 5 mg capsule  •  predniSONE 10 mg tablet  •  sulfamethoxazole-trimethoprim (BACTRIM) 400-80 mg per tablet  •  fluticasone (Flovent HFA) 110 MCG/ACT inhaler    Allergies   Allergen Reactions   • Bee Pollen Other (See Comments)     Itchy eyes/nose, sneezing, clogged ears, increased cough/chest tightness  • Moxifloxacin GI Intolerance     W/ GI bleed     • Pollen Extract Other (See Comments)     Itchy eyes/nose, sneezing, clogged ears, increased cough/chest tightness  • Lisinopril Cough           Objective     Blood pressure 147/88, pulse 68, height 5' 6" (1 676 m), weight 75 3 kg (166 lb)  Body mass index is 26 79 kg/m²  PHYSICAL EXAM:      General Appearance:   Alert, cooperative, no distress   HEENT:   Normocephalic, atraumatic, anicteric      Neck:  Supple, symmetrical, trachea midline   Lungs:   Clear to auscultation bilaterally; no rales, rhonchi or wheezing; respirations unlabored    Heart[de-identified]   Regular rate and rhythm; no murmur, rub, or gallop     Abdomen:   Soft, non-tender, non-distended; normal bowel sounds; no masses, no organomegaly  Genitalia:   Deferred    Rectal:   Deferred    Extremities:  No cyanosis, clubbing or edema    Pulses:  2+ and symmetric    Skin:  No jaundice, rashes, or lesions    Lymph nodes:  No palpable cervical lymphadenopathy        Lab Results:   No visits with results within 1 Day(s) from this visit  Latest known visit with results is:   Lab on 10/31/2022   Component Date Value   • Hepatitis C Ab 10/31/2022 Non-reactive    • Cholesterol 10/31/2022 177    • Triglycerides 10/31/2022 55    • HDL, Direct 10/31/2022 80    • LDL Calculated 10/31/2022 86    • PSA 10/31/2022 0 7          Radiology Results:   US abdominal aorta screening aaa    Result Date: 11/3/2022  Narrative: ABDOMINAL AORTIC ULTRASOUND INDICATION:   Z13 6: Encounter for screening for cardiovascular disorders Z87 [id number]: Personal history of nicotine dependence  COMPARISON: None FINDINGS: Ultrasound of the abdominal aorta was performed in longitudinal and transverse planes with a curvilinear transducer  Proximal aorta:  2 0 x 2 0 cm Mid aorta:    1 8 x 1 4 cm Distal aorta:    1 4 x 1 2 cm Right common iliac origin:  1 1 cm Left common iliac origin:  1 2 cm No periaortic collections or adenopathy detected  Impression: No evidence for abdominal aortic aneurysm   Workstation performed: [id number]

## 2022-12-15 ENCOUNTER — CLINICAL SUPPORT (OUTPATIENT)
Dept: FAMILY MEDICINE CLINIC | Facility: CLINIC | Age: 67
End: 2022-12-15

## 2022-12-15 DIAGNOSIS — Z11.52 ENCOUNTER FOR SCREENING FOR COVID-19: Primary | ICD-10-CM

## 2022-12-16 LAB — SARS-COV-2 RNA RESP QL NAA+PROBE: NEGATIVE

## 2022-12-22 ENCOUNTER — TELEPHONE (OUTPATIENT)
Dept: CARDIOLOGY CLINIC | Facility: CLINIC | Age: 67
End: 2022-12-22

## 2022-12-22 DIAGNOSIS — K21.00 GASTROESOPHAGEAL REFLUX DISEASE WITH ESOPHAGITIS WITHOUT HEMORRHAGE: ICD-10-CM

## 2022-12-22 DIAGNOSIS — K27.9 PEPTIC ULCER DISEASE: ICD-10-CM

## 2022-12-22 RX ORDER — OMEPRAZOLE 20 MG/1
CAPSULE, DELAYED RELEASE ORAL
Qty: 60 CAPSULE | Refills: 1 | Status: SHIPPED | OUTPATIENT
Start: 2022-12-22

## 2022-12-22 NOTE — TELEPHONE ENCOUNTER
Pt wife called to state she spoke with [facility] cardiology who manage pt defibulater notified them 3days ago swelling and weight gain of 8lbs  Pt was put on prednisone, they are conserned for inflammation around the heart and would like an echo ordered  Wife is asking if we can order it as they live here and would like to have it at [address]  pls advise, thank you

## 2022-12-22 NOTE — TELEPHONE ENCOUNTER
We can disregard the need to order the echo, we were able to view the order from [address] [place] [address] pt in scheduling it at [facility] for 12/29 at 8am

## 2022-12-29 ENCOUNTER — HOSPITAL ENCOUNTER (OUTPATIENT)
Dept: NON INVASIVE DIAGNOSTICS | Facility: HOSPITAL | Age: 67
Discharge: HOME | End: 2022-12-29

## 2022-12-29 VITALS
HEIGHT: 66 IN | WEIGHT: 166 LBS | SYSTOLIC BLOOD PRESSURE: 147 MMHG | DIASTOLIC BLOOD PRESSURE: 88 MMHG | BODY MASS INDEX: 26.68 KG/M2 | HEART RATE: 61 BPM

## 2022-12-29 DIAGNOSIS — D86.85 DILATED CARDIOMYOPATHY SECONDARY TO SARCOIDOSIS: ICD-10-CM

## 2022-12-29 LAB
AORTIC ROOT: 3 CM
AORTIC VALVE MEAN VELOCITY: 9.7 M/S
APICAL FOUR CHAMBER EJECTION FRACTION: 67 %
AV AREA BY CONTINUOUS VTI: 3 CM2
AV AREA PEAK VELOCITY: 3.2 CM2
AV LVOT MEAN GRADIENT: 4 MMHG
AV LVOT PEAK GRADIENT: 9 MMHG
AV MEAN GRADIENT: 4 MMHG
AV PEAK GRADIENT: 8 MMHG
AV VALVE AREA: 3 CM2
AV VELOCITY RATIO: 1.01
DOP CALC AO PEAK VEL: 1.45 M/S
DOP CALC AO VTI: 32.8 CM
DOP CALC LVOT AREA: 3.14 CM2
DOP CALC LVOT DIAMETER: 2 CM
DOP CALC LVOT PEAK VEL VTI: 31.35 CM
DOP CALC LVOT PEAK VEL: 1.46 M/S
DOP CALC LVOT STROKE INDEX: 51.9 ML/M2
DOP CALC LVOT STROKE VOLUME: 98.44
E WAVE DECELERATION TIME: 337 MS
FRACTIONAL SHORTENING: 30 (ref 28–44)
INTERVENTRICULAR SEPTUM IN DIASTOLE (PARASTERNAL SHORT AXIS VIEW): 1.3 CM
INTERVENTRICULAR SEPTUM: 1.3 CM (ref 0.6–1.1)
LAAS-AP2: 28.4 CM2
LAAS-AP4: 18.3 CM2
LEFT ATRIUM AREA SYSTOLE SINGLE PLANE A4C: 19.3 CM2
LEFT ATRIUM SIZE: 4 CM
LEFT INTERNAL DIMENSION IN SYSTOLE: 3.2 CM (ref 2.1–4)
LEFT VENTRICULAR INTERNAL DIMENSION IN DIASTOLE: 4.6 CM (ref 3.5–6)
LEFT VENTRICULAR POSTERIOR WALL IN END DIASTOLE: 1.1 CM
LEFT VENTRICULAR STROKE VOLUME: 60 ML
LVSV (TEICH): 60 ML
MV E'TISSUE VEL-SEP: 6 CM/S
MV PEAK A VEL: 0.72 M/S
MV PEAK E VEL: 47 CM/S
MV STENOSIS PRESSURE HALF TIME: 98 MS
MV VALVE AREA P 1/2 METHOD: 2.24
RIGHT ATRIUM AREA SYSTOLE A4C: 13.9 CM2
RIGHT VENTRICLE ID DIMENSION: 3.4 CM
SL CV LEFT ATRIUM LENGTH A2C: 6.3 CM
SL CV LV EF: 60
SL CV PED ECHO LEFT VENTRICLE DIASTOLIC VOLUME (MOD BIPLANE) 2D: 100 ML
SL CV PED ECHO LEFT VENTRICLE SYSTOLIC VOLUME (MOD BIPLANE) 2D: 40 ML
TR MAX PG: 24 MMHG
TR PEAK VELOCITY: 2.4 M/S
TRICUSPID VALVE PEAK REGURGITATION VELOCITY: 2.43 M/S

## 2023-04-03 ENCOUNTER — TELEPHONE (OUTPATIENT)
Dept: FAMILY MEDICINE CLINIC | Facility: CLINIC | Age: 68
End: 2023-04-03

## 2023-04-03 DIAGNOSIS — E78.2 MIXED HYPERLIPIDEMIA: ICD-10-CM

## 2023-04-03 DIAGNOSIS — I10 HYPERTENSION, UNSPECIFIED TYPE: Primary | ICD-10-CM

## 2023-04-03 DIAGNOSIS — I25.119 ATHEROSCLEROSIS OF NATIVE CORONARY ARTERY OF NATIVE HEART WITH ANGINA PECTORIS (HCC): ICD-10-CM

## 2023-04-03 NOTE — TELEPHONE ENCOUNTER
Hailey Constantino has a 6 month f/u end of April  Does he need bloodwork? If so, please put in chart    He goes to [address] [place] [address]

## 2023-04-24 ENCOUNTER — LAB (OUTPATIENT)
Dept: LAB | Facility: CLINIC | Age: 68
End: 2023-04-24

## 2023-04-24 DIAGNOSIS — E78.2 MIXED HYPERLIPIDEMIA: ICD-10-CM

## 2023-04-24 DIAGNOSIS — I10 HYPERTENSION, UNSPECIFIED TYPE: ICD-10-CM

## 2023-04-24 DIAGNOSIS — I25.119 ATHEROSCLEROSIS OF NATIVE CORONARY ARTERY OF NATIVE HEART WITH ANGINA PECTORIS (HCC): ICD-10-CM

## 2023-04-24 LAB
ERYTHROCYTE [DISTWIDTH] IN BLOOD BY AUTOMATED COUNT: 13.4 % (ref 11.6–15.1)
HCT VFR BLD AUTO: 40.4 % (ref 36.5–49.3)
HGB BLD-MCNC: 12.6 G/DL (ref 12–17)
MCH RBC QN AUTO: 26.5 PG (ref 26.8–34.3)
MCHC RBC AUTO-ENTMCNC: 31.2 G/DL (ref 31.4–37.4)
MCV RBC AUTO: 85 FL (ref 82–98)
PLATELET # BLD AUTO: 340 THOUSANDS/UL (ref 149–390)
PMV BLD AUTO: 10.6 FL (ref 8.9–12.7)
RBC # BLD AUTO: 4.75 MILLION/UL (ref 3.88–5.62)
WBC # BLD AUTO: 7.86 THOUSAND/UL (ref 4.31–10.16)

## 2023-04-25 ENCOUNTER — OFFICE VISIT (OUTPATIENT)
Dept: FAMILY MEDICINE CLINIC | Facility: CLINIC | Age: 68
End: 2023-04-25

## 2023-04-25 VITALS
SYSTOLIC BLOOD PRESSURE: 136 MMHG | HEART RATE: 69 BPM | BODY MASS INDEX: 27.29 KG/M2 | DIASTOLIC BLOOD PRESSURE: 70 MMHG | HEIGHT: 66 IN | WEIGHT: 169.8 LBS | RESPIRATION RATE: 18 BRPM | TEMPERATURE: 97.6 F

## 2023-04-25 DIAGNOSIS — G89.29 CHRONIC LOW BACK PAIN, UNSPECIFIED BACK PAIN LATERALITY, UNSPECIFIED WHETHER SCIATICA PRESENT: ICD-10-CM

## 2023-04-25 DIAGNOSIS — Z12.5 SCREENING FOR PROSTATE CANCER: ICD-10-CM

## 2023-04-25 DIAGNOSIS — R73.09 ABNORMAL GLUCOSE: ICD-10-CM

## 2023-04-25 DIAGNOSIS — F02.80 DEMENTIA IN OTHER DISEASES CLASSIFIED ELSEWHERE, UNSPECIFIED SEVERITY, WITHOUT BEHAVIORAL DISTURBANCE, PSYCHOTIC DISTURBANCE, MOOD DISTURBANCE, AND ANXIETY (HCC): ICD-10-CM

## 2023-04-25 DIAGNOSIS — F12.20 CANNABIS DEPENDENCE (HCC): ICD-10-CM

## 2023-04-25 DIAGNOSIS — E78.2 MIXED HYPERLIPIDEMIA: ICD-10-CM

## 2023-04-25 DIAGNOSIS — M54.50 ACUTE LEFT-SIDED LOW BACK PAIN, UNSPECIFIED WHETHER SCIATICA PRESENT: ICD-10-CM

## 2023-04-25 DIAGNOSIS — I10 HYPERTENSION, UNSPECIFIED TYPE: Primary | ICD-10-CM

## 2023-04-25 DIAGNOSIS — F33.1 MAJOR DEPRESSIVE DISORDER, RECURRENT, MODERATE (HCC): ICD-10-CM

## 2023-04-25 DIAGNOSIS — I50.31 ACUTE DIASTOLIC (CONGESTIVE) HEART FAILURE (HCC): ICD-10-CM

## 2023-04-25 DIAGNOSIS — I25.119 ATHEROSCLEROSIS OF NATIVE CORONARY ARTERY OF NATIVE HEART WITH ANGINA PECTORIS (HCC): ICD-10-CM

## 2023-04-25 DIAGNOSIS — R10.9 LEFT FLANK PAIN: ICD-10-CM

## 2023-04-25 DIAGNOSIS — M54.50 CHRONIC LOW BACK PAIN, UNSPECIFIED BACK PAIN LATERALITY, UNSPECIFIED WHETHER SCIATICA PRESENT: ICD-10-CM

## 2023-04-25 PROBLEM — Z87.891 FORMER SMOKER: Status: ACTIVE | Noted: 2019-06-13

## 2023-04-25 PROBLEM — H93.13 TINNITUS, BILATERAL: Status: ACTIVE | Noted: 2023-04-25

## 2023-04-25 PROBLEM — K21.9 GERD (GASTROESOPHAGEAL REFLUX DISEASE): Status: ACTIVE | Noted: 2019-05-06

## 2023-04-25 PROBLEM — D86.85 CARDIAC SARCOIDOSIS: Status: ACTIVE | Noted: 2022-10-28

## 2023-04-25 PROBLEM — Z95.810 PRESENCE OF COMBINATION INTERNAL CARDIAC DEFIBRILLATOR (ICD) AND PACEMAKER: Status: ACTIVE | Noted: 2022-06-23

## 2023-04-25 PROBLEM — F10.21 CHRONIC ALCOHOLISM IN REMISSION (HCC): Status: ACTIVE | Noted: 2023-04-25

## 2023-04-25 PROBLEM — T75.89XA ENVIRONMENTAL EXPOSURE: Status: ACTIVE | Noted: 2022-12-09

## 2023-04-25 LAB
ALBUMIN SERPL BCP-MCNC: 3.7 G/DL (ref 3.5–5)
ALP SERPL-CCNC: 82 U/L (ref 46–116)
ALT SERPL W P-5'-P-CCNC: 34 U/L (ref 12–78)
ANION GAP SERPL CALCULATED.3IONS-SCNC: 6 MMOL/L (ref 4–13)
AST SERPL W P-5'-P-CCNC: 20 U/L (ref 5–45)
BILIRUB SERPL-MCNC: 0.27 MG/DL (ref 0.2–1)
BUN SERPL-MCNC: 37 MG/DL (ref 5–25)
CALCIUM SERPL-MCNC: 10.2 MG/DL (ref 8.3–10.1)
CHLORIDE SERPL-SCNC: 112 MMOL/L (ref 96–108)
CHOLEST SERPL-MCNC: 152 MG/DL
CO2 SERPL-SCNC: 25 MMOL/L (ref 21–32)
CREAT SERPL-MCNC: 1.2 MG/DL (ref 0.6–1.3)
GFR SERPL CREATININE-BSD FRML MDRD: 62 ML/MIN/1.73SQ M
GLUCOSE P FAST SERPL-MCNC: 126 MG/DL (ref 65–99)
HDLC SERPL-MCNC: 39 MG/DL
LDLC SERPL CALC-MCNC: 88 MG/DL (ref 0–100)
POTASSIUM SERPL-SCNC: 5 MMOL/L (ref 3.5–5.3)
PROT SERPL-MCNC: 7.4 G/DL (ref 6.4–8.4)
SODIUM SERPL-SCNC: 143 MMOL/L (ref 135–147)
TRIGL SERPL-MCNC: 126 MG/DL

## 2023-04-25 RX ORDER — ECHINACEA PURPUREA EXTRACT 125 MG
1 TABLET ORAL 4 TIMES DAILY PRN
COMMUNITY
Start: 2022-12-09

## 2023-04-25 RX ORDER — LEVALBUTEROL TARTRATE 45 UG/1
AEROSOL, METERED ORAL EVERY 6 HOURS PRN
COMMUNITY
Start: 2022-12-09

## 2023-04-25 RX ORDER — FUROSEMIDE 20 MG/1
TABLET ORAL AS NEEDED
COMMUNITY
Start: 2023-03-06

## 2023-04-25 RX ORDER — FLUTICASONE PROPIONATE 50 MCG
SPRAY, SUSPENSION (ML) NASAL AS NEEDED
COMMUNITY
Start: 2023-03-23

## 2023-04-25 NOTE — PROGRESS NOTES
Assessment/Plan:    1  Hypertension, unspecified type  Assessment & Plan:  acceptable    Orders:  -     CBC; Future; Expected date: 10/07/2023    2  Atherosclerosis of native coronary artery of native heart with angina pectoris ([facility] )  -     CBC; Future; Expected date: 10/07/2023    3  Mixed hyperlipidemia  -     CBC; Future; Expected date: 10/07/2023  -     Comprehensive metabolic panel; Future; Expected date: 10/07/2023  -     Lipid Panel with Direct LDL reflex; Future; Expected date: 10/07/2023    4  Abnormal glucose  -     Hemoglobin A1C; Future  -     CBC; Future; Expected date: 10/07/2023    5  Dementia in other diseases classified elsewhere, unspecified severity, without behavioral disturbance, psychotic disturbance, mood disturbance, and anxiety (HCC)  -     CBC; Future; Expected date: 10/07/2023    6  Acute diastolic (congestive) heart failure (HCC)  -     CBC; Future; Expected date: 10/07/2023    7  Cannabis dependence ([facility] )  -     CBC; Future; Expected date: 10/07/2023    8  Major depressive disorder, recurrent, moderate (HCC)  -     CBC; Future; Expected date: 10/07/2023    9  Chronic low back pain, unspecified back pain laterality, unspecified whether sciatica present  -     Ambulatory Referral to Physical Therapy; Future  -     CBC; Future; Expected date: 10/07/2023    10  Screening for prostate cancer  -     CBC; Future; Expected date: 10/07/2023  -     PSA, Total Screen; Future    11  Acute left-sided low back pain, unspecified whether sciatica present  -     US kidney and bladder with pvr; Future; Expected date: 04/25/2023    12  Left flank pain  -     US kidney and bladder with pvr; Future; Expected date: 04/25/2023          Pt has dx from DOD of sarcoid - I carried it over to this chart  Pt advised I think his flank/back pain is musculo - will get an US to make suyre his kidney is ok - this is his concern  There are no Patient Instructions on file for this visit      Return for Recheck  Subjective:      Patient ID: Tfifany Fernández is a 79 y o  male  Chief Complaint   Patient presents with   • Follow-up     6 month f/u nm lpn       Pt is sched for a follow up today  Pt had labs done    Pt states yesterdauy or the day befroe he felt that the lower back on his left side below his last rib he felt like someone gave him an upper cut  States when he took a deep breath it hurt  States he put compression on it/ lido patch as well  Pt states he is about 80 percent better  Pt states he started working out - states he now has a defibrilastor  Asking if he could have an issue with his kidney  States he is liofting 5 lbs - he is afraid to go further  He is going to join the gym here  States he feels at times weak and exhausted and all that started after the [facility]  States he gets every day palps and a funny pain in the area - pt states he told cardio and he states they said to expect that      Pt also states he has pain in his lower back every day states the pain is unbearable  States this pain has been for rough 20 years  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 [employer]  Was sen by [facility] - was advised he had arthritis and states years of carrying his backpack could have complicated things    Pt states while he was in the [employer] he was exposed to burn pits - and states that is one of the causes of lung sarcoid but 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      The following portions of the patient's history were reviewed and updated as appropriate: allergies, current medications, past family history, past medical history, past social history, past surgical history and problem list     Review of Systems   Constitutional: Negative for activity change, appetite change, chills, diaphoresis, fatigue, fever and unexpected weight change  HENT: Negative for congestion, dental problem, ear pain, mouth sores, sinus pressure, sinus pain, sore throat and trouble swallowing      Eyes: Negative for photophobia, discharge and itching  Respiratory: Negative for apnea, chest tightness and shortness of breath  Cardiovascular: Negative for chest pain, palpitations and leg swelling  Gastrointestinal: Negative for abdominal distention, abdominal pain, blood in stool, nausea and vomiting  Endocrine: Negative for cold intolerance, heat intolerance, polydipsia, polyphagia and polyuria  Genitourinary: Negative for difficulty urinating  Musculoskeletal: Positive for arthralgias and back pain  Flank pain   Skin: Negative for color change and wound  Neurological: Negative for dizziness, syncope, speech difficulty and headaches  Hematological: Negative for adenopathy  Psychiatric/Behavioral: Negative for agitation and behavioral problems  Current Outpatient Medications   Medication Sig Dispense Refill   • amLODIPine (NORVASC) 10 mg tablet Take 10 mg by mouth every evening       • aspirin (ECOTRIN LOW STRENGTH) 81 mg EC tablet TAKE ONE TABLET BY MOUTH DAILY FOR THE HEART  • atorvastatin (LIPITOR) 80 mg tablet Take 80 mg by mouth in the morning     • buPROPion (WELLBUTRIN XL) 150 mg 24 hr tablet Take 150 mg by mouth       • cyclobenzaprine (FLEXERIL) 10 mg tablet Take 10 mg by mouth daily as needed       • escitalopram (LEXAPRO) 20 mg tablet Take 20 mg by mouth       • fluticasone (FLONASE) 50 mcg/act nasal spray as needed     • fluticasone (Flovent HFA) 110 MCG/ACT inhaler Inhale 2 puffs 2 (two) times a day Rinse mouth after use  12 g 0   • folic acid (FOLVITE) 1 mg tablet 1 mg     • furosemide (LASIX) 20 mg tablet if needed     • hydrochlorothiazide (HYDRODIURIL) 25 mg tablet Take 12 5 mg by mouth daily     • levalbuterol (XOPENEX HFA) 45 mcg/act inhaler Inhale every 6 (six) hours as needed     • lidocaine (LIDODERM) 5 % APPLY 1 PATCH ON SKIN DAILY FOR PAIN  DO NOT WEAR FOR LONGER THAN 12 HOURS IN ANY 24 HOUR PERIOD  [address] YOUR HANDS AFTER APPLYING A PATCH       • losartan "(COZAAR) 50 mg tablet Take 100 mg by mouth daily Patient is taking one pill per day of 50mg  • melatonin 3 mg Take 3 mg by mouth     • metoprolol succinate (TOPROL-XL) 50 mg 24 hr tablet Take 50 mg by mouth 2 (two) times a day     • montelukast (SINGULAIR) 10 mg tablet Take 10 mg by mouth     • omeprazole (PriLOSEC) 20 mg delayed release capsule take 1 capsule by mouth once daily 60 capsule 1   • prazosin (MINIPRESS) 5 mg capsule Take 5 mg by mouth daily at bedtime     • predniSONE 10 mg tablet Take 30 mg by mouth daily     • sodium chloride (OCEAN) 0 65 % nasal spray 1 spray into each nostril 4 (four) times a day as needed       No current facility-administered medications for this visit  Objective:    /70   Pulse 69   Temp 97 6 °F (36 4 °C)   Resp 18   Ht 5' 6\" (1 676 m)   Wt 77 kg (169 lb 12 8 oz)   BMI 27 41 kg/m²        Physical Exam  Vitals and nursing note reviewed  Constitutional:       General: He is not in acute distress  Appearance: He is well-developed  He is not diaphoretic  HENT:      Head: Normocephalic and atraumatic  Right Ear: External ear normal       Left Ear: External ear normal       Nose: Nose normal       Mouth/Throat:      Pharynx: No oropharyngeal exudate  Eyes:      General: No scleral icterus  Right eye: No discharge  Left eye: No discharge  Pupils: Pupils are equal, round, and reactive to light  Neck:      Thyroid: No thyromegaly  Cardiovascular:      Rate and Rhythm: Normal rate  Heart sounds: Normal heart sounds  No murmur heard  Pulmonary:      Effort: Pulmonary effort is normal  No respiratory distress  Breath sounds: Normal breath sounds  No wheezing  Abdominal:      General: Bowel sounds are normal  There is no distension  Palpations: Abdomen is soft  There is no mass  Tenderness: There is no abdominal tenderness  There is no guarding or rebound     Musculoskeletal:         General: Normal range of " motion  Comments: Tender in left flank at base of rib   Skin:     General: Skin is warm and dry  Findings: No erythema or rash  Neurological:      Mental Status: He is alert        Coordination: Coordination normal       Deep Tendon Reflexes: Reflexes normal    Psychiatric:         Behavior: Behavior normal               Recent Results (from the past 672 hour(s))   CBC    Collection Time: 04/24/23  9:26 AM   Result Value Ref Range    WBC 7 86 4 31 - 10 16 Thousand/uL    RBC 4 75 3 88 - 5 62 Million/uL    Hemoglobin 12 6 12 0 - 17 0 g/dL    Hematocrit 40 4 36 5 - 49 3 %    MCV 85 82 - 98 fL    MCH 26 5 (L) 26 8 - 34 3 pg    MCHC 31 2 (L) 31 4 - 37 4 g/dL    RDW 13 4 11 6 - 15 1 %    Platelets 670 920 - 348 Thousands/uL    MPV 10 6 8 9 - 12 7 fL   Comprehensive metabolic panel    Collection Time: 04/24/23  9:26 AM   Result Value Ref Range    Sodium 143 135 - 147 mmol/L    Potassium 5 0 3 5 - 5 3 mmol/L    Chloride 112 (H) 96 - 108 mmol/L    CO2 25 21 - 32 mmol/L    ANION GAP 6 4 - 13 mmol/L    BUN 37 (H) 5 - 25 mg/dL    Creatinine 1 20 0 60 - 1 30 mg/dL    Glucose, Fasting 126 (H) 65 - 99 mg/dL    Calcium 10 2 (H) 8 3 - 10 1 mg/dL    AST 20 5 - 45 U/L    ALT 34 12 - 78 U/L    Alkaline Phosphatase 82 46 - 116 U/L    Total Protein 7 4 6 4 - 8 4 g/dL    Albumin 3 7 3 5 - 5 0 g/dL    Total Bilirubin 0 27 0 20 - 1 00 mg/dL    eGFR 62 ml/min/1 73sq m   Lipid Panel with Direct LDL reflex    Collection Time: 04/24/23  9:26 AM   Result Value Ref Range    Cholesterol 152 See Comment mg/dL    Triglycerides 126 See Comment mg/dL    HDL, Direct 39 (L) >=40 mg/dL    LDL Calculated 88 0 - 100 mg/dL         Maria Luisa Hartman DO

## 2023-04-28 ENCOUNTER — LAB (OUTPATIENT)
Dept: LAB | Facility: CLINIC | Age: 68
End: 2023-04-28

## 2023-04-28 ENCOUNTER — OFFICE VISIT (OUTPATIENT)
Dept: CARDIOLOGY CLINIC | Facility: CLINIC | Age: 68
End: 2023-04-28

## 2023-04-28 VITALS
DIASTOLIC BLOOD PRESSURE: 60 MMHG | HEIGHT: 66 IN | OXYGEN SATURATION: 98 % | BODY MASS INDEX: 27 KG/M2 | WEIGHT: 168 LBS | HEART RATE: 70 BPM | SYSTOLIC BLOOD PRESSURE: 124 MMHG

## 2023-04-28 DIAGNOSIS — D86.85 CARDIAC SARCOIDOSIS: ICD-10-CM

## 2023-04-28 DIAGNOSIS — G45.9 TIA (TRANSIENT ISCHEMIC ATTACK): ICD-10-CM

## 2023-04-28 DIAGNOSIS — I50.31 ACUTE DIASTOLIC (CONGESTIVE) HEART FAILURE (HCC): ICD-10-CM

## 2023-04-28 DIAGNOSIS — I10 HYPERTENSION, UNSPECIFIED TYPE: ICD-10-CM

## 2023-04-28 DIAGNOSIS — F02.80 DEMENTIA IN OTHER DISEASES CLASSIFIED ELSEWHERE, UNSPECIFIED SEVERITY, WITHOUT BEHAVIORAL DISTURBANCE, PSYCHOTIC DISTURBANCE, MOOD DISTURBANCE, AND ANXIETY (HCC): ICD-10-CM

## 2023-04-28 DIAGNOSIS — E78.2 MIXED HYPERLIPIDEMIA: ICD-10-CM

## 2023-04-28 DIAGNOSIS — F12.20 CANNABIS DEPENDENCE (HCC): ICD-10-CM

## 2023-04-28 DIAGNOSIS — F33.1 MAJOR DEPRESSIVE DISORDER, RECURRENT, MODERATE (HCC): ICD-10-CM

## 2023-04-28 DIAGNOSIS — Z95.818 STATUS POST PLACEMENT OF IMPLANTABLE LOOP RECORDER: ICD-10-CM

## 2023-04-28 DIAGNOSIS — I10 RESISTANT HYPERTENSION: ICD-10-CM

## 2023-04-28 DIAGNOSIS — Z95.1 HISTORY OF CORONARY ARTERY BYPASS GRAFT: ICD-10-CM

## 2023-04-28 DIAGNOSIS — I25.10 CORONARY ARTERY DISEASE INVOLVING NATIVE HEART, UNSPECIFIED VESSEL OR LESION TYPE, UNSPECIFIED WHETHER ANGINA PRESENT: Primary | ICD-10-CM

## 2023-04-28 DIAGNOSIS — I25.119 ATHEROSCLEROSIS OF NATIVE CORONARY ARTERY OF NATIVE HEART WITH ANGINA PECTORIS (HCC): ICD-10-CM

## 2023-04-28 DIAGNOSIS — G89.29 CHRONIC LOW BACK PAIN, UNSPECIFIED BACK PAIN LATERALITY, UNSPECIFIED WHETHER SCIATICA PRESENT: ICD-10-CM

## 2023-04-28 DIAGNOSIS — M54.50 CHRONIC LOW BACK PAIN, UNSPECIFIED BACK PAIN LATERALITY, UNSPECIFIED WHETHER SCIATICA PRESENT: ICD-10-CM

## 2023-04-28 DIAGNOSIS — Z12.5 SCREENING FOR PROSTATE CANCER: ICD-10-CM

## 2023-04-28 DIAGNOSIS — R73.09 ABNORMAL GLUCOSE: ICD-10-CM

## 2023-04-28 PROBLEM — I1A.0 RESISTANT HYPERTENSION: Status: ACTIVE | Noted: 2023-04-28

## 2023-04-28 LAB — PSA SERPL-MCNC: 1.1 NG/ML (ref 0–4)

## 2023-04-28 RX ORDER — FLUTICASONE PROPIONATE 220 UG/1
AEROSOL, METERED RESPIRATORY (INHALATION)
COMMUNITY
Start: 2023-04-26

## 2023-04-28 RX ORDER — INHALER, ASSIST DEVICES
1 SPACER (EA) MISCELLANEOUS
COMMUNITY
Start: 2023-04-26

## 2023-04-28 RX ORDER — INHALER, ASSIST DEVICES
SPACER (EA) MISCELLANEOUS
COMMUNITY
Start: 2023-04-26

## 2023-04-28 NOTE — PROGRESS NOTES
[facility]  [address] [facility] [id number]   [place], [address]  Cardiology Consultation    Lydia Thompson  [id number]  1955      Consult for:CABG/Sarcoidosis  Appreciate consult by: Sherlyn Ford DO    1  Coronary artery disease involving native heart, unspecified vessel or lesion type, unspecified whether angina present  POCT ECG      2  Mixed hyperlipidemia  POCT ECG      3  Status post placement of implantable loop recorder  POCT ECG      4  History of coronary artery bypass graft  POCT ECG      5  TIA (transient ischemic attack)  POCT ECG      6  Cardiac sarcoidosis  POCT ECG      7  Resistant hypertension  POCT ECG         Discussion/Summary:   Palpitations/previous bradycardia/suspected Cardiac Sarcoidosis- reviewed patient's MRI and PET scan report  Echo 2D with normal ejection fraction and mild concentric hypertrophy  S/P ICD placement  He could not tolerate methotextrate  Reviewed advanced heart failure treatment  He is also on the bactrim + folic acid  Tapered off steroids    Coronary artery disease status post prior bypass grafting- his LDL is at goal   Last echo 2D with normal ejection fraction wall motion  Continue atorvastatin and aspirin  Hypertension- controlled on hydrochlorothiazide 12 5 mg and amlodipine 10 mg every evening  Discussed with patient by wearing compression when sitting standing    PTSD- currently on SSRI    HPI:   59-year-old disabled  history of coronary artery bypass grafting 2 vessel in 2007, hypertension with recent workup at an outside not work for episodes of tachy-holland with advanced imaging suggestive of a inflammatory and infiltrative process  He has plans for a defibrillator placement  He still has periodic tachycardia episodes  He denies having syncope  He states not being as active currently  Denies having any severe chest discomfort  There is no history of atrial fibrillation    He is compliant with his medications  He denies having any fevers or chills  Denies any recent viral symptoms  Denies having any nausea vomiting diarrhea  10/7: Denies dizziness or chest pain  Currently followed at [facility] advanced heart failure  He was not able to tolerate methotrexate  He is currently on prednisone  His pacemaker pocket is well healed  4/28/23:  Still with some shortness of breath  Denies having chest heaviness  Denies having any device firing  We discussed about his lipid panel  PMH  CABG- 2 vessel St  Vincent- severe stabbing pain    Social Hx  - 2005 [place]  Disabled [address] were in [facility]  Not much physical activity  Former smoking- 10 years-- 1-2 cig light- 2 years a pack    Family Hx  Sister- 79- couple of heart attack  No hx of pacemaker    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[employer]    Past Medical History:   Diagnosis Date   • Alcohol dependence, uncomplicated ([facility] ) 29/87/2561   • Coronary artery disease    • Hyperlipemia    • Hypertension    • Irregular heart beat     bradycardia     Social History     Socioeconomic History   • Marital status: /Civil Union     Spouse name: Not on file   • Number of children: Not on file   • Years of education: Not on file   • Highest education level: Not on file   Occupational History   • Not on file   Tobacco Use   • Smoking status: Never   • Smokeless tobacco: Never   Vaping Use   • Vaping Use: Never used   Substance and Sexual Activity   • Alcohol use: Never   • Drug use: Never   • Sexual activity: Not on file   Other Topics Concern   • Not on file   Social History Narrative   • Not on file     Social Determinants of Health     Financial Resource Strain: Low Risk    • Difficulty of Paying Living Expenses: Not hard at all   Food Insecurity: Not on file   Transportation Needs: No Transportation Needs   • Lack of Transportation (Medical): No   • Lack of Transportation (Non-Medical):  No   Physical Activity: Not on file   Stress: Not on file Social Connections: Not on file   Intimate Partner Violence: Not on file   Housing Stability: Not on file      Family History   Problem Relation Age of Onset   • Arthritis Mother    • Leukemia Maternal Aunt    • Mental illness Neg Hx      Past Surgical History:   Procedure Laterality Date   • ANTERIOR CRUCIATE LIGAMENT REPAIR Left 2001   • CARDIAC LOOP RECORDER  03/2022    MD from [address] possible pacer placement   • CARDIAC SURGERY  2007    CABG x2       Current Outpatient Medications:   •  amLODIPine (NORVASC) 10 mg tablet, Take 10 mg by mouth every evening  , Disp: , Rfl:   •  aspirin (ECOTRIN LOW STRENGTH) 81 mg EC tablet, TAKE ONE TABLET BY MOUTH DAILY FOR THE HEART , Disp: , Rfl:   •  atorvastatin (LIPITOR) 80 mg tablet, Take 80 mg by mouth in the morning, Disp: , Rfl:   •  buPROPion (WELLBUTRIN XL) 150 mg 24 hr tablet, Take 150 mg by mouth  , Disp: , Rfl:   •  cyclobenzaprine (FLEXERIL) 10 mg tablet, Take 10 mg by mouth daily as needed  , Disp: , Rfl:   •  escitalopram (LEXAPRO) 20 mg tablet, Take 20 mg by mouth  , Disp: , Rfl:   •  Flovent  MCG/ACT inhaler, , Disp: , Rfl:   •  fluticasone (FLONASE) 50 mcg/act nasal spray, as needed, Disp: , Rfl:   •  folic acid (FOLVITE) 1 mg tablet, 1 mg, Disp: , Rfl:   •  furosemide (LASIX) 20 mg tablet, if needed, Disp: , Rfl:   •  hydrochlorothiazide (HYDRODIURIL) 25 mg tablet, Take 12 5 mg by mouth daily, Disp: , Rfl:   •  levalbuterol (XOPENEX HFA) 45 mcg/act inhaler, Inhale every 6 (six) hours as needed, Disp: , Rfl:   •  lidocaine (LIDODERM) 5 %, APPLY 1 PATCH ON SKIN DAILY FOR PAIN  DO NOT WEAR FOR LONGER THAN 12 HOURS IN ANY 24 HOUR PERIOD   [address] YOUR HANDS AFTER APPLYING A PATCH , Disp: , Rfl:   •  losartan (COZAAR) 50 mg tablet, Take 100 mg by mouth daily Patient is taking one pill per day of 50mg , Disp: , Rfl:   •  melatonin 3 mg, Take 3 mg by mouth, Disp: , Rfl:   •  metoprolol succinate (TOPROL-XL) 50 mg 24 hr tablet, Take 50 mg by mouth 2 (two) times a "day, Disp: , Rfl:   •  montelukast (SINGULAIR) 10 mg tablet, Take 10 mg by mouth, Disp: , Rfl:   •  omeprazole (PriLOSEC) 20 mg delayed release capsule, take 1 capsule by mouth once daily, Disp: 60 capsule, Rfl: 1  •  prazosin (MINIPRESS) 5 mg capsule, Take 5 mg by mouth daily at bedtime, Disp: , Rfl:   •  sodium chloride (OCEAN) 0 65 % nasal spray, 1 spray into each nostril 4 (four) times a day as needed, Disp: , Rfl:   •  Spacer/Aero-Holding Chambers ([employer] Spacer Adult) MISC, Use 1 each, Disp: , Rfl:   •  Spacer/Aero-Holding Chambers ([employer]) MISC, , Disp: , Rfl:   •  fluticasone (Flovent HFA) 110 MCG/ACT inhaler, Inhale 2 puffs 2 (two) times a day Rinse mouth after use , Disp: 12 g, Rfl: 0  •  predniSONE 10 mg tablet, Take 30 mg by mouth daily (Patient not taking: Reported on 4/28/2023), Disp: , Rfl:   Allergies   Allergen Reactions   • Bee Pollen Other (See Comments)     Itchy eyes/nose, sneezing, clogged ears, increased cough/chest tightness  • Methotrexate Other (See Comments)     Felt unwell, unsteady   • Moxifloxacin GI Intolerance     W/ GI bleed     • Pollen Extract Other (See Comments)     Itchy eyes/nose, sneezing, clogged ears, increased cough/chest tightness  • Lisinopril Cough     Vitals:    04/28/23 1021 04/28/23 1029 04/28/23 1031   BP: 130/70 130/62 124/60   BP Location: Right arm Right arm Right arm   Patient Position: Supine Sitting Standing   Cuff Size: Standard Standard Standard   Pulse: 66 70 70   SpO2: 98%     Weight: 76 2 kg (168 lb)     Height: 5' 6\" (1 676 m)         Review of Systems:   Review of Systems   Respiratory: Negative for chest tightness  Cardiovascular: Positive for palpitations         Vitals:    04/28/23 1021 04/28/23 1029 04/28/23 1031   BP: 130/70 130/62 124/60   BP Location: Right arm Right arm Right arm   Patient Position: Supine Sitting Standing   Cuff Size: Standard Standard Standard   Pulse: 66 70 70   SpO2: 98%     Weight: 76 2 kg (168 " "lb)     Height: 5' 6\" (1 676 m)       Physical Examination:   Physical Exam  Constitutional:       General: He is not in acute distress  Appearance: He is well-developed  He is not diaphoretic  HENT:      Head: Normocephalic and atraumatic  Right Ear: External ear normal       Left Ear: External ear normal    Eyes:      General: No scleral icterus  Right eye: No discharge  Left eye: No discharge  Conjunctiva/sclera: Conjunctivae normal       Pupils: Pupils are equal, round, and reactive to light  Neck:      Thyroid: No thyromegaly  Vascular: No JVD  Trachea: No tracheal deviation  Cardiovascular:      Rate and Rhythm: Normal rate and regular rhythm  Heart sounds: Murmur heard  No friction rub  Gallop present  Comments: ICD well-healed  Pulmonary:      Effort: Pulmonary effort is normal  No respiratory distress  Breath sounds: Normal breath sounds  No stridor  No wheezing or rales  Chest:      Chest wall: No tenderness  Abdominal:      General: Bowel sounds are normal  There is no distension  Palpations: Abdomen is soft  There is no mass  Tenderness: There is no abdominal tenderness  There is no guarding or rebound  Musculoskeletal:         General: No tenderness or deformity  Normal range of motion  Cervical back: Normal range of motion and neck supple  Skin:     General: Skin is warm and dry  Coloration: Skin is not pale  Findings: No erythema or rash  Neurological:      Mental Status: He is alert and oriented to person, place, and time  Cranial Nerves: No cranial nerve deficit  Motor: No abnormal muscle tone  Coordination: Coordination normal       Deep Tendon Reflexes: Reflexes are normal and symmetric  Reflexes normal    Psychiatric:         Behavior: Behavior normal          Thought Content:  Thought content normal          Judgment: Judgment normal          Labs:     Lab Results   Component Value " Date    WBC 7 86 04/24/2023    HGB 12 6 04/24/2023    HCT 40 4 04/24/2023    MCV 85 04/24/2023    RDW 13 4 04/24/2023     04/24/2023     BMP:  Lab Results   Component Value Date    SODIUM 143 04/24/2023    K 5 0 04/24/2023     (H) 04/24/2023    CO2 25 04/24/2023    BUN 37 (H) 04/24/2023    CREATININE 1 20 04/24/2023    GLUC 106 09/26/2022    GLUF 126 (H) 04/24/2023    CALCIUM 10 2 (H) 04/24/2023    CORRECTEDCA 9 9 10/17/2022    EGFR 62 04/24/2023     LFT:  Lab Results   Component Value Date    AST 20 04/24/2023    ALT 34 04/24/2023    ALKPHOS 82 04/24/2023    TP 7 4 04/24/2023    ALB 3 7 04/24/2023      Lipid Profile:   Lab Results   Component Value Date    CHOLESTEROL 152 04/24/2023    HDL 39 (L) 04/24/2023    LDLCALC 88 04/24/2023    TRIG 126 04/24/2023     Lab Results   Component Value Date    CHOLESTEROL 152 04/24/2023    CHOLESTEROL 177 10/31/2022       Imaging & Testing   I have personally reviewed pertinent reports  Cardiac Testing   Sarcoidosis:   1   Solitary, small area of mildly increased FDG uptake in basal lateral wall suggesting active inflammation   The other sites that showed late gadolinium enhancement that are without abnormal FDG activity are likely fibrotic  2   Small subpleural nodule associated with right major fissure, too small for metabolic evaluation   Nonspecific   Continued dedicated CT followup in 3-6 months recommended, as clinically indicated  3   No additional FDG avid tissue  Cardiac MRI:  1  The LV is normal in size with borderline systolic function; LVEF 95%  2  RV is mildly dilated (RVEDVI of 91 ml/sq m) with mildly diminished systolic function (RVEF of 37%)  3  Patchy areas of LGE at the basal septum at the superior and inferior LV - RV junction and mid LV inferior LV - RV junction, that could represent nonspecific myocardial fibrosis     4  2 8 x 2 2 cm enhancing lesion in the liver that is incompletely evaluated on this cardiac focussed "examination  Recommend dedicated liver MRI for further evaluation  CT Chest 2020: IMPRESSION:   1   Mild widespread bronchiectasis  2   No significant bronchial wall thickening or air trapping  3   Nonspecific pulmonary nodule measuring up to 5 x 4 mm  4   Nonspecific hypodense right hepatic lobe 3 cm lesion  1  No evidence of myocardial inflammation/sarcoidosis  2  Stable right upper lobe perifissural nodule, below resolution of PET  3  New asymmetric activity at the left adrenal gland, correlate to MRI  4  New FDG-avid focus at the anterior aspect of the left testis, correlate to ultrasound  5  New mild diffuse activity throughout the thyroid gland, probably related to thyroiditis  6  Stable mildly FDG-avid bilateral hilar foci, stable mild FDG-avidity at the gastroesophageal junction, stable mild FDG-avidity along the median sternotomy, probably inflammatory  7  Stable heterogeneous activity throughout the osseous structures including stable mildly FDG-avid focus in the proximal right femur without CT correlate, possibly  EKG: Personally reviewed  Normal sinus rhythm bifascicular heart block no acute ST changes      Leni Wells  [phone]  Please call with any questions or suggestions    Counseling :  A description of the counselin minutes spent discussing about her sarcoidosis workup  Reviewing prior records with patient and family  Goals and Barriers:  Patient's ability to self care:  Medication side effect reviewed with patient in detail and all their questions answered  \"Portions of the record may have been created with voice recognition software  Occasional wrong word or \"sound a like\" substitutions may have occurred due to the inherent limitations of voice recognition software  Read the chart carefully and recognize, using context, where substitutions have occurred  Please call if you have any questions   \"    "

## 2023-04-28 NOTE — LETTER
April 28, 2023     Patient: Elif Perez  YOB: 1955  Date of Visit: 4/28/2023      To Whom it May Concern:    Jose Lal is under my professional care  Tomas Rausch was seen in my office on 4/28/2023  Tomas Rausch may exercise at the gym without any limitations    If you have any questions or concerns, please don't hesitate to call           Sincerely,          Twila Chacon MD        CC: No Recipients

## 2023-05-01 ENCOUNTER — HOSPITAL ENCOUNTER (OUTPATIENT)
Dept: RADIOLOGY | Facility: HOSPITAL | Age: 68
Discharge: HOME | End: 2023-05-01
Attending: FAMILY MEDICINE

## 2023-05-01 DIAGNOSIS — M54.50 ACUTE LEFT-SIDED LOW BACK PAIN, UNSPECIFIED WHETHER SCIATICA PRESENT: ICD-10-CM

## 2023-05-01 DIAGNOSIS — R10.9 LEFT FLANK PAIN: ICD-10-CM

## 2023-05-02 LAB
EST. AVERAGE GLUCOSE BLD GHB EST-MCNC: 120 MG/DL
HBA1C MFR BLD: 5.8 %

## 2023-05-02 NOTE — RESULT ENCOUNTER NOTE
A1c is slightly elevated would suggest limiting carbs  PSA is within normal limits should recheck in a year

## 2023-05-03 ENCOUNTER — TELEPHONE (OUTPATIENT)
Dept: FAMILY MEDICINE CLINIC | Facility: CLINIC | Age: 68
End: 2023-05-03

## 2023-05-03 NOTE — TELEPHONE ENCOUNTER
Called pt discussed the resuylts of the US  Kidneys and the results were acceptable  Did show some enlrgement of prostate   The psa overall was acceptable and pt denies any LUTS

## 2023-05-05 ENCOUNTER — EVALUATION (OUTPATIENT)
Dept: PHYSICAL THERAPY | Facility: CLINIC | Age: 68
End: 2023-05-05

## 2023-05-05 DIAGNOSIS — G89.29 CHRONIC LOW BACK PAIN, UNSPECIFIED BACK PAIN LATERALITY, UNSPECIFIED WHETHER SCIATICA PRESENT: Primary | ICD-10-CM

## 2023-05-05 DIAGNOSIS — M54.50 CHRONIC LOW BACK PAIN, UNSPECIFIED BACK PAIN LATERALITY, UNSPECIFIED WHETHER SCIATICA PRESENT: Primary | ICD-10-CM
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
## 2023-05-09 ENCOUNTER — OFFICE VISIT (OUTPATIENT)
Dept: PHYSICAL THERAPY | Facility: CLINIC | Age: 68
End: 2023-05-09

## 2023-05-09 DIAGNOSIS — M54.50 CHRONIC LOW BACK PAIN, UNSPECIFIED BACK PAIN LATERALITY, UNSPECIFIED WHETHER SCIATICA PRESENT: Primary | ICD-10-CM

## 2023-05-09 DIAGNOSIS — G89.29 CHRONIC LOW BACK PAIN, UNSPECIFIED BACK PAIN LATERALITY, UNSPECIFIED WHETHER SCIATICA PRESENT: Primary | ICD-10-CM

## 2023-05-09 NOTE — PROGRESS NOTES
Daily Note     Today's date: 2023  Patient name: Kalin Alan  : 1955  MRN: [id number]  Referring provider: Louann Cruz DO  Dx:   Encounter Diagnosis     ICD-10-CM    1  Chronic low back pain, unspecified back pain laterality, unspecified whether sciatica present  M54 50     G89 29                      Subjective: Pt reports 4/10 pain to start session  Objective: deferred press ups and prone on elbows due to increase in foot tingling during progressions - stopped and improved w/ flexion based work today  Assessment: Tolerated treatment well  Patient demonstrated fatigue post treatment and would benefit from continued PT  Does well w/ exercise progressions today, switched to more of a flexion based program today due to increase in sx w/ extension based work  Good tolerance throughout  Plan: Continue per plan of care  increase intensity of strength work in [employer] positions as able           POC expires Auth Status Total   Visits  Start date  Expiration date PT/OT + Visit Limit?  Co-Insurance    MEDICARE     No                                             Dummy Auth Tracking  1 2 3 4 5 6   7 8 9 10 11 12   13 14 15 16 17 18   19 20 21 22 23 24   25 26 27 28 29 30   31 32 33 34 35 36         Precautions: hx of CABG x2 in , defibrillator and pacemaker implanted in , hx of open heart surgery, PTSD, hx of depression and anxiety         Date (Visit #) IE  (1)   (2) (3)  (4)  (5)   Manual              DTM and TPR             Lumbar mobs  intro           Lumbar traction   x2 min hold B                                         Exercise Diary              Ther Ex        Active w/u             HS/glute/piri/HF stretching  tested HS and glute/piri   B HS and glute/piri x8 mins, intermittent traction x 2 mins         Mobility (LTR, open books, cat/cow) 2x10 2x10-15       Rows/ext        Hip stability         GEGE x2 mins x2 mins only       PPU x8 Deferred after 8 Neuro Re Ed        TrA progressions   isos, march, single leg ext x10-15 each  TrA isos x10, single leg ext x10-15     90/90 taps 2x10         Bridge progressions  x10 total 2x10 w/ ad sq         Squat progressions    TRX x10-15          Hip abd progressions    green tband rows and ext 2x10           pallof 2x10 reg, overhead chop 2x10               HEP and POC review x8 mins w/ pain discussion Rev and update x 2-3 mins                              Ther Act             Stairs             Functional Transfers             Functional Lifting                                                                                                                                                           Modalities             heat             Ice              Mechanical Traction                Access Code: [email or web address]  URL: [email or web address]  Date: 05/05/2023  Prepared by: Jann Barber    Exercises  - Supine Transversus Abdominis Bracing - Hands on Stomach  - 1 x daily - 7 x weekly - 3 sets - 10 reps  - Supine March  - 1 x daily - 7 x weekly - 3 sets - 10 reps  - Supine Bridge  - 1 x daily - 7 x weekly - 3 sets - 10 reps  - Prone Press Up On Elbows  - 1 x daily - 7 x weekly - 3 sets - 10 reps  - Prone Press Up  - 1 x daily - 7 x weekly - 3 sets - 10 reps

## 2023-05-11 ENCOUNTER — OFFICE VISIT (OUTPATIENT)
Dept: PHYSICAL THERAPY | Facility: CLINIC | Age: 68
End: 2023-05-11

## 2023-05-11 DIAGNOSIS — G89.29 CHRONIC LOW BACK PAIN, UNSPECIFIED BACK PAIN LATERALITY, UNSPECIFIED WHETHER SCIATICA PRESENT: Primary | ICD-10-CM

## 2023-05-11 DIAGNOSIS — M54.50 CHRONIC LOW BACK PAIN, UNSPECIFIED BACK PAIN LATERALITY, UNSPECIFIED WHETHER SCIATICA PRESENT: Primary | ICD-10-CM

## 2023-05-11 NOTE — PROGRESS NOTES
Daily Note     Today's date: 2023  Patient name: Joel Forrester  : 1955  MRN: [id number]  Referring provider: Lynn Mcneill DO  Dx:   Encounter Diagnosis     ICD-10-CM    1  Chronic low back pain, unspecified back pain laterality, unspecified whether sciatica present  M54 50     G89 29                      Subjective: Pt reports soreness at 3/10 to start session  Notes that he felt good after last visit but had more pain when he got home  Notes that he walked earlier today, could walk for 15 mins before onset of low back pain  Objective: no pain noted w/ upright exercises today, requires min cueing for proper pacing but corrects and is able to maintain  Assessment: Tolerated treatment well  Patient demonstrated fatigue post treatment and would benefit from continued PT  Does well w/ exercise progressions, fatigue but no increase in pain by end of session  Slightly reduced total intensity today, focused on flexion based work throughout  Shows good tolerance and has no significant pain to end session  Will attempt to increase intensity as sx allow  Plan: Continue per plan of care  carries, hip hinge, waldemar curl      POC expires Auth Status Total   Visits  Start date  Expiration date PT/OT + Visit Limit?  Co-Insurance    MEDICARE     No                                             Dummy Auth Tracking  1 2 3 4 5 6   7 8 9 10 11 12   13 14 15 16 17 18   19 20 21 22 23 24   25 26 27 28 29 30   31 32 33 34 35 36         Precautions: hx of CABG x2 in , defibrillator and pacemaker implanted in , hx of open heart surgery, PTSD, hx of depression and anxiety         Date (Visit #) IE  (1)   (2)  (3)  (4)  (5)   Manual              DTM and TPR             Lumbar mobs  intro           Lumbar traction   x2 min hold B                                         Exercise Diary              Ther Ex        Active w/u             HS/glute/piri/HF stretching  tested HS and glute/piri B HS and glute/piri x8 mins, intermittent traction x 2 mins B HS and glute/piri w/ intermittent LAD x 8 mins        Mobility (LTR, open books, cat/cow) 2x10 2x10-15  2x10     Rows/ext        Hip stability         GEGE x2 mins x2 mins only       PPU x8 Deferred after 8 no        DKTC 2x10  SKTC x10                     Neuro Re Ed        TrA progressions   isos, march, single leg ext x10-15 each  TrA isos x10, single leg ext x10-15     90/90 taps 2x10 Isos, march 2x10    Yellow ball iso press w/ march 2x10, w/ single leg ext 2x10       Bridge progressions  x10 total 2x10 w/ ad sq  no       Squat progressions    TRX x10-15   no       Hip abd progressions    green tband rows and ext 2x10 Blue tband sh ext iso w/ march 2x10         pallof 2x10 reg, overhead chop 2x10 Blue tband pallof 2x10 B              HEP and POC review x8 mins w/ pain discussion Rev and update x 2-3 mins Rev and update x2-3 mins                             Ther Act             Stairs             Functional Transfers             Functional Lifting                                                                                                                                                           Modalities             heat             Ice              Mechanical Traction                Access Code: [email or web address]  URL: [email or web address]  Date: 05/05/2023  Prepared by: Rob Thurston    Exercises  - Supine Transversus Abdominis Bracing - Hands on Stomach  - 1 x daily - 7 x weekly - 3 sets - 10 reps  - Supine March  - 1 x daily - 7 x weekly - 3 sets - 10 reps  - Supine Bridge  - 1 x daily - 7 x weekly - 3 sets - 10 reps  - Prone Press Up On Elbows  - 1 x daily - 7 x weekly - 3 sets - 10 reps  - Prone Press Up  - 1 x daily - 7 x weekly - 3 sets - 10 reps

## 2023-05-15 ENCOUNTER — OFFICE VISIT (OUTPATIENT)
Dept: PHYSICAL THERAPY | Facility: CLINIC | Age: 68
End: 2023-05-15

## 2023-05-15 DIAGNOSIS — G89.29 CHRONIC LOW BACK PAIN, UNSPECIFIED BACK PAIN LATERALITY, UNSPECIFIED WHETHER SCIATICA PRESENT: Primary | ICD-10-CM

## 2023-05-15 DIAGNOSIS — M54.50 CHRONIC LOW BACK PAIN, UNSPECIFIED BACK PAIN LATERALITY, UNSPECIFIED WHETHER SCIATICA PRESENT: Primary | ICD-10-CM

## 2023-05-15 NOTE — PROGRESS NOTES
Daily Note     Today's date: 5/15/2023  Patient name: Roshan Diana  : 1955  MRN: [id number]  Referring provider: Valerie Benitez DO  Dx:   Encounter Diagnosis     ICD-10-CM    1  Chronic low back pain, unspecified back pain laterality, unspecified whether sciatica present  M54 50     G89 29                      Subjective: Pt reports less soreness after last session as compared to the first follow up  Notes 3/10 pain to start session today  Objective: requires cueing for hip hinging to perform w/ proper glute drive - corrects w/ band and is able to maintain  Assessment: Tolerated treatment well  Patient demonstrated fatigue post treatment and would benefit from continued PT  Does well w/ exercise progressions today  Mild soreness but no increase in pain by end of session  Shows good pacing throughout exercise program and will benefit from increased intensity as sx allow  Increase intensity if able  Plan: Continue per plan of care  carries, hip hinging progressions      POC expires Auth Status Total   Visits  Start date  Expiration date PT/OT + Visit Limit?  Co-Insurance    MEDICARE     No                                             Dummy Auth Tracking  1 2 3 4 5 6   7 8 9 10 11 12   13 14 15 16 17 18   19 20 21 22 23 24   25 26 27 28 29 30   31 32 33 34 35 36         Precautions: hx of CABG x2 in , defibrillator and pacemaker implanted in , hx of open heart surgery, PTSD, hx of depression and anxiety         Date (Visit #) IE  (1)   (2)  (3)  5/15 (4)  (5)   Manual              DTM and TPR             Lumbar mobs  intro           Lumbar traction   x2 min hold B                                         Exercise Diary              Ther Ex        Active w/u             HS/glute/piri/HF stretching  tested HS and glute/piri   B HS and glute/piri x8 mins, intermittent traction x 2 mins B HS and glute/piri w/ intermittent LAD x 8 mins   same x 10 min total     Mobility (LTR, open books, cat/cow) 2x10 2x10-15  2x10 2x10     Rows/ext        Hip stability         GEGE x2 mins x2 mins only       PPU x8 Deferred after 8 no no       DKTC 2x10  SKTC x10 SKTC 2x10                     Neuro Re Ed        TrA progressions   isos, march, single leg ext x10-15 each  TrA isos x10, single leg ext x10-15     90/90 taps 2x10 Isos, march 2x10    Yellow ball iso press w/ march 2x10, w/ single leg ext 2x10  isos, march, single leg ext 2x15 each      Bridge progressions  x10 total 2x10 w/ ad sq  no  2x10 w/ ad sq     Squat progressions    TRX x10-15   no       Hip abd progressions    green tband rows and ext 2x10 Blue tband sh ext iso w/ march 2x10  2x15       pallof 2x10 reg, overhead chop 2x10 Blue tband pallof 2x10 B 2x15              HEP and POC review x8 mins w/ pain discussion Rev and update x 2-3 mins Rev and update x2-3 mins Rev x 2-3 mins         Black tband hip hinge x20        18# KB hinge x10, 26# 2x10             Ther Act             Stairs             Functional Transfers             Functional Lifting                                                                                                                                                           Modalities             heat             Ice              Mechanical Traction                Access Code: [email or web address]  URL: [email or web address]  Date: 05/05/2023  Prepared by: Poncho Gomez    Exercises  - Supine Transversus Abdominis Bracing - Hands on Stomach  - 1 x daily - 7 x weekly - 3 sets - 10 reps  - Supine March  - 1 x daily - 7 x weekly - 3 sets - 10 reps  - Supine Bridge  - 1 x daily - 7 x weekly - 3 sets - 10 reps  - Prone Press Up On Elbows  - 1 x daily - 7 x weekly - 3 sets - 10 reps  - Prone Press Up  - 1 x daily - 7 x weekly - 3 sets - 10 reps

## 2023-05-17 ENCOUNTER — APPOINTMENT (OUTPATIENT)
Dept: PHYSICAL THERAPY | Facility: CLINIC | Age: 68
End: 2023-05-17
Payer: MEDICARE

## 2023-05-19 ENCOUNTER — OFFICE VISIT (OUTPATIENT)
Dept: PHYSICAL THERAPY | Facility: CLINIC | Age: 68
End: 2023-05-19

## 2023-05-19 DIAGNOSIS — G89.29 CHRONIC LOW BACK PAIN, UNSPECIFIED BACK PAIN LATERALITY, UNSPECIFIED WHETHER SCIATICA PRESENT: Primary | ICD-10-CM

## 2023-05-19 DIAGNOSIS — M54.50 CHRONIC LOW BACK PAIN, UNSPECIFIED BACK PAIN LATERALITY, UNSPECIFIED WHETHER SCIATICA PRESENT: Primary | ICD-10-CM

## 2023-05-19 NOTE — PROGRESS NOTES
Daily Note     Today's date: 2023  Patient name: Kalin Alan  : 1955  MRN: [id number]  Referring provider: Louann Cruz DO  Dx:   Encounter Diagnosis     ICD-10-CM    1  Chronic low back pain, unspecified back pain laterality, unspecified whether sciatica present  M54 50     G89 29                      Subjective: Pt reports that low back is sore today, wakes up most mornings w/ soreness but feels he is slowly improving  Objective: self dowel ext and ext w/ rotation improve pain free lumbar mobility, well maintained post        Assessment: Tolerated treatment well  Patient demonstrated fatigue post treatment and would benefit from continued PT  Does well w/ exercise progressions today, good response to active mobility work moreso than manual techniques  Will benefit from more aggressive challenges as sx allow  Focus on techniques that carryover at home  He will be seen 1x next week and we will determine next best steps at that visit  Likely 1-2 week break to allow for HEP prior to continuing, barring any setback  Plan: Continue per plan of care  review dowel mobility work, lifting mechanics     POC expires Auth Status Total   Visits  Start date  Expiration date PT/OT + Visit Limit?  Co-Insurance    MEDICARE     No                                             Dummy Auth Tracking  1 2 3 4 5 6   7 8 9 10 11 12   13 14 15 16 17 18   19 20 21 22 23 24   25 26 27 28 29 30   31 32 33 34 35 36         Precautions: hx of CABG x2 in , defibrillator and pacemaker implanted in , hx of open heart surgery, PTSD, hx of depression and anxiety      note opened in error    Date (Visit #) IE  (1)   (2)  (3)  5/15 (4)   (5)   Manual              DTM and TPR             Lumbar mobs  intro           Lumbar traction   x2 min hold B                                         Exercise Diary              Ther Ex        Active w/u             HS/glute/piri/HF stretching  tested HS and glute/piri   B HS and glute/piri x8 mins, intermittent traction x 2 mins B HS and glute/piri w/ intermittent LAD x 8 mins   same x 10 min total  same x 6 mins    Mobility (LTR, open books, cat/cow) 2x10 2x10-15  2x10 2x10  2x10    Rows/ext        Hip stability         GEGE x2 mins x2 mins only       PPU x8 Deferred after 8 no no       DKTC 2x10  SKTC x10 SKTC 2x10  2x10        Self lumbar ext w/ dowel x10, w/ rotation 2x10 B        3 way pball roll out x10 each way   Neuro Re Ed        TrA progressions   isos, march, single leg ext x10-15 each  TrA isos x10, single leg ext x10-15     90/90 taps 2x10 Isos, march 2x10    Yellow ball iso press w/ march 2x10, w/ single leg ext 2x10  isos, march, single leg ext 2x15 each   isos w/ ad sq x10   Bridge progressions  x10 total 2x10 w/ ad sq  no  2x10 w/ ad sq  2x10   Squat progressions    TRX x10-15   no       Hip abd progressions    green tband rows and ext 2x10 Blue tband sh ext iso w/ march 2x10  2x15  blue tband sh ext w/ march 2x15      pallof 2x10 reg, overhead chop 2x10 Blue tband pallof 2x10 B 2x15  pallof 2x10, w/ chop 2x10             HEP and POC review x8 mins w/ pain discussion Rev and update x 2-3 mins Rev and update x2-3 mins Rev x 2-3 mins  Rev x 2-3 mins        Black tband hip hinge x20        18# KB hinge x10, 26# 2x10             Ther Act             Stairs             Functional Transfers             Functional Lifting                                                                                                                                                           Modalities             heat             Ice              Mechanical Traction                Access Code: [email or web address]  URL: [email or web address]  Date: 05/05/2023  Prepared by: Yumiko Paris    Exercises  - Supine Transversus Abdominis Bracing - Hands on Stomach  - 1 x daily - 7 x weekly - 3 sets - 10 reps  - Supine March  - 1 x daily - 7 x weekly - 3 sets - 10 reps  - Supine Bridge  - 1 x daily - 7 x weekly - 3 sets - 10 reps  - Prone Press Up On Elbows  - 1 x daily - 7 x weekly - 3 sets - 10 reps  - Prone Press Up  - 1 x daily - 7 x weekly - 3 sets - 10 reps

## 2023-05-22 ENCOUNTER — OFFICE VISIT (OUTPATIENT)
Dept: PHYSICAL THERAPY | Facility: CLINIC | Age: 68
End: 2023-05-22

## 2023-05-22 DIAGNOSIS — G89.29 CHRONIC LOW BACK PAIN, UNSPECIFIED BACK PAIN LATERALITY, UNSPECIFIED WHETHER SCIATICA PRESENT: Primary | ICD-10-CM

## 2023-05-22 DIAGNOSIS — M54.50 CHRONIC LOW BACK PAIN, UNSPECIFIED BACK PAIN LATERALITY, UNSPECIFIED WHETHER SCIATICA PRESENT: Primary | ICD-10-CM

## 2023-05-22 NOTE — PROGRESS NOTES
Update Note     Today's date: 2023  Patient name: Raghu Zhou  : 1955  MRN: [id number]  Referring provider: Nancy Baez DO  Dx:   Encounter Diagnosis     ICD-10-CM    1  Chronic low back pain, unspecified back pain laterality, unspecified whether sciatica present  M54 50     G89 29                      Subjective: Pt reports that low back pain is relatively similar to IE  Notes that his walking capacity, ability to lift and carry, and general strength have started to improve, but his pain sx are relatively similar  Wants to continue w/ exercises on own over the next two weeks, will check in after that  Will be joining [facility]  [employer] as well, wants to get into a regular exercise program  Functional status update below:     Goals  Short term goals (3 weeks): ALL PROGRESSED  1) Pt will improve thoracolumbar mobility deficits by 25% pain free  2) Pt will improve B LE and core strength deficits by 1/3 grade MMT  3) Pt will improve pain at worse to <4/10  4) Pt will initiate and progress HEP w/ special emphasis on functional core control and thoracolumbar mobility  Long term goals (6 weeks) ALL PROGRESSED  1) Pt will improve FOTO to at least 69   2) Pt will sleep through the night in positioning of choosing 6/7 nights a week w/o waking due to pain  3) Pt will perform two full weeks of exercise and recreation w/ deficit related to low back, appropriate modifications  4) Pt will be independent and compliant w/ HEP in order to maximize functional benefit of skilled PT following d/c      *goals extended by 2 and 4 weeks    Objective:     Thoracolumbar ROM grossly 75% in all directions w/ hesitancy thereafter, improves w/ reps  Pain 0-6/10 overall    B LE grossly 4+/5    Core: at least 2+/5 today     Good form and glute activation w/ squat lift, w/ hip hinge patterns - can perform w/o back pain      Assessment: Tolerated treatment well   Patient demonstrated fatigue post treatment and would benefit from continued PT  Does well w/ exercise progressions today, reviewed HEP and added work that can be done in gym  He will continue w/ home exercises and gym program over the next two weeks and assess status thereafter  We will set up appt here to fully re-assess sx at that time pending progress  Pt in agreement w/ plan and excited to join gym  Plan: Continue per plan of care  check-in in 2-3 weeks pending progress     POC expires Auth Status Total   Visits  Start date  Expiration date PT/OT + Visit Limit?  Co-Insurance    MEDICARE     No                                             Dummy Auth Tracking  1 2 3 4 5 6   7 8 9 10 11 12   13 14 15 16 17 18   19 20 21 22 23 24   25 26 27 28 29 30   31 32 33 34 35 36         Precautions: hx of CABG x2 in 2007, defibrillator and pacemaker implanted in 2022, hx of open heart surgery, PTSD, hx of depression and anxiety     5/17 note opened in error    Date (Visit #) 5/22 (6) D/C   5/11 (3)  5/15 (4)  5/19 (5)   Manual              DTM and TPR             Lumbar mobs            Lumbar traction                                           Exercise Diary              Ther Ex        Active w/u             HS/glute/piri/HF stretching  tested HS and glute/piri x7 mins total   B HS and glute/piri x8 mins, intermittent traction x 2 mins B HS and glute/piri w/ intermittent LAD x 8 mins   same x 10 min total  same x 6 mins    Mobility (LTR, open books, cat/cow) 2x10 2x10-15  2x10 2x10  2x10    Rows/ext        Hip stability          x2 mins only        Deferred after 8 no no     SKTC x 10-15 total  DKTC 2x10  SKTC x10 SKTC 2x10  2x10    Self lumbar ext w/ dowel x 10, w/ rotations x10-15     Self lumbar ext w/ dowel x10, w/ rotation 2x10 B    Forward flexion x 10-15    3 way pball roll out x10 each way   Neuro Re Ed        TrA progressions  isos to 90/90 taps review and practice x 5 mins   TrA isos x10, single leg ext x10-15     90/90 taps 2x10 Isos, march 2x10    Yellow ball iso press w/ march 2x10, w/ single leg ext 2x10  isos, march, single leg ext 2x15 each   isos w/ ad sq x10   Bridge progressions 2x10  2x10 w/ ad sq  no  2x10 w/ ad sq  2x10   Squat progressions  TRX x20  TRX x10-15   no       Hip abd progressions  TRX rows, high pull, press - rev and practice x 5 mins   green tband rows and ext 2x10 Blue tband sh ext iso w/ march 2x10  2x15  blue tband sh ext w/ march 2x15     pallof blue tband x 20 pallof 2x10 reg, overhead chop 2x10 Blue tband pallof 2x10 B 2x15  pallof 2x10, w/ chop 2x10             Rev x 2-3 mins  Rev and update x 2-3 mins Rev and update x2-3 mins Rev x 2-3 mins  Rev x 2-3 mins        Black tband hip hinge x20        18# KB hinge x10, 26# 2x10             Ther Act             Stairs             Functional Transfers             Functional Lifting                                                                                                                                                           Modalities             heat             Ice              Mechanical Traction                Access Code: [email or web address]  URL: [email or web address]  Date: 05/05/2023  Prepared by: Niecy Florez    Exercises  - Supine Transversus Abdominis Bracing - Hands on Stomach  - 1 x daily - 7 x weekly - 3 sets - 10 reps  - Supine March  - 1 x daily - 7 x weekly - 3 sets - 10 reps  - Supine Bridge  - 1 x daily - 7 x weekly - 3 sets - 10 reps  - Prone Press Up On Elbows  - 1 x daily - 7 x weekly - 3 sets - 10 reps  - Prone Press Up  - 1 x daily - 7 x weekly - 3 sets - 10 reps

## 2023-05-30 ENCOUNTER — OFFICE VISIT (OUTPATIENT)
Dept: PHYSICAL THERAPY | Facility: CLINIC | Age: 68
End: 2023-05-30

## 2023-05-30 DIAGNOSIS — G89.29 CHRONIC LOW BACK PAIN, UNSPECIFIED BACK PAIN LATERALITY, UNSPECIFIED WHETHER SCIATICA PRESENT: Primary | ICD-10-CM

## 2023-05-30 DIAGNOSIS — M54.50 CHRONIC LOW BACK PAIN, UNSPECIFIED BACK PAIN LATERALITY, UNSPECIFIED WHETHER SCIATICA PRESENT: Primary | ICD-10-CM

## 2023-05-30 NOTE — PROGRESS NOTES
Daily Note      Today's date: 2023  Patient name: Marisel Lozano  : 1955  MRN: [id number]  Referring provider: Namita Francisco DO  Dx:   Encounter Diagnosis     ICD-10-CM    1  Chronic low back pain, unspecified back pain laterality, unspecified whether sciatica present  M54 50     G89 29           Subjective: Pt reports 4/10 pain in the low back, which he states is his typical pain  Pt denies radicular symptoms at this time  Pt states that he is still in pain and would like to resume physical therapy  Pt states that he has enrolled in the [facility]'s gym and plans on scheduling his consultation with the   Pt states that prior to his defibrillator/pacemaker placement, he used to work out all the time  Pt requests guidance on what he should and should not be doing in the gym  Pt states that he lost significant weight/muscle mass when he stopped working out after his procedure  Objective: See treatment diary below    Assessment: Pt tolerated treatment well  Pt presented without peripheralization of symptoms throughout today's treatment with flexion or extension exercises  Pt notes increased R LBP during knee to chest and standing extension exercises that dissipated quickly after the motion ended  Pt noted poor tolerance for press ups in the past, so this was held today  Pt introduced to standing physioball press exercises and tolerated well  Pt plans to initiate gym program next week and then will return to primary PT on or around 23  Plan: Continue per plan of care  Progress treatment as tolerated  POC expires Auth Status Total   Visits  Start date  Expiration date PT/OT + Visit Limit?  Co-Insurance    MEDICARE     No                                             Dummy Auth Tracking  1 2 3 4 5 6   7 8 9 10 11 12   13 14 15 16 17 18   19 20 21 22 23 24   25 26 27 28 29 30   31 32 33 34 35 36         Precautions: hx of CABG x2 in , defibrillator and pacemaker implanted in 2022, hx of open heart surgery, PTSD, hx of depression and anxiety     5/17 note opened in error    Date (Visit #) 5/22 (6) D/C  5/30 (7)  5/15 (4)  5/19 (5)   Manual              DTM and TPR             Lumbar mobs            Lumbar traction                                           Exercise Diary              Ther Ex        Active w/u             HS/glute/piri/HF stretching  tested HS and glute/piri x7 mins total   B HS and glute/piri w/ intermittent LAD x 8 mins   same x 10 min total  same x 6 mins    Mobility (LTR, open books, cat/cow) 2x10 LTR 2x10  2x10 2x10  2x10    Rows/ext        Hip stability                   no no     SKTC x 10-15 total DKTC 2x10  SKTC 10x ea   DKTC 2x10  SKTC x10 SKTC 2x10  2x10    Self lumbar ext w/ dowel x 10, w/ rotations x10-15  MICHELLE w/ dowel 15x, rotations 15x    Self lumbar ext w/ dowel x10, w/ rotation 2x10 B    Forward flexion x 10-15    3 way pball roll out x10 each way   Neuro Re Ed        TrA progressions  isos to 90/90 taps review and practice x 5 mins   TrA isos x10, single leg ext x10-15     90/90 taps 2x10 Isos, march 2x10    Yellow ball iso press w/ march 2x10, w/ single leg ext 2x10  isos, march, single leg ext 2x15 each   isos w/ ad sq x10   Bridge progressions 2x10  2x10 w/ ad sq  no  2x10 w/ ad sq  2x10   Squat progressions  TRX x20   no       Hip abd progressions  TRX rows, high pull, press - rev and practice x 5 mins  PB press w/ hip abd 15x B/L  Blue tband sh ext iso w/ march 2x10  2x15  blue tband sh ext w/ march 2x15     pallof blue tband x 20  Blue tband pallof 2x10 B 2x15  pallof 2x10, w/ chop 2x10             Rev x 2-3 mins  Rev and update x 2-3 mins Rev and update x2-3 mins Rev x 2-3 mins  Rev x 2-3 mins        Black tband hip hinge x20        18# KB hinge x10, 26# 2x10             Ther Act             Stairs             Functional Transfers             Functional Lifting Modalities             heat             Ice              Mechanical Traction                Access Code: [email or web address]  URL: [email or web address]  Date: 05/05/2023  Prepared by: Wood Echavarria    Exercises  - Supine Transversus Abdominis Bracing - Hands on Stomach  - 1 x daily - 7 x weekly - 3 sets - 10 reps  - Supine March  - 1 x daily - 7 x weekly - 3 sets - 10 reps  - Supine Bridge  - 1 x daily - 7 x weekly - 3 sets - 10 reps  - Prone Press Up On Elbows  - 1 x daily - 7 x weekly - 3 sets - 10 reps  - Prone Press Up  - 1 x daily - 7 x weekly - 3 sets - 10 reps

## 2023-06-13 ENCOUNTER — EVALUATION (OUTPATIENT)
Dept: PHYSICAL THERAPY | Facility: CLINIC | Age: 68
End: 2023-06-13
Payer: MEDICARE

## 2023-06-13 DIAGNOSIS — G89.29 CHRONIC LOW BACK PAIN, UNSPECIFIED BACK PAIN LATERALITY, UNSPECIFIED WHETHER SCIATICA PRESENT: Primary | ICD-10-CM

## 2023-06-13 DIAGNOSIS — M54.50 CHRONIC LOW BACK PAIN, UNSPECIFIED BACK PAIN LATERALITY, UNSPECIFIED WHETHER SCIATICA PRESENT: Primary | ICD-10-CM

## 2023-06-13 PROCEDURE — 97110 THERAPEUTIC EXERCISES: CPT

## 2023-06-13 PROCEDURE — 97112 NEUROMUSCULAR REEDUCATION: CPT

## 2023-06-13 NOTE — PROGRESS NOTES
Update Note     Today's date: 2023  Patient name: Nader Gasca  : 1955  MRN: [id number]  Referring provider: Honey Maya DO  Dx:   Encounter Diagnosis     ICD-10-CM    1  Chronic low back pain, unspecified back pain laterality, unspecified whether sciatica present  M54 50     G89 29                      Subjective: Pt reports that he still get stiff in morning, but that his sx overall are improving  Notes that gym has been going well  He is slowly increasing activity level  Wants to do 2-3 more sessions while he transitions into gym program to ensure that he is good to go  Objective: thoracolumbar ROM grossly at least 75% of normal limits w/o pain except for forward flexion, forward flexion improves to near 100% pain free post Porfirio curl    Goals  Short term goals (3 weeks): ALL PROGRESSED  1) Pt will improve thoracolumbar mobility deficits by 25% pain free  2) Pt will improve B LE and core strength deficits by 1/3 grade MMT  3) Pt will improve pain at worse to <4/10  4) Pt will initiate and progress HEP w/ special emphasis on functional core control and thoracolumbar mobility  Long term goals (6 weeks) ALL PROGRESSED  1) Pt will improve FOTO to at least 69   2) Pt will sleep through the night in positioning of choosing 6/7 nights a week w/o waking due to pain  3) Pt will perform two full weeks of exercise and recreation w/ deficit related to low back, appropriate modifications  4) Pt will be independent and compliant w/ HEP in order to maximize functional benefit of skilled PT following d/c      *goals extended by 2 and 4 weeks    Pain 4-8/10  Core strength at least 3+5 today w/ form break at end of 4/5     No red flag signs    No loss of sensation    Gait, squat, functional lifting motions are controlled and pain free following mobility work      Assessment: Tolerated treatment well  Patient demonstrated fatigue post treatment and would benefit from continued PT   Pt is improving, pain on average is improved and he is doing more on a daily basis  Strength and functional core control improvements have led to greater control over functional motions and improved overall quality of life  We have visits scheduled on 2x/week basis until the end of the month to ensure that progress continues  Pt in agreement w/ plan  Plan: Continue per plan of care  TOBIN RAI progressions, seated good mornings      POC expires Auth Status Total   Visits  Start date  Expiration date PT/OT + Visit Limit? Co-Insurance    MEDICARE     No                                             Dummy Auth Tracking  1 2 3 4 5 6   7 8 9 10 11 12   13 14 15 16 17 18   19 20 21 22 23 24   25 26 27 28 29 30   31 32 33 34 35 36         Precautions: hx of CABG x2 in 2007, defibrillator and pacemaker implanted in 2022, hx of open heart surgery, PTSD, hx of depression and anxiety     5/17 note opened in error    Date (Visit #) 5/22 (6) 5/30 (7) 6/13 (8) PN  5/19 (5)   Manual              DTM and TPR             Lumbar mobs            Lumbar traction                                           Exercise Diary              Ther Ex        Active w/u             HS/glute/piri/HF stretching  tested HS and glute/piri x7 mins total   B HS and glute/piri w/ intermittent LAD x 10 mins   same x 10 min total  same x 6 mins    Mobility (LTR, open books, cat/cow) 2x10 LTR 2x10  2x10 2x10  2x10    Rows/ext        Hip stability                   no no     SKTC x 10-15 total DKTC 2x10  SKTC 10x ea   rev SKTC 2x10  2x10    Self lumbar ext w/ dowel x 10, w/ rotations x10-15  MICHELLE w/ dowel 15x, rotations 15x  RFIS x10  Self lumbar ext w/ dowel x10, w/ rotation 2x10 B    Forward flexion x 10-15    3 way pball roll out x10 each way   Neuro Re Ed        TrA progressions  isos to 90/90 taps review and practice x 5 mins   TrA isos x10, single leg ext x10-15     90/90 taps 2x10 Isos, marchx 10 each    90/90 taps, double leg ext x20 each    18# KB pull throughs x 20   isos, march, single leg ext 2x15 each   isos w/ ad sq x10   Bridge progressions 2x10  2x10 w/ ad sq x20 reg    x20 march  2x10 w/ ad sq  2x10   Squat progressions  TRX x20         Hip abd progressions  TRX rows, high pull, press - rev and practice x 5 mins  PB press w/ hip abd 15x B/L  TRX fall outs, rows, kickstand squats, lat squats x20 each  2x15  blue tband sh ext w/ march 2x15     pallof blue tband x 20   2x15  pallof 2x10, w/ chop 2x10             Rev x 2-3 mins  Rev and update x 2-3 mins Rev and update x5 mins Rev x 2-3 mins  Rev x 2-3 mins        Black tband hip hinge x20        18# KB hinge x10, 26# 2x10             Ther Act             Stairs             Functional Transfers             Functional Lifting                                                                                                                                                           Modalities             heat             Ice              Mechanical Traction                Access Code: [email or web address]  URL: [email or web address]  Date: 05/05/2023  Prepared by: Dolly Mendez    Exercises  - Supine Transversus Abdominis Bracing - Hands on Stomach  - 1 x daily - 7 x weekly - 3 sets - 10 reps  - Supine March  - 1 x daily - 7 x weekly - 3 sets - 10 reps  - Supine Bridge  - 1 x daily - 7 x weekly - 3 sets - 10 reps  - Prone Press Up On Elbows  - 1 x daily - 7 x weekly - 3 sets - 10 reps  - Prone Press Up  - 1 x daily - 7 x weekly - 3 sets - 10 reps

## 2023-06-15 ENCOUNTER — OFFICE VISIT (OUTPATIENT)
Dept: PHYSICAL THERAPY | Facility: CLINIC | Age: 68
End: 2023-06-15
Payer: MEDICARE

## 2023-06-15 DIAGNOSIS — G89.29 CHRONIC LOW BACK PAIN, UNSPECIFIED BACK PAIN LATERALITY, UNSPECIFIED WHETHER SCIATICA PRESENT: Primary | ICD-10-CM

## 2023-06-15 DIAGNOSIS — M54.50 CHRONIC LOW BACK PAIN, UNSPECIFIED BACK PAIN LATERALITY, UNSPECIFIED WHETHER SCIATICA PRESENT: Primary | ICD-10-CM

## 2023-06-15 PROCEDURE — 97110 THERAPEUTIC EXERCISES: CPT

## 2023-06-15 NOTE — PROGRESS NOTES
Daily Note     Today's date: 6/15/2023  Patient name: Jo Masterson  : 1955  MRN: [id number]  Referring provider: González Ragsdale DO  Dx:   Encounter Diagnosis     ICD-10-CM    1  Chronic low back pain, unspecified back pain laterality, unspecified whether sciatica present  M54 50     G89 29                      Subjective: Pt reports he has back soreness today  Objective: See treatment diary below      Assessment: Pt is able to complete all new TE added today with good tolerance,no increased pain during or post tx  Plan: Continue per plan of care  POC expires Auth Status Total   Visits  Start date  Expiration date PT/OT + Visit Limit?  Co-Insurance    MEDICARE     No                                             Dummy Auth Tracking  1 2 3 4 5 6   7 8 9 10 11 12   13 14 15 16 17 18   19 20 21 22 23 24   25 26 27 28 29 30   31 32 33 34 35 36         Precautions: hx of CABG x2 in , defibrillator and pacemaker implanted in , hx of open heart surgery, PTSD, hx of depression and anxiety         Date (Visit #)  (6)  (7)  (8) PN 6/15 (9)   Manual            DTM and TPR           Lumbar mobs          Lumbar traction                                     Exercise Diary           Ther Ex       Active w/u          HS/glute/piri/HF stretching B HS and glute/piri w/ intermittent LAD x 10 mins   same x 10 min total  same x 6 mins     Mobility (LTR, open books, cat/cow) 2x10 2x10  2x10  2x10   Rows/ext       Hip stability               no no      rev SKTC 2x10  2x10     RFIS x10  Self lumbar ext w/ dowel x10, w/ rotation 2x10 B       3 way pball roll out x10 each way    Neuro Re Ed       TrA progressions  Isos, marchx 10 each    90/90 taps, double leg ext x20 each    18# KB pull throughs x 20   isos, march, single leg ext 2x15 each   isos w/ ad sq x10    Bridge progressions x20 reg    x20 march  2x10 w/ ad sq  2x10 2x10   Squat progressions      TRX x30   Hip abd progressions TRX fall "outs, rows, kickstand squats, lat squats x20 each  2x15  blue tband sh ext w/ march 2x15       2x15  pallof 2x10, w/ chop 2x10  CC pallof 7 5# x20 ea   CC ext w/ march    7 5# x20                         CC chops    7 5# x15 ea    Rev and update x5 mins Rev x 2-3 mins  Rev x 2-3 mins       Black tband hip hinge x20       18# KB hinge x10, 26# 2x10   KB deadlifts 26# to 8\" box   CC sidestepping    12 5# x5 ea   Ther Act          Stairs          Functional Transfers          Functional Lifting                                                                                                                          Modalities             heat             Ice              Mechanical Traction                Access Code: [email or web address]  URL: [email or web address]  Date: 05/05/2023  Prepared by: Jennifer Watts    Exercises  - Supine Transversus Abdominis Bracing - Hands on Stomach  - 1 x daily - 7 x weekly - 3 sets - 10 reps  - Supine March  - 1 x daily - 7 x weekly - 3 sets - 10 reps  - Supine Bridge  - 1 x daily - 7 x weekly - 3 sets - 10 reps  - Prone Press Up On Elbows  - 1 x daily - 7 x weekly - 3 sets - 10 reps  - Prone Press Up  - 1 x daily - 7 x weekly - 3 sets - 10 reps                            "

## 2023-06-20 ENCOUNTER — OFFICE VISIT (OUTPATIENT)
Dept: PHYSICAL THERAPY | Facility: CLINIC | Age: 68
End: 2023-06-20
Payer: MEDICARE

## 2023-06-20 DIAGNOSIS — G89.29 CHRONIC LOW BACK PAIN, UNSPECIFIED BACK PAIN LATERALITY, UNSPECIFIED WHETHER SCIATICA PRESENT: Primary | ICD-10-CM

## 2023-06-20 DIAGNOSIS — M54.50 CHRONIC LOW BACK PAIN, UNSPECIFIED BACK PAIN LATERALITY, UNSPECIFIED WHETHER SCIATICA PRESENT: Primary | ICD-10-CM

## 2023-06-20 PROCEDURE — 97112 NEUROMUSCULAR REEDUCATION: CPT

## 2023-06-20 PROCEDURE — 97110 THERAPEUTIC EXERCISES: CPT

## 2023-06-20 NOTE — PROGRESS NOTES
Daily Note     Today's date: 2023  Patient name: Cate Javier  : 1955  MRN: [id number]  Referring provider: Karen Novoa DO  Dx:   Encounter Diagnosis     ICD-10-CM    1  Chronic low back pain, unspecified back pain laterality, unspecified whether sciatica present  M54 50     G89 29                      Subjective: Pt reports no new complaints  Reports that gym has gone well, feels he is getting stronger  Main complaint is continued stiffness/soreness first thing in morning  Objective: requires cueing w/ seated good mornings to drive motion through spinal erectors, tends to over utilize shoulders  Corrects intermittently  Assessment: Tolerated treatment well  Patient demonstrated fatigue post treatment and would benefit from continued PT  Does well w/ exercises today, fatigue but no increase in pain  Was given referral for Dr Willetta Saint today, he will call to schedule visit on own  He will continue w/ strength work in meantime  Pt encouraged by progress  Plan: Continue per plan of care  possible last visit, finalize HEP     POC expires Auth Status Total   Visits  Start date  Expiration date PT/OT + Visit Limit?  Co-Insurance    MEDICARE     No                                             Dummy Auth Tracking  1 2 3 4 5 6   7 8 9 10 11 12   13 14 15 16 17 18   19 20 21 22 23 24   25 26 27 28 29 30   31 32 33 34 35 36         Precautions: hx of CABG x2 in , defibrillator and pacemaker implanted in , hx of open heart surgery, PTSD, hx of depression and anxiety         Date (Visit #)  (6)  (7)  (8) PN 6/15 (9)  (10)   Manual             DTM and TPR            Lumbar mobs           Lumbar traction                                        Exercise Diary            Ther Ex        Active w/u           HS/glute/piri/HF stretching B HS and glute/piri w/ intermittent LAD x 10 mins   same x 10 min total  same x 6 mins   x5-6 mins    Mobility (LTR, open books, cat/cow) 2x10 "2x10  2x10  2x10 x20   Rows/ext        Hip stability                 no no       rev SKTC 2x10  2x10  x10-15 B    RFIS x10  Self lumbar ext w/ dowel x10, w/ rotation 2x10 B  rev      3 way pball roll out x10 each way     Neuro Re Ed        TrA progressions  Isos, marchx 10 each    90/90 taps, double leg ext x20 each    18# KB pull throughs x 20   isos, march, single leg ext 2x15 each   isos w/ ad sq x10  isos - 90/90 single leg exts x 10-15 each    Bridge progressions x20 reg    x20 march  2x10 w/ ad sq  2x10 2x10 2x10 w/ march    Squat progressions      TRX x30 rev   Hip abd progressions TRX fall outs, rows, kickstand squats, lat squats x20 each  2x15  blue tband sh ext w/ march 2x15   TRX rows, T's x20 each      2x15  pallof 2x10, w/ chop 2x10  CC pallof 7 5# x20 ea    CC ext w/ march    7 5# x20          Seated good morning 32 5# x20        18# Porfirio curl x15            CC chops    7 5# x15 ea     Rev and update x5 mins Rev x 2-3 mins  Rev x 2-3 mins   Rev and update x 2-3 mins     Black tband hip hinge x20        18# KB hinge x10, 26# 2x10   KB deadlifts 26# to 8\" box    CC sidestepping    12 5# x5 ea    Ther Act           Stairs           Functional Transfers           Functional Lifting                                                                                                                                     Modalities             heat             Ice              Mechanical Traction                Access Code: [email or web address]  URL: [email or web address]  Date: 05/05/2023  Prepared by: Kofi Moore    Exercises  - Supine Transversus Abdominis Bracing - Hands on Stomach  - 1 x daily - 7 x weekly - 3 sets - 10 reps  - Supine March  - 1 x daily - 7 x weekly - 3 sets - 10 reps  - Supine Bridge  - 1 x daily - 7 x weekly - 3 sets - 10 reps  - Prone Press Up On Elbows  - 1 x daily - 7 x weekly - 3 sets - 10 reps  - Prone Press Up  - 1 x daily - 7 x weekly - 3 sets - 10 reps                   "

## 2023-06-22 ENCOUNTER — OFFICE VISIT (OUTPATIENT)
Dept: PHYSICAL THERAPY | Facility: CLINIC | Age: 68
End: 2023-06-22
Payer: MEDICARE

## 2023-06-22 DIAGNOSIS — M54.50 CHRONIC LOW BACK PAIN, UNSPECIFIED BACK PAIN LATERALITY, UNSPECIFIED WHETHER SCIATICA PRESENT: Primary | ICD-10-CM

## 2023-06-22 DIAGNOSIS — G89.29 CHRONIC LOW BACK PAIN, UNSPECIFIED BACK PAIN LATERALITY, UNSPECIFIED WHETHER SCIATICA PRESENT: Primary | ICD-10-CM

## 2023-06-22 PROCEDURE — 97110 THERAPEUTIC EXERCISES: CPT

## 2023-06-22 PROCEDURE — 97112 NEUROMUSCULAR REEDUCATION: CPT

## 2023-06-22 NOTE — PROGRESS NOTES
Possible D/C Note     Today's date: 2023  Patient name: Cami Cid  : 1955  MRN: [id number]  Referring provider: Emeterio Mckeon DO  Dx:   Encounter Diagnosis     ICD-10-CM    1  Chronic low back pain, unspecified back pain laterality, unspecified whether sciatica present  M54 50     G89 29                      Subjective: Pt reports normal morning stiffness/soreness  Feels that he is improving  Notes that he has not yet reached out to pain medicine but will do that in coming days  Will continue w/ exercise program and call or email w/ future issues  Goals  Short term goals (3 weeks): ALL PROGRESSED  1) Pt will improve thoracolumbar mobility deficits by 25% pain free  2) Pt will improve B LE and core strength deficits by 1/3 grade MMT  3) Pt will improve pain at worse to <4/10  4) Pt will initiate and progress HEP w/ special emphasis on functional core control and thoracolumbar mobility  Long term goals (6 weeks) ALL PROGRESSED  1) Pt will improve FOTO to at least 69   2) Pt will sleep through the night in positioning of choosing 6/7 nights a week w/o waking due to pain  3) Pt will perform two full weeks of exercise and recreation w/ deficit related to low back, appropriate modifications  4) Pt will be independent and compliant w/ HEP in order to maximize functional benefit of skilled PT following d/c      *goals extended by 2 and 4 weeks - all goals achieved at time of d/c       Objective: please see  note for full list of measurements at time of d/c  Assessment: Tolerated treatment well  Patient demonstrated fatigue post treatment and exhibited good technique with therapeutic exercises  Pt has made excellent progress in skilled PT  He shows good tolerance to exercise progressions, will continue w/ HEP and gym program and call in the next 2-3 weeks w/ any issues or concerns  He has achieved or made significant progress towards all goals   Should he require more help after the next 4 weeks, a new case will be opened  Pt in agreement w/ plan  Plan: Continue per plan of care  possible d/c  POC expires Auth Status Total   Visits  Start date  Expiration date PT/OT + Visit Limit?  Co-Insurance    MEDICARE     No                                             Dummy Auth Tracking  1 2 3 4 5 6   7 8 9 10 11 12   13 14 15 16 17 18   19 20 21 22 23 24   25 26 27 28 29 30   31 32 33 34 35 36         Precautions: hx of CABG x2 in 2007, defibrillator and pacemaker implanted in 2022, hx of open heart surgery, PTSD, hx of depression and anxiety         Date (Visit #) 5/30 (7) 6/13 (8) PN 6/15 (9) 6/20 (10) 6/22 (11)   Manual            DTM and TPR           Lumbar mobs           Lumbar traction                                      Exercise Diary            Ther Ex        Active w/u           HS/glute/piri/HF stretching B HS and glute/piri w/ intermittent LAD x 10 mins   same x 10 min total  same x 6 mins   x5-6 mins    Mobility (LTR, open books, cat/cow) 2x10 2x10  2x10  2x10 x20   Rows/ext        Hip stability                 no no       rev SKTC 2x10  2x10  x10-15 B    RFIS x10  Self lumbar ext w/ dowel x10, w/ rotation 2x10 B  rev      3 way pball roll out x10 each way     Neuro Re Ed        TrA progressions  Isos, marchx 10 each    90/90 taps, double leg ext x20 each    18# KB pull throughs x 20   isos, march, single leg ext 2x15 each   isos w/ ad sq x10  rev   Bridge progressions x20 reg    x20 march  2x10 w/ ad sq  2x10 2x10 2x10 reg, march 2x10   Squat progressions      TRX x30    Hip abd progressions TRX fall outs, rows, kickstand squats, lat squats x20 each  2x15  blue tband sh ext w/ march 2x15   TRX rows, low and high x15 each     TRX chest press 2x10      2x15  pallof 2x10, w/ chop 2x10  CC pallof 7 5# x20 ea    CC ext w/ march    7 5# x20          18# KB carry x 50' B  26# 3x50' B                   CC chops    7 5# x15 ea     Rev and update x5 mins Rev x 2-3 mins  Rev x 2-3 mins "Final HEP rev x 5 mins      Black tband hip hinge x20        18# KB hinge x10, 26# 2x10   KB deadlifts 26# to 8\" box    CC sidestepping    12 5# x5 ea    Ther Act           Stairs           Functional Transfers           Functional Lifting                                                                                                                                     Modalities             heat             Ice              Mechanical Traction                Access Code: [email or web address]  URL: [email or web address]  Date: 05/05/2023  Prepared by: Grandville Frankel    Exercises  - Supine Transversus Abdominis Bracing - Hands on Stomach  - 1 x daily - 7 x weekly - 3 sets - 10 reps  - Supine March  - 1 x daily - 7 x weekly - 3 sets - 10 reps  - Supine Bridge  - 1 x daily - 7 x weekly - 3 sets - 10 reps  - Prone Press Up On Elbows  - 1 x daily - 7 x weekly - 3 sets - 10 reps  - Prone Press Up  - 1 x daily - 7 x weekly - 3 sets - 10 reps                                "

## 2023-07-25 ENCOUNTER — OFFICE VISIT (OUTPATIENT)
Dept: FAMILY MEDICINE CLINIC | Facility: CLINIC | Age: 68
End: 2023-07-25
Payer: MEDICARE

## 2023-07-25 VITALS
BODY MASS INDEX: 25.23 KG/M2 | HEART RATE: 74 BPM | HEIGHT: 66 IN | OXYGEN SATURATION: 98 % | SYSTOLIC BLOOD PRESSURE: 126 MMHG | TEMPERATURE: 97.8 F | RESPIRATION RATE: 18 BRPM | WEIGHT: 157 LBS | DIASTOLIC BLOOD PRESSURE: 66 MMHG

## 2023-07-25 DIAGNOSIS — J06.9 UPPER RESPIRATORY TRACT INFECTION, UNSPECIFIED TYPE: Primary | ICD-10-CM

## 2023-07-25 PROCEDURE — 99213 OFFICE O/P EST LOW 20 MIN: CPT | Performed by: FAMILY MEDICINE

## 2023-07-25 RX ORDER — AZITHROMYCIN 250 MG/1
TABLET, FILM COATED ORAL
Qty: 6 TABLET | Refills: 0 | Status: SHIPPED | OUTPATIENT
Start: 2023-07-25 | End: 2023-07-30

## 2023-07-25 RX ORDER — METHYLPREDNISOLONE 4 MG/1
TABLET ORAL
Qty: 21 EACH | Refills: 0 | Status: SHIPPED | OUTPATIENT
Start: 2023-07-25

## 2023-07-25 NOTE — PROGRESS NOTES
Assessment/Plan:    1. Upper respiratory tract infection, unspecified type  -     methylPREDNISolone 4 MG tablet therapy pack; Use as directed on package  -     azithromycin (ZITHROMAX) 250 mg tablet; 2 tabs on day 1, 1 tab a day for 4 days after            There are no Patient Instructions on file for this visit. No follow-ups on file. Subjective:      Patient ID: Frank Simms is a 79 y.o. male. Chief Complaint   Patient presents with   • Dizziness   • Shortness of Breath   • Cough     Came back from [place] Saturday Night  Covid neg home test neg 2 days ago  rmklpn       Pt is sched for a same day appt    PT states he got back from [facility] three days ago. That night pt states he started feeling tired and SOB, cough and sneeze. Pt states he was having palps - yesterday his HR went up to 183 but then it came back down. Pt was taking chloricedent        The following portions of the patient's history were reviewed and updated as appropriate: allergies, current medications, past family history, past medical history, past social history, past surgical history and problem list.    Review of Systems   HENT: Positive for congestion. Respiratory: Positive for cough and shortness of breath. Current Outpatient Medications   Medication Sig Dispense Refill   • amLODIPine (NORVASC) 10 mg tablet Take 10 mg by mouth every evening       • aspirin (ECOTRIN LOW STRENGTH) 81 mg EC tablet TAKE ONE TABLET BY MOUTH DAILY FOR THE HEART.      • atorvastatin (LIPITOR) 80 mg tablet Take 80 mg by mouth in the morning     • azithromycin (ZITHROMAX) 250 mg tablet 2 tabs on day 1, 1 tab a day for 4 days after 6 tablet 0   • buPROPion (WELLBUTRIN XL) 150 mg 24 hr tablet Take 150 mg by mouth       • cyclobenzaprine (FLEXERIL) 10 mg tablet Take 10 mg by mouth daily as needed       • escitalopram (LEXAPRO) 20 mg tablet Take 20 mg by mouth       • Flovent  MCG/ACT inhaler      • fluticasone (FLONASE) 50 mcg/act nasal spray as needed     • fluticasone (Flovent HFA) 110 MCG/ACT inhaler Inhale 2 puffs 2 (two) times a day Rinse mouth after use. 12 g 0   • folic acid (FOLVITE) 1 mg tablet 1 mg     • furosemide (LASIX) 20 mg tablet if needed     • hydrochlorothiazide (HYDRODIURIL) 25 mg tablet Take 12.5 mg by mouth daily     • levalbuterol (XOPENEX HFA) 45 mcg/act inhaler Inhale every 6 (six) hours as needed     • lidocaine (LIDODERM) 5 % APPLY 1 PATCH ON SKIN DAILY FOR PAIN. DO NOT WEAR FOR LONGER THAN 12 HOURS IN ANY 24 HOUR PERIOD. [address] YOUR HANDS AFTER APPLYING A PATCH. • losartan (COZAAR) 50 mg tablet Take 100 mg by mouth daily Patient is taking one pill per day of 50mg. • melatonin 3 mg Take 3 mg by mouth     • methylPREDNISolone 4 MG tablet therapy pack Use as directed on package 21 each 0   • metoprolol succinate (TOPROL-XL) 50 mg 24 hr tablet Take 50 mg by mouth 2 (two) times a day     • montelukast (SINGULAIR) 10 mg tablet Take 10 mg by mouth     • omeprazole (PriLOSEC) 20 mg delayed release capsule take 1 capsule by mouth once daily 60 capsule 1   • prazosin (MINIPRESS) 5 mg capsule Take 5 mg by mouth daily at bedtime     • sodium chloride (OCEAN) 0.65 % nasal spray 1 spray into each nostril 4 (four) times a day as needed     • Spacer/Aero-Holding Chambers (B[employer]he[employer] Spacer Adult) MISC Use 1 each     • Spacer/Aero-Holding Chambers (OptiChamber Elicia) MISC      • predniSONE 10 mg tablet Take 30 mg by mouth daily (Patient not taking: Reported on 7/25/2023)       No current facility-administered medications for this visit. Objective:    /66   Pulse 74   Temp 97.8 °F (36.6 °C)   Resp 18   Ht 5' 6" (1.676 m)   Wt 71.2 kg (157 lb)   SpO2 98%   BMI 25.34 kg/m²        Physical Exam  HENT:      Nose: Congestion and rhinorrhea present.                 Lucero Jama DO

## 2023-09-29 ENCOUNTER — TELEPHONE (OUTPATIENT)
Age: 68
End: 2023-09-29

## 2023-09-29 ENCOUNTER — EVALUATION (OUTPATIENT)
Dept: PHYSICAL THERAPY | Facility: CLINIC | Age: 68
End: 2023-09-29
Payer: MEDICARE

## 2023-09-29 DIAGNOSIS — M54.42 CHRONIC RIGHT-SIDED LOW BACK PAIN WITH BILATERAL SCIATICA: Primary | ICD-10-CM

## 2023-09-29 DIAGNOSIS — G89.29 CHRONIC LOW BACK PAIN, UNSPECIFIED BACK PAIN LATERALITY, UNSPECIFIED WHETHER SCIATICA PRESENT: Primary | ICD-10-CM

## 2023-09-29 DIAGNOSIS — M54.50 CHRONIC LOW BACK PAIN, UNSPECIFIED BACK PAIN LATERALITY, UNSPECIFIED WHETHER SCIATICA PRESENT: Primary | ICD-10-CM

## 2023-09-29 DIAGNOSIS — G89.29 CHRONIC RIGHT-SIDED LOW BACK PAIN WITH BILATERAL SCIATICA: Primary | ICD-10-CM

## 2023-09-29 DIAGNOSIS — M54.41 CHRONIC RIGHT-SIDED LOW BACK PAIN WITH BILATERAL SCIATICA: Primary | ICD-10-CM

## 2023-09-29 PROCEDURE — 97112 NEUROMUSCULAR REEDUCATION: CPT

## 2023-09-29 PROCEDURE — 97161 PT EVAL LOW COMPLEX 20 MIN: CPT

## 2023-09-29 RX ORDER — MELOXICAM 15 MG/1
15 TABLET ORAL DAILY
Qty: 30 TABLET | Refills: 0 | Status: SHIPPED | OUTPATIENT
Start: 2023-09-29 | End: 2023-12-28

## 2023-09-29 NOTE — PROGRESS NOTES
PT Evaluation     Today's date: 2023  Patient name: Jose Martin Beth  : 1955  MRN: [id number]  Referring provider: Devika Huizar DO  Dx:   Encounter Diagnosis     ICD-10-CM    1. Chronic right-sided low back pain with bilateral sciatica  M54.41     M54.42     G89.29           Start Time: 0847  Stop Time: 09  Total time in clinic (min): 38 minutes    Assessment  Assessment details: Pt is a pleasant 76 y.o. male who presents to [facility] with acute on chronic R sided low back pain. He has been treated in the past for this condition w/ good results. Today, he presents with weakness, decreased ROM, new onset of impairment of functional mobility, decreased ADLS, pain and decreased strength. Functionally, he is limited in his ability to stand and ambulate, negotiate stairs, perform functional lifting and exercise, and sleep through the night. Pt is motivated to improve. Pt will benefit from skilled PT to address the aforementioned deficits and limitations in an effort to maximize pain free functional mobility and overall quality of life. Progress as able with these goals in mind. Impairments: abnormal coordination, abnormal gait, abnormal muscle firing, abnormal muscle tone, abnormal or restricted ROM, abnormal movement, activity intolerance, impaired physical strength and pain with function  Understanding of Dx/Px/POC: good   Prognosis: good    Goals  Short term goals (3 weeks):  1) Pt will improve thoracolumbar mobility deficits by 25% pain free. 2) Pt will improve B LE and core strength deficits by 1/3 grade MMT. 3) Pt will improve pain at worse to <4/10. 4) Pt will initiate and progress HEP w/ special emphasis on functional core control and thoracolumbar mobility. Long term goals (6 weeks)  1) Pt will improve FOTO to at least 62.   2) Pt will sleep through the night in positioning of choosing 6/7 nights a week w/o waking due to pain.   3) Pt will perform two full weeks of normal recreation and exercise w/ pain in low back <4/10. 4) Pt will be independent and compliant w/ HEP in order to maximize functional benefit of skilled PT following d/c.         Plan  Plan details: HEP to start: see code    Patient would benefit from: skilled PT  Planned modality interventions: cryotherapy and thermotherapy: hydrocollator packs  Planned therapy interventions: abdominal trunk stabilization, activity modification, ADL retraining, manual therapy, massage, motor coordination training, neuromuscular re-education, patient education, therapeutic training, therapeutic exercise, therapeutic activities, stretching, strengthening, home exercise program, functional ROM exercises, gait training, balance, balance/weight bearing training and body mechanics training  Frequency: 2x week  Duration in visits: 12  Duration in weeks: 6  Treatment plan discussed with: patient        Subjective Evaluation    History of Present Illness  Mechanism of injury: Pt has been seen here for similar R sided low back pain ealier this year. He notes that he has had to do lots of driving lately, drove [employer] for a couple hours, danced all night, drove back over the past weekend. [place] pain on Monday morning () when waking up. Notes that he almost fell because pain was so intense. Wife helped massage back, felt better. Recently had joined a pool league, went on Tuesday and had more pain the next morning. Pt has trouble falling asleep s/t pain, wakes up s/t pain as well. Pt notes that R shoulder has been sore lately as well. Pt was going to gym 3-4x/week following last bout of PT, felt really good and wants to get back to this. Functional status below:   Quality of life: good    Patient Goals  Patient goals for therapy: increased strength, decreased pain, increased motion and return to sport/leisure activities  Patient goal: be able to move and exercise without pain!   Pain  Current pain rating: 3  At best pain ratin  At worst pain rating: 10  Location: midline low back, radiates towards R side, R side tailbone   Quality: sharp and radiating  Relieving factors: rest, ice and medications (massage from wife, movement, Tylenol )  Aggravating factors: lifting, running, overhead activity, stair climbing and standing (forward bending )  Progression: improved    Social Support  Steps to enter house: yes  Stairs in house: yes   Lives in: multiple-level home  Lives with: spouse    Employment status: not working (retired)        Objective     Concurrent Complaints  Negative for bladder dysfunction, bowel dysfunction and saddle (S4) numbness    Postural Observations  Seated posture: poor  Standing posture: fair  Correction of posture: makes symptoms better        Palpation     Additional Palpation Details  Pt is TTP and has increased tissue density through R side thoracolumbar PS and QL/piri    Neurological Testing     Sensation     Lumbar   Left   Intact: light touch    Right   Intact: light touch    Active Range of Motion     Additional Active Range of Motion Details  Flex: 75% w/ knee bend  Ext: 50% w/ slight pinch  SB R: 50%  SB L: 25%* on R side  Rot R: 50%  Rot L: 50%    *indicates pain    Strength/Myotome Testing     Additional Strength Details  LE Strength (R/L)    Hip  Flex 4/5* , 4/5  Ext 4/5 , 4/5  Abd 4/5 , 4/5  Add 4/5 , 4/5    Knee   Flex 4/5 , 4/5  Ext 4/5 , 4/5    Core Strength: at least 1+/5 - good carryover from previous sessions, did not test further limits today    *Indicates pain    Tests     Additional Tests Details  Manual lumbar traction: good relief R>L    Thoracolumbar joint mobility: hypomobile w/ min pain on R side testing     Functional Testing:   Sit<>stand: UE support on LE w/ fair glute drive, increased time to complete task    BW squat: at least 50%, further limits not tested    Stairs: TBA    GEEG: stretch in low back    PPU 75% range provides good stretch and relief, w/ OP increases pain - technique deferred Flowsheet Rows    Flowsheet Row Most Recent Value   PT/OT G-Codes    Current Score 37   Projected Score 62   FOTO information reviewed Yes             Precautions: standard     POC expires Auth Status Total   Visits  Start date  Expiration date PT/OT + Visit Limit? Co-Insurance    MEDICARE                                                  Visit/Unit Tracking  AUTH Status:  Date               Visits  Authed:  Used                Remaining                      Dummy Auth Tracking  1 2 3 4 5 6   7 8 9 10 11 12   13 14 15 16 17 18   19 20 21 22 23 24   25 26 27 28 29 30   31 32 33 34 35 36         Date (Visit #) IE 9/29 (1) (2) (3) (4) (5)   DTM and TPR        Lumbar mobs        Lumbar Traction                Exercise Diary              Ther Ex        Active w/u             HS/glute/piri/HF stretching  B HS and glute/piri w/ intermittent LAD x 8 mins           Mobility (LTR, open books, cat/cow) x10-15 LTR    GEGE x2-3 mins  PPU 2x8 total       Rows/ext        Hip stability                                                Neuro Re Ed        TrA progressions   isos, march x10-15            Bridge progressions  w/ ad sq x15           Squat progressions             Hip abd progressions             Balance                HEP and POC review  x8 min w/ progression review                                Ther Act             Stairs             Functional Transfers             Functional Lifting                                                                     Modalities              heat               ice               mechanical                                             from 9/29  Access Code: [email or web address]  URL: [email or web address]  Date: 09/29/2023  Prepared by: Mancil Salines    Exercises  - Supine Lower Trunk Rotation  - 1 x daily - 7 x weekly - 3 sets - 10 reps  - Prone Press Up On Elbows  - 1 x daily - 7 x weekly - 3 sets - 10 reps  - Prone Press Up  - 1 x daily - 7 x weekly - 3 sets - 10 reps  - Supine Posterior Pelvic Tilt  - 1 x daily - 7 x weekly - 3 sets - 10 reps  - Supine March  - 1 x daily - 7 x weekly - 3 sets - 10 reps  - Supine Bridge  - 1 x daily - 7 x weekly - 3 sets - 10 reps

## 2023-09-29 NOTE — TELEPHONE ENCOUNTER
The patient's wife called because the patient started physical therapy but the patient continues to have back pain. She wants Dr. Sathish Amador  to send him some anti-inflammatories. The patient is taking Tylenol but it is not working for him. Please contact the patient.

## 2023-10-03 ENCOUNTER — OFFICE VISIT (OUTPATIENT)
Dept: PHYSICAL THERAPY | Facility: CLINIC | Age: 68
End: 2023-10-03
Payer: MEDICARE

## 2023-10-03 DIAGNOSIS — G89.29 CHRONIC RIGHT-SIDED LOW BACK PAIN WITH BILATERAL SCIATICA: Primary | ICD-10-CM

## 2023-10-03 DIAGNOSIS — M54.42 CHRONIC RIGHT-SIDED LOW BACK PAIN WITH BILATERAL SCIATICA: Primary | ICD-10-CM

## 2023-10-03 DIAGNOSIS — M54.41 CHRONIC RIGHT-SIDED LOW BACK PAIN WITH BILATERAL SCIATICA: Primary | ICD-10-CM

## 2023-10-03 PROCEDURE — 97110 THERAPEUTIC EXERCISES: CPT

## 2023-10-03 PROCEDURE — 97112 NEUROMUSCULAR REEDUCATION: CPT

## 2023-10-03 NOTE — PROGRESS NOTES
Daily Note     Today's date: 10/3/2023  Patient name: Dileep Martins  : 1955  MRN: [id number]  Referring provider: No Casillas DO  Dx:   Encounter Diagnosis     ICD-10-CM    1. Chronic right-sided low back pain with bilateral sciatica  M54.41     M54.42     G89.29                      Subjective: Pt reports 3-4/10 pain to start session. Notes that he feels a bit better than last week. Objective: requires initial cueing for posterior chain engagement w/ TRX rows - corrects and maintains. PPU through full range w/o pain today      Assessment: Tolerated treatment well. Patient demonstrated fatigue post treatment and would benefit from continued PT. Does well w/ exercise progressions today. Good tolerance to strength progressions, will attempt to increase intensity of dynamic program as sx allow. Focus on techniques that can be replicated in gym. Plan: Continue per plan of care. prepare for gym program, carries, squats, leg press        Precautions: standard     POC expires Auth Status Total   Visits  Start date  Expiration date PT/OT + Visit Limit?  Co-Insurance    MEDICARE                                                  Visit/Unit Tracking  AUTH Status:  Date               Visits  Authed:  Used                Remaining                      Dummy Auth Tracking  1 2 3 4 5 6   7 8 9 10 11 12   13 14 15 16 17 18   19 20 21 22 23 24   25 26 27 28 29 30   31 32 33 34 35 36         Date (Visit #) IE  (1) 10/3 (2) (3) (4) (5)   DTM and TPR        Lumbar mobs        Lumbar Traction                Exercise Diary              Ther Ex        Active w/u    mid session 8 min lvl 4-5          HS/glute/piri/HF stretching  B HS and glute/piri w/ intermittent LAD x 8 mins  same x 5-6 mins         Mobility (LTR, open books, cat/cow) x10-15 LTR    GEGE x2-3 mins  PPU 2x8 total 2x10     x2-3 mins    2x8 total      Rows/ext        Hip stability                                                Neuro Re Ed TrA progressions   isos, march x10-15   isos, march, single leg ext 2x10    90/90 taps 2x8         Bridge progressions  w/ ad sq x15  2x10         Squat progressions             Hip abd progressions             pallof   22.5# 2x10 B      TRX squat and row  2x10 each      HEP and POC review  x8 min w/ progression review  Rev x 2-3 mins                              Ther Act             Stairs             Functional Transfers             Functional Lifting                                                                     Modalities              heat               ice               mechanical                                             from 9/29  Access Code: [email or web address]  URL: [email or web address]  Date: 09/29/2023  Prepared by: Galileo Bishop    Exercises  - Supine Lower Trunk Rotation  - 1 x daily - 7 x weekly - 3 sets - 10 reps  - Prone Press Up On Elbows  - 1 x daily - 7 x weekly - 3 sets - 10 reps  - Prone Press Up  - 1 x daily - 7 x weekly - 3 sets - 10 reps  - Supine Posterior Pelvic Tilt  - 1 x daily - 7 x weekly - 3 sets - 10 reps  - Supine March  - 1 x daily - 7 x weekly - 3 sets - 10 reps  - Supine Bridge  - 1 x daily - 7 x weekly - 3 sets - 10 reps

## 2023-10-05 ENCOUNTER — OFFICE VISIT (OUTPATIENT)
Dept: PHYSICAL THERAPY | Facility: CLINIC | Age: 68
End: 2023-10-05
Payer: MEDICARE

## 2023-10-05 DIAGNOSIS — M54.42 CHRONIC RIGHT-SIDED LOW BACK PAIN WITH BILATERAL SCIATICA: Primary | ICD-10-CM

## 2023-10-05 DIAGNOSIS — M54.41 CHRONIC RIGHT-SIDED LOW BACK PAIN WITH BILATERAL SCIATICA: Primary | ICD-10-CM

## 2023-10-05 DIAGNOSIS — G89.29 CHRONIC RIGHT-SIDED LOW BACK PAIN WITH BILATERAL SCIATICA: Primary | ICD-10-CM

## 2023-10-05 PROCEDURE — 97112 NEUROMUSCULAR REEDUCATION: CPT

## 2023-10-05 PROCEDURE — 97110 THERAPEUTIC EXERCISES: CPT

## 2023-10-05 NOTE — PROGRESS NOTES
Daily Note     Today's date: 10/5/2023  Patient name: Janet Guidry  : 1955  MRN: [id number]  Referring provider: Bolivar Miles DO  Dx:   Encounter Diagnosis     ICD-10-CM    1. Chronic right-sided low back pain with bilateral sciatica  M54.41     M54.42     G89.29                      Subjective: Pt reports no new complaints, felt good after last visit. Playing pool on Tuesday (10/3) night went well. Objective: requires cueing for proper hip hinge vs spinal flexion w/ dead lift vs Porfirio curl work - able to correct and maintain once cued. Assessment: Tolerated treatment well. Patient demonstrated fatigue post treatment and would benefit from continued PT. Good tolerance to exercise progressions. All techniques below were performed w/o pain through appropriate ranges. He was encouraged to return to gym tomorrow at reduced intensity. Will work this gym session into our next session here. Pt in agreement w/ plan. Plan: Continue per plan of care. return to gym activities. Precautions: standard     POC expires Auth Status Total   Visits  Start date  Expiration date PT/OT + Visit Limit?  Co-Insurance    MEDICARE                                                  Visit/Unit Tracking  AUTH Status:  Date               Visits  Authed:  Used                Remaining                      Dummy Auth Tracking  1 2 3 4 5 6   7 8 9 10 11 12   13 14 15 16 17 18   19 20 21 22 23 24   25 26 27 28 29 30   31 32 33 34 35 36         Date (Visit #) IE  (1) 10/3 (2) 10/5 (3) (4) (5)   DTM and TPR        Lumbar mobs        Lumbar Traction                Exercise Diary              Ther Ex        Active w/u    mid session 8 min lvl 4-5  no       HS/glute/piri/HF stretching  B HS and glute/piri w/ intermittent LAD x 8 mins  same x 5-6 mins  8 mins        Mobility (LTR, open books, cat/cow) x10-15 LTR    GEGE x2-3 mins  PPU 2x8 total 2x10     x2-3 mins    2x8 total x20        2x8 total    Lumbar PA glides x 2 mins total grade III     Rows/ext        Hip stability                                                Neuro Re Ed        TrA progressions   isos, march x10-15   isos, march, single leg ext 2x10    90/90 taps 2x8  isos and march x20       Bridge progressions  w/ ad sq x15  2x10  x20 w/ red tband     BKFO x20 B       Squat progressions             Hip abd progressions             pallof   22.5# 2x10 B 17.5# x30 B     TRX squat and row  2x10 each 3x10 each B     HEP and POC review  x8 min w/ progression review  Rev x 2-3 mins x2-3 mins     Porfirio curl   18-26# KB x20 total     KB squat   To 8" step 26# 3x01             Ther Act             Stairs             Functional Transfers             Functional Lifting                                                                     Modalities              heat               ice               mechanical                                             from 9/29  Access Code: [email or web address]  URL: [email or web address]  Date: 09/29/2023  Prepared by: Julio Santana    Exercises  - Supine Lower Trunk Rotation  - 1 x daily - 7 x weekly - 3 sets - 10 reps  - Prone Press Up On Elbows  - 1 x daily - 7 x weekly - 3 sets - 10 reps  - Prone Press Up  - 1 x daily - 7 x weekly - 3 sets - 10 reps  - Supine Posterior Pelvic Tilt  - 1 x daily - 7 x weekly - 3 sets - 10 reps  - Supine March  - 1 x daily - 7 x weekly - 3 sets - 10 reps  - Supine Bridge  - 1 x daily - 7 x weekly - 3 sets - 10 reps

## 2023-10-10 ENCOUNTER — APPOINTMENT (OUTPATIENT)
Dept: LAB | Facility: CLINIC | Age: 68
End: 2023-10-10
Payer: MEDICARE

## 2023-10-10 ENCOUNTER — OFFICE VISIT (OUTPATIENT)
Dept: PHYSICAL THERAPY | Facility: CLINIC | Age: 68
End: 2023-10-10
Payer: MEDICARE

## 2023-10-10 DIAGNOSIS — E78.2 MIXED HYPERLIPIDEMIA: ICD-10-CM

## 2023-10-10 DIAGNOSIS — M54.42 CHRONIC RIGHT-SIDED LOW BACK PAIN WITH BILATERAL SCIATICA: Primary | ICD-10-CM

## 2023-10-10 DIAGNOSIS — I1A.0 RESISTANT HYPERTENSION: ICD-10-CM

## 2023-10-10 DIAGNOSIS — G89.29 CHRONIC RIGHT-SIDED LOW BACK PAIN WITH BILATERAL SCIATICA: Primary | ICD-10-CM

## 2023-10-10 DIAGNOSIS — M54.41 CHRONIC RIGHT-SIDED LOW BACK PAIN WITH BILATERAL SCIATICA: Primary | ICD-10-CM

## 2023-10-10 PROCEDURE — 97110 THERAPEUTIC EXERCISES: CPT

## 2023-10-10 PROCEDURE — 80061 LIPID PANEL: CPT

## 2023-10-10 PROCEDURE — 83704 LIPOPROTEIN BLD QUAN PART: CPT

## 2023-10-10 PROCEDURE — 97112 NEUROMUSCULAR REEDUCATION: CPT

## 2023-10-10 PROCEDURE — 36415 COLL VENOUS BLD VENIPUNCTURE: CPT

## 2023-10-10 NOTE — PROGRESS NOTES
Daily Note     Today's date: 10/10/2023  Patient name: Berta Vasquez  : 1955  MRN: [id number]  Referring provider: Chris Ferrell DO  Dx:   Encounter Diagnosis     ICD-10-CM    1. Chronic right-sided low back pain with bilateral sciatica  M54.41     M54.42     G89.29                      Subjective: Pt reports no new complaints, woke up more stiff this morning but feels good overall. Encouraged by progress. Objective: Porfirio coffman deferred due to discomfort throughout forward flexion routine today - added MICHELLE to good effect w/ overall decrease in sx. Assessment: Tolerated treatment well. Patient demonstrated fatigue post treatment and would benefit from continued PT. Does well w/ exercise progressions despite increased stiffness today. Focused on improving mobility vs increasing intensity of strength program today. Will increase intensity of exercise progressions barring setback at next visit. Reviewed mobility routine prior to pool tourney tonight. Good understanding. Plan: Continue per plan of care. increase intensity of loaded strength work as sx allow     Precautions: standard     POC expires Auth Status Total   Visits  Start date  Expiration date PT/OT + Visit Limit?  Co-Insurance    MEDICARE                                                  Date (Visit #) IE  (1) 10/3 (2) 10/5 (3) 10/10 (4) (5)   DTM and TPR        Lumbar mobs        Lumbar Traction                Exercise Diary              Ther Ex        Active w/u    mid session 8 min lvl 4-5  no  recumbent pre 8 min lvl 3-4     HS/glute/piri/HF stretching  B HS and glute/piri w/ intermittent LAD x 8 mins  same x 5-6 mins  8 mins   x7-8 mins total      Mobility (LTR, open books, cat/cow) x10-15 LTR    GEGE x2-3 mins  PPU 2x8 total 2x10     x2-3 mins    2x8 total x20        2x8 total    Lumbar PA glides x 2 mins total grade III x20    x3-4 mins    2x8 total    x3-4 min grade III    Rows/ext    Elephant stretch at table x 3-4 mins    Hip stability    MICHELLE w/ dowel OP x 20 total                                            Neuro Re Ed        TrA progressions   isos, march x10-15   isos, march, single leg ext 2x10    90/90 taps 2x8  isos and march x20  isos to 90/90 taps w/ 18# KB overhead hold 4x5, overhead pull through 2x10     Bridge progressions  w/ ad sq x15  2x10  x20 w/ red tband     BKFO x20 B  2x10 no sq     Squat progressions             Hip abd progressions             pallof   22.5# 2x10 B 17.5# x30 B     TRX squat and row  2x10 each 3x10 each B     HEP and POC review  x8 min w/ progression review  Rev x 2-3 mins x2-3 mins x2-3 mins     Porfirio curl   18-26# KB x20 total Rev    No weight x10    KB squat   To 8" step 26# 3x10             Ther Act             Stairs             Functional Transfers             Functional Lifting                                                                     Modalities              heat               ice               mechanical                                             from 9/29  Access Code: [email or web address]  URL: [email or web address]  Date: 09/29/2023  Prepared by: Joycelyn Carrero    Exercises  - Supine Lower Trunk Rotation  - 1 x daily - 7 x weekly - 3 sets - 10 reps  - Prone Press Up On Elbows  - 1 x daily - 7 x weekly - 3 sets - 10 reps  - Prone Press Up  - 1 x daily - 7 x weekly - 3 sets - 10 reps  - Supine Posterior Pelvic Tilt  - 1 x daily - 7 x weekly - 3 sets - 10 reps  - Supine March  - 1 x daily - 7 x weekly - 3 sets - 10 reps  - Supine Bridge  - 1 x daily - 7 x weekly - 3 sets - 10 reps

## 2023-10-12 ENCOUNTER — OFFICE VISIT (OUTPATIENT)
Dept: PHYSICAL THERAPY | Facility: CLINIC | Age: 68
End: 2023-10-12
Payer: MEDICARE

## 2023-10-12 DIAGNOSIS — G89.29 CHRONIC RIGHT-SIDED LOW BACK PAIN WITH BILATERAL SCIATICA: Primary | ICD-10-CM

## 2023-10-12 DIAGNOSIS — M54.42 CHRONIC RIGHT-SIDED LOW BACK PAIN WITH BILATERAL SCIATICA: Primary | ICD-10-CM

## 2023-10-12 DIAGNOSIS — M54.41 CHRONIC RIGHT-SIDED LOW BACK PAIN WITH BILATERAL SCIATICA: Primary | ICD-10-CM

## 2023-10-12 LAB
CHOLEST SERPL-MCNC: 242 MG/DL (ref 100–199)
HDL SERPL-SCNC: 22.1 UMOL/L
HDLC SERPL-MCNC: 37 MG/DL
LDL SERPL QN: 1253 NMOL/L
LDL SERPL QN: 20.2 NM
LDL SERPL-SCNC: 2326 NMOL/L
LDLC SERPL CALC-MCNC: 181 MG/DL (ref 0–99)
LP-IR SCORE SERPL: 53
TRIGL SERPL-MCNC: 133 MG/DL (ref 0–149)

## 2023-10-12 PROCEDURE — 97112 NEUROMUSCULAR REEDUCATION: CPT

## 2023-10-12 PROCEDURE — 97110 THERAPEUTIC EXERCISES: CPT

## 2023-10-12 NOTE — PROGRESS NOTES
Daily Note     Today's date: 10/12/2023  Patient name: Janet Guidry  : 1955  MRN: [id number]  Referring provider: Bolivar Miles DO  Dx:   Encounter Diagnosis     ICD-10-CM    1. Chronic right-sided low back pain with bilateral sciatica  M54.41     M54.42     G89.29                      Subjective: Pt reports some stiffness today, not too bad overall. Feels he is improving. Objective: requires cueing for full range and proper spinal flexion during end range [facility], corrects and is able to maintain. Assessment: Tolerated treatment well. Patient demonstrated fatigue post treatment, exhibited good technique with therapeutic exercises, and would benefit from continued PT. Does well w/ exercise progressions, fatig      Plan: Continue per plan of care. Precautions: standard     POC expires Auth Status Total   Visits  Start date  Expiration date PT/OT + Visit Limit?  Co-Insurance    MEDICARE                                                  Date (Visit #) IE  (1) 10/3 (2) 10/5 (3) 10/10 (4) 10/12 (5)      DTM and TPR           Lumbar mobs           Lumbar Traction                      Exercise Diary                 Ther Ex           Active w/u    mid session 8 min lvl 4-5  no  recumbent pre 8 min lvl 3-4  upright pre 7 min lvl 3-4       HS/glute/piri/HF stretching  B HS and glute/piri w/ intermittent LAD x 8 mins  same x 5-6 mins  8 mins   x7-8 mins total  x6-7 mins      Mobility (LTR, open books, cat/cow) x10-15 LTR    GEGE x2-3 mins  PPU 2x8 total 2x10     x2-3 mins    2x8 total x20        2x8 total    Lumbar PA glides x 2 mins total grade III x20    x3-4 mins    2x8 total    x3-4 min grade III X20 total    X2-3 mins    2x8, first set w/ OP      Same x2 mins      Rows/ext    Elephant stretch at table x 3-4 mins X3-4 mins       Hip stability    MICHELLE w/ dowel OP x 20 total                                                              Neuro Re Ed           TrA progressions   isos, march x10-15   isos, march, single leg ext 2x10    90/90 taps 2x8  isos and march x20  isos to 90/90 taps w/ 18# KB overhead hold 4x5, overhead pull through 2x10  rev       Bridge progressions  w/ ad sq x15  2x10  x20 w/ red tband     BKFO x20 B  2x10 no sq  3x10 red tband, BKFO red x20      Squat progressions                Hip abd progressions                pallof   22.5# 2x10 B 17.5# x30 B        TRX squat and row  2x10 each 3x10 each B  rev      HEP and POC review  x8 min w/ progression review  Rev x 2-3 mins x2-3 mins x2-3 mins  rev      Porfirio curl   18-26# KB x20 total Rev    No weight x10 18# to 10" step x10, to level ground 2x10      KB squat   To 8" step 26# 3x10             Seated good mornings 27.5# x25 total      Ther Act                Stairs                Functional Transfers                Functional Lifting                                                                                    Modalities                 heat                  ice                  mechanical                                                      from 9/29  Access Code: [email or web address]  URL: [email or web address]  Date: 09/29/2023  Prepared by: Xiomara Pozo    Exercises  - Supine Lower Trunk Rotation  - 1 x daily - 7 x weekly - 3 sets - 10 reps  - Prone Press Up On Elbows  - 1 x daily - 7 x weekly - 3 sets - 10 reps  - Prone Press Up  - 1 x daily - 7 x weekly - 3 sets - 10 reps  - Supine Posterior Pelvic Tilt  - 1 x daily - 7 x weekly - 3 sets - 10 reps  - Supine March  - 1 x daily - 7 x weekly - 3 sets - 10 reps  - Supine Bridge  - 1 x daily - 7 x weekly - 3 sets - 10 reps

## 2023-10-13 ENCOUNTER — OFFICE VISIT (OUTPATIENT)
Dept: CARDIOLOGY CLINIC | Facility: CLINIC | Age: 68
End: 2023-10-13
Payer: MEDICARE

## 2023-10-13 VITALS
BODY MASS INDEX: 25.88 KG/M2 | WEIGHT: 161 LBS | HEIGHT: 66 IN | OXYGEN SATURATION: 98 % | HEART RATE: 61 BPM | SYSTOLIC BLOOD PRESSURE: 144 MMHG | DIASTOLIC BLOOD PRESSURE: 82 MMHG

## 2023-10-13 DIAGNOSIS — E78.2 MIXED HYPERLIPIDEMIA: ICD-10-CM

## 2023-10-13 DIAGNOSIS — Z95.1 HISTORY OF CORONARY ARTERY BYPASS GRAFT: ICD-10-CM

## 2023-10-13 DIAGNOSIS — Z95.818 STATUS POST PLACEMENT OF IMPLANTABLE LOOP RECORDER: ICD-10-CM

## 2023-10-13 DIAGNOSIS — I25.10 CORONARY ARTERY DISEASE INVOLVING NATIVE HEART, UNSPECIFIED VESSEL OR LESION TYPE, UNSPECIFIED WHETHER ANGINA PRESENT: ICD-10-CM

## 2023-10-13 DIAGNOSIS — D86.85 CARDIAC SARCOIDOSIS: Primary | ICD-10-CM

## 2023-10-13 DIAGNOSIS — I1A.0 RESISTANT HYPERTENSION: ICD-10-CM

## 2023-10-13 PROCEDURE — 93000 ELECTROCARDIOGRAM COMPLETE: CPT | Performed by: INTERNAL MEDICINE

## 2023-10-13 PROCEDURE — 99214 OFFICE O/P EST MOD 30 MIN: CPT | Performed by: INTERNAL MEDICINE

## 2023-10-13 RX ORDER — ATORVASTATIN CALCIUM 80 MG/1
80 TABLET, FILM COATED ORAL DAILY
Qty: 90 TABLET | Refills: 3 | Status: SHIPPED | OUTPATIENT
Start: 2023-10-13

## 2023-10-13 NOTE — PROGRESS NOTES
[facility]  [address] [place] [address]. [address]   Formerly [facility], [address] [facility]  Cardiology Consultation    Marielena Ramirez  [id number]  1955      Consult for:CABG/Sarcoidosis  Appreciate consult by: Anjum Better, DO    1. Cardiac sarcoidosis  POCT ECG      2. Coronary artery disease involving native heart, unspecified vessel or lesion type, unspecified whether angina present  POCT ECG      3. Mixed hyperlipidemia  POCT ECG    atorvastatin (LIPITOR) 80 mg tablet      4. Status post placement of implantable loop recorder  POCT ECG      5. History of coronary artery bypass graft  POCT ECG    atorvastatin (LIPITOR) 80 mg tablet      6. Resistant hypertension  POCT ECG         Discussion/Summary:   Palpitations/previous bradycardia/suspected Cardiac Sarcoidosis- reviewed patient's MRI and PET scan report. Echo 2D with normal ejection fraction and mild concentric hypertrophy. S/P ICD placement. He could not tolerate methotextrate. He is also on the bactrim + folic acid. He would like to establish care locally. Will have him follow-up with our advanced cardiology    Coronary artery disease status post prior bypass grafting- his LDL is not at goal. Last echo 2D with normal ejection fraction wall motion. Restart atorvastatin 80mg and aspirin. Palpitations- metoprolol 150mg currently    Hypertension- controlled on hydrochlorothiazide 12.5 mg and amlodipine 10 mg every evening. Discussed with patient by wearing compression when sitting standing    PTSD- currently on SSRI    HPI:   63-year-old disabled  history of coronary artery bypass grafting 2 vessel in 2007, hypertension with recent workup at an outside not work for episodes of tachy-holland with advanced imaging suggestive of a inflammatory and infiltrative process. He has plans for a defibrillator placement. He still has periodic tachycardia episodes. He denies having syncope. He states not being as active currently.   Denies having any severe chest discomfort. There is no history of atrial fibrillation. He is compliant with his medications. He denies having any fevers or chills. Denies any recent viral symptoms. Denies having any nausea vomiting diarrhea. 10/7: Denies dizziness or chest pain. Currently followed at [facility] advanced heart failure. He was not able to tolerate methotrexate. He is currently on prednisone. His pacemaker pocket is well healed. 4/28/23:  Still with some shortness of breath. Denies having chest heaviness. Denies having any device firing. We discussed about his lipid panel. 10/13/2023: He was having increased palpitations and his metoprolol was increased. We reviewed through his abnormal blood work. He unfortunately ran out of his atorvastatin. He is currently intermittently atrial paced with occasional PVCs. He joined the gym- lower back pain/no dyspnea    PMH  CABG- 2 vessel Lars iSegel- severe stabbing pain    Social Hx  - [facility]  Disabled [address] were in [facility]  Not much physical activity  Former smoking- 10 years-- 1-2 cig light- 2 years a pack    Family Hx  Sister- 79- couple of heart attack.  No hx of pacemaker    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[employer]    Past Medical History:   Diagnosis Date    Alcohol dependence, uncomplicated ([address]) 29/50/6598    Coronary artery disease     Hyperlipemia     Hypertension     Irregular heart beat     bradycardia     Social History     Socioeconomic History    Marital status: /Civil Union     Spouse name: Not on file    Number of children: Not on file    Years of education: Not on file    Highest education level: Not on file   Occupational History    Not on file   Tobacco Use    Smoking status: Never    Smokeless tobacco: Never   Vaping Use    Vaping Use: Never used   Substance and Sexual Activity    Alcohol use: Never    Drug use: Never    Sexual activity: Not on file   Other Topics Concern    Not on file   Social History Narrative    Not on file     Social Determinants of Health     Financial Resource Strain: Low Risk  (10/21/2022)    Overall Financial Resource Strain (CARDIA)     Difficulty of Paying Living Expenses: Not hard at all   Food Insecurity: Not on file   Transportation Needs: No Transportation Needs (10/21/2022)    PRAPARE - Transportation     Lack of Transportation (Medical): No     Lack of Transportation (Non-Medical):  No   Physical Activity: Not on file   Stress: Not on file   Social Connections: Not on file   Intimate Partner Violence: Not on file   Housing Stability: Not on file      Family History   Problem Relation Age of Onset    Arthritis Mother     Leukemia Maternal Aunt     Mental illness Neg Hx      Past Surgical History:   Procedure Laterality Date    ANTERIOR CRUCIATE LIGAMENT REPAIR Left 2001    CARDIAC LOOP RECORDER  03/2022    MD from [facility] possible pacer placement    CARDIAC SURGERY  2007    CABG x2       Current Outpatient Medications:     amLODIPine (NORVASC) 10 mg tablet, Take 10 mg by mouth every evening  , Disp: , Rfl:     aspirin (ECOTRIN LOW STRENGTH) 81 mg EC tablet, TAKE ONE TABLET BY MOUTH DAILY FOR THE HEART., Disp: , Rfl:     atorvastatin (LIPITOR) 80 mg tablet, Take 1 tablet (80 mg total) by mouth in the morning, Disp: 90 tablet, Rfl: 3    buPROPion (WELLBUTRIN XL) 150 mg 24 hr tablet, Take 150 mg by mouth  , Disp: , Rfl:     cyclobenzaprine (FLEXERIL) 10 mg tablet, Take 10 mg by mouth daily as needed  , Disp: , Rfl:     escitalopram (LEXAPRO) 20 mg tablet, Take 20 mg by mouth  , Disp: , Rfl:     Flovent  MCG/ACT inhaler, , Disp: , Rfl:     fluticasone (FLONASE) 50 mcg/act nasal spray, as needed, Disp: , Rfl:     folic acid (FOLVITE) 1 mg tablet, 1 mg, Disp: , Rfl:     furosemide (LASIX) 20 mg tablet, if needed, Disp: , Rfl:     hydrochlorothiazide (HYDRODIURIL) 25 mg tablet, Take 12.5 mg by mouth daily, Disp: , Rfl:     levalbuterol (XOPENEX HFA) 45 mcg/act inhaler, Inhale every 6 (six) hours as needed, Disp: , Rfl:     lidocaine (LIDODERM) 5 %, APPLY 1 PATCH ON SKIN DAILY FOR PAIN. DO NOT WEAR FOR LONGER THAN 12 HOURS IN ANY 24 HOUR PERIOD. [address] YOUR HANDS AFTER APPLYING A PATCH., Disp: , Rfl:     losartan (COZAAR) 50 mg tablet, Take 50 mg by mouth daily Patient is taking one pill per day of 50mg., Disp: , Rfl:     melatonin 3 mg, Take 3 mg by mouth, Disp: , Rfl:     meloxicam (MOBIC) 15 mg tablet, Take 1 tablet (15 mg total) by mouth daily As needed, Disp: 30 tablet, Rfl: 0    methylPREDNISolone 4 MG tablet therapy pack, Use as directed on package, Disp: 21 each, Rfl: 0    metoprolol succinate (TOPROL-XL) 50 mg 24 hr tablet, Take 150 mg by mouth every morning, Disp: , Rfl:     montelukast (SINGULAIR) 10 mg tablet, Take 10 mg by mouth, Disp: , Rfl:     omeprazole (PriLOSEC) 20 mg delayed release capsule, take 1 capsule by mouth once daily, Disp: 60 capsule, Rfl: 1    prazosin (MINIPRESS) 5 mg capsule, Take 5 mg by mouth daily at bedtime, Disp: , Rfl:     sodium chloride (OCEAN) 0.65 % nasal spray, 1 spray into each nostril 4 (four) times a day as needed, Disp: , Rfl:     Spacer/Aero-Holding Chambers (BreatheRite Andrew Spacer Adult) MISC, Use 1 each, Disp: , Rfl:     Spacer/Aero-Holding Chambers (OptiChamber Elicia) MISC, , Disp: , Rfl:     fluticasone (Flovent HFA) 110 MCG/ACT inhaler, Inhale 2 puffs 2 (two) times a day Rinse mouth after use., Disp: 12 g, Rfl: 0    predniSONE 10 mg tablet, Take 30 mg by mouth daily (Patient not taking: Reported on 7/25/2023), Disp: , Rfl:   Allergies   Allergen Reactions    Bee Pollen Other (See Comments)     Itchy eyes/nose, sneezing, clogged ears, increased cough/chest tightness. Methotrexate Other (See Comments)     Felt unwell, unsteady    Moxifloxacin GI Intolerance     W/ GI bleed      Pollen Extract Other (See Comments)     Itchy eyes/nose, sneezing, clogged ears, increased cough/chest tightness.     Lisinopril Cough     Vitals:    10/13/23 1622   BP: 144/82   BP Location: Right arm   Patient Position: Sitting   Cuff Size: Standard   Pulse: 61   SpO2: 98%   Weight: 73 kg (161 lb)   Height: 5' 6" (1.676 m)       Review of Systems:   Review of Systems   Respiratory:  Negative for chest tightness. Cardiovascular:  Positive for palpitations. Vitals:    10/13/23 1622   BP: 144/82   BP Location: Right arm   Patient Position: Sitting   Cuff Size: Standard   Pulse: 61   SpO2: 98%   Weight: 73 kg (161 lb)   Height: 5' 6" (1.676 m)     Physical Examination:   Physical Exam  Constitutional:       General: He is not in acute distress. Appearance: He is well-developed. He is not diaphoretic. HENT:      Head: Normocephalic and atraumatic. Right Ear: External ear normal.      Left Ear: External ear normal.   Eyes:      General: No scleral icterus. Right eye: No discharge. Left eye: No discharge. Conjunctiva/sclera: Conjunctivae normal.      Pupils: Pupils are equal, round, and reactive to light. Neck:      Thyroid: No thyromegaly. Vascular: No JVD. Trachea: No tracheal deviation. Cardiovascular:      Rate and Rhythm: Normal rate and regular rhythm. Heart sounds: Murmur heard. No friction rub. Gallop present. Comments: ICD well-healed  Pulmonary:      Effort: Pulmonary effort is normal. No respiratory distress. Breath sounds: Normal breath sounds. No stridor. No wheezing or rales. Chest:      Chest wall: No tenderness. Abdominal:      General: Bowel sounds are normal. There is no distension. Palpations: Abdomen is soft. There is no mass. Tenderness: There is no abdominal tenderness. There is no guarding or rebound. Musculoskeletal:         General: No tenderness or deformity. Normal range of motion. Cervical back: Normal range of motion and neck supple. Skin:     General: Skin is warm and dry. Coloration: Skin is not pale. Findings: No erythema or rash. Neurological:      Mental Status: He is alert and oriented to person, place, and time. Cranial Nerves: No cranial nerve deficit. Motor: No abnormal muscle tone. Coordination: Coordination normal.      Deep Tendon Reflexes: Reflexes are normal and symmetric. Reflexes normal.   Psychiatric:         Behavior: Behavior normal.         Thought Content: Thought content normal.         Judgment: Judgment normal.         Labs:     Lab Results   Component Value Date    WBC 7.86 04/24/2023    HGB 12.6 04/24/2023    HCT 40.4 04/24/2023    MCV 85 04/24/2023    RDW 13.4 04/24/2023     04/24/2023     BMP:  Lab Results   Component Value Date    SODIUM 143 04/24/2023    K 5.0 04/24/2023     (H) 04/24/2023    CO2 25 04/24/2023    BUN 37 (H) 04/24/2023    CREATININE 1.20 04/24/2023    GLUC 106 09/26/2022    GLUF 126 (H) 04/24/2023    CALCIUM 10.2 (H) 04/24/2023    CORRECTEDCA 9.9 10/17/2022    EGFR 62 04/24/2023     LFT:  Lab Results   Component Value Date    AST 20 04/24/2023    ALT 34 04/24/2023    ALKPHOS 82 04/24/2023    TP 7.4 04/24/2023    ALB 3.7 04/24/2023      Lipid Profile:   Lab Results   Component Value Date    CHOLESTEROL 242 (H) 10/10/2023    HDL 37 (L) 10/10/2023    LDLCALC 181 (H) 10/10/2023    TRIG 126 04/24/2023     Lab Results   Component Value Date    CHOLESTEROL 242 (H) 10/10/2023    CHOLESTEROL 152 04/24/2023       Imaging & Testing   I have personally reviewed pertinent reports. Cardiac Testing   Sarcoidosis:   1. Solitary, small area of mildly increased FDG uptake in basal lateral wall suggesting active inflammation. The other sites that showed late gadolinium enhancement that are without abnormal FDG activity are likely fibrotic. 2.  Small subpleural nodule associated with right major fissure, too small for metabolic evaluation. Nonspecific. Continued dedicated CT followup in 3-6 months recommended, as clinically indicated. 3.  No additional FDG avid tissue. Cardiac MRI:  1. The LV is normal in size with borderline systolic function; LVEF 25%. 2. RV is mildly dilated (RVEDVI of 91 ml/sq m) with mildly diminished systolic function (RVEF of 37%). 3. Patchy areas of LGE at the basal septum at the superior and inferior LV - RV junction and mid LV inferior LV - RV junction, that could represent nonspecific myocardial fibrosis. 4. 2.8 x 2.2 cm enhancing lesion in the liver that is incompletely evaluated on this cardiac focussed examination. Recommend dedicated liver MRI for further evaluation. CT Chest 2020: IMPRESSION:   1. Mild widespread bronchiectasis. 2.  No significant bronchial wall thickening or air trapping. 3.  Nonspecific pulmonary nodule measuring up to 5 x 4 mm. 4.  Nonspecific hypodense right hepatic lobe 3 cm lesion. 1. No evidence of myocardial inflammation/sarcoidosis. 2. Stable right upper lobe perifissural nodule, below resolution of PET. 3. New asymmetric activity at the left adrenal gland, correlate to MRI. 4. New FDG-avid focus at the anterior aspect of the left testis, correlate to ultrasound. 5. New mild diffuse activity throughout the thyroid gland, probably related to thyroiditis. 6. Stable mildly FDG-avid bilateral hilar foci, stable mild FDG-avidity at the gastroesophageal junction, stable mild FDG-avidity along the median sternotomy, probably inflammatory. 7. Stable heterogeneous activity throughout the osseous structures including stable mildly FDG-avid focus in the proximal right femur without CT correlate, possibly. EKG: Personally reviewed. Normal sinus rhythm bifascicular heart block no acute ST changes  Atrial paced rhythm with periodic pvc    Judi Lovelace MD [address] [place] [address]  [phone]  Please call with any questions or suggestions    Counseling :  A description of the counselin minutes spent discussing about her sarcoidosis workup.   Reviewing prior records with patient and family  Goals and Barriers:  Patient's ability to self care:  Medication side effect reviewed with patient in detail and all their questions answered. "Portions of the record may have been created with voice recognition software. Occasional wrong word or "sound a like" substitutions may have occurred due to the inherent limitations of voice recognition software. Read the chart carefully and recognize, using context, where substitutions have occurred.  Please call if you have any questions. "

## 2023-10-17 ENCOUNTER — OFFICE VISIT (OUTPATIENT)
Dept: PHYSICAL THERAPY | Facility: CLINIC | Age: 68
End: 2023-10-17
Payer: MEDICARE

## 2023-10-17 DIAGNOSIS — M54.41 CHRONIC RIGHT-SIDED LOW BACK PAIN WITH BILATERAL SCIATICA: Primary | ICD-10-CM

## 2023-10-17 DIAGNOSIS — M54.42 CHRONIC RIGHT-SIDED LOW BACK PAIN WITH BILATERAL SCIATICA: Primary | ICD-10-CM

## 2023-10-17 DIAGNOSIS — G89.29 CHRONIC RIGHT-SIDED LOW BACK PAIN WITH BILATERAL SCIATICA: Primary | ICD-10-CM

## 2023-10-17 PROCEDURE — 97112 NEUROMUSCULAR REEDUCATION: CPT

## 2023-10-17 PROCEDURE — 97110 THERAPEUTIC EXERCISES: CPT

## 2023-10-17 NOTE — PROGRESS NOTES
Daily Note     Today's date: 10/17/2023  Patient name: Baron Dowling  : 1955  MRN: [id number]  Referring provider: Yesenia Victoria DO  Dx:   Encounter Diagnosis     ICD-10-CM    1. Chronic right-sided low back pain with bilateral sciatica  M54.41     M54.42     G89.29                      Subjective: Pt reports 2/10 pain today. Feels pretty good overall. Encouraged by progress. Objective: PPU full and pain free today. Assessment: Tolerated treatment well. Patient demonstrated fatigue post treatment and would benefit from continued PT. Does well w/ exercise progressions. Will benefit from progressive increases in resistance barring setback. Pt encouraged w/ progress. Plan: Continue per plan of care. Loaded progressions as able     Precautions: standard     POC expires Auth Status Total   Visits  Start date  Expiration date PT/OT + Visit Limit?  Co-Insurance    MEDICARE                                                  Date (Visit #) IE  (1) 10/3 (2) 10/5 (3) 10/10 (4) 10/12 (5) 10/17 (6)      DTM and TPR           Lumbar mobs           Lumbar Traction                      Exercise Diary                 Ther Ex           Active w/u    mid session 8 min lvl 4-5  no  recumbent pre 8 min lvl 3-4  upright pre 7 min lvl 3-4  Pre 6 min lvl 3-4      HS/glute/piri/HF stretching  B HS and glute/piri w/ intermittent LAD x 8 mins  same x 5-6 mins  8 mins   x7-8 mins total  x6-7 mins X5-6 mins     Mobility (LTR, open books, cat/cow) x10-15 LTR    GEGE x2-3 mins  PPU 2x8 total 2x10     x2-3 mins    2x8 total x20        2x8 total    Lumbar PA glides x 2 mins total grade III x20    x3-4 mins    2x8 total    x3-4 min grade III X20 total    X2-3 mins    2x8, first set w/ OP      Same x2 mins X10-15     X2 mins    2x8 full, one set w/ OP    X2-3 mins      Rows/ext    Elephant stretch at table x 3-4 mins X3-4 mins  X3-4 mins      Hip stability    MICHELLE w/ dowel OP x 20 total  rev Neuro Re Ed           TrA progressions   isos, march x10-15   isos, march, single leg ext 2x10    90/90 taps 2x8  isos and march x20  isos to 90/90 taps w/ 18# KB overhead hold 4x5, overhead pull through 2x10  rev  Isos x 20     Bridge progressions  w/ ad sq x15  2x10  x20 w/ red tband     BKFO x20 B  2x10 no sq  3x10 red tband, BKFO red x20 Red tband x 20     Squat progressions                Hip abd progressions                pallof   22.5# 2x10 B 17.5# x30 B        TRX squat and row  2x10 each 3x10 each B  rev      HEP and POC review  x8 min w/ progression review  Rev x 2-3 mins x2-3 mins x2-3 mins  rev X2-3 mins      Porfirio curl   18-26# KB x20 total Rev    No weight x10 18# to 10" step x10, to level ground 2x10 Reg to 10" step x 8 w/ 18# KB, to ground x 8     KB squat   To 8" step 26# 3x10   TRX squat reg x10, split x10, lat x10          Seated good mornings 27.5# x25 total      Ther Act                Stairs                Functional Transfers                Functional Lifting                                                                                    Modalities                 heat                  ice                  mechanical                                                      from 9/29  Access Code: [email or web address]  URL: [email or web address]  Date: 09/29/2023  Prepared by: Daryle Minks    Exercises  - Supine Lower Trunk Rotation  - 1 x daily - 7 x weekly - 3 sets - 10 reps  - Prone Press Up On Elbows  - 1 x daily - 7 x weekly - 3 sets - 10 reps  - Prone Press Up  - 1 x daily - 7 x weekly - 3 sets - 10 reps  - Supine Posterior Pelvic Tilt  - 1 x daily - 7 x weekly - 3 sets - 10 reps  - Supine March  - 1 x daily - 7 x weekly - 3 sets - 10 reps  - Supine Bridge  - 1 x daily - 7 x weekly - 3 sets - 10 reps

## 2023-10-19 ENCOUNTER — OFFICE VISIT (OUTPATIENT)
Dept: PHYSICAL THERAPY | Facility: CLINIC | Age: 68
End: 2023-10-19
Payer: MEDICARE

## 2023-10-19 DIAGNOSIS — G89.29 CHRONIC RIGHT-SIDED LOW BACK PAIN WITH BILATERAL SCIATICA: Primary | ICD-10-CM

## 2023-10-19 DIAGNOSIS — M54.41 CHRONIC RIGHT-SIDED LOW BACK PAIN WITH BILATERAL SCIATICA: Primary | ICD-10-CM

## 2023-10-19 DIAGNOSIS — M54.42 CHRONIC RIGHT-SIDED LOW BACK PAIN WITH BILATERAL SCIATICA: Primary | ICD-10-CM

## 2023-10-19 PROCEDURE — 97112 NEUROMUSCULAR REEDUCATION: CPT

## 2023-10-19 PROCEDURE — 97110 THERAPEUTIC EXERCISES: CPT

## 2023-10-19 NOTE — PROGRESS NOTES
Daily Note     Today's date: 10/19/2023  Patient name: Cass Keating  : 1955  MRN: [id number]  Referring provider: Abad Rosado DO  Dx:   Encounter Diagnosis     ICD-10-CM    1. Chronic right-sided low back pain with bilateral sciatica  M54.41     M54.42     G89.29                      Subjective: Pt reports 2/10 to start, more soreness than pain to start. Playing pool went well on 10/17, did not have pain throughout. Objective: requires cueing w/ suitcase marches to promote proper upright posture throughout, fatigues quickly and has multiple slight LOB that are easily self corrected. Assessment: Tolerated treatment well. Patient demonstrated fatigue post treatment and would benefit from continued PT. Does well w/ exercise progressions, fatigue but no increase in pain by end of session. Continues to tolerate strength progressions well. Will benefit from more aggressive strength challenges barring setback, plan to d/c in 2 weeks barring setback. Plan: Continue per plan of care. Review gym program if able      Precautions: standard     POC expires Auth Status Total   Visits  Start date  Expiration date PT/OT + Visit Limit?  Co-Insurance    MEDICARE                                                  Date (Visit #) IE  (1) 10/3 (2) 10/5 (3) 10/10 (4) 10/12 (5) 10/17 (6)  10/19 (7)    DTM and TPR           Lumbar mobs           Lumbar Traction                      Exercise Diary                 Ther Ex           Active w/u    mid session 8 min lvl 4-5  no  recumbent pre 8 min lvl 3-4  upright pre 7 min lvl 3-4  Pre 6 min lvl 3-4  7 min lvl 3-4    HS/glute/piri/HF stretching  B HS and glute/piri w/ intermittent LAD x 8 mins  same x 5-6 mins  8 mins   x7-8 mins total  x6-7 mins X5-6 mins x5-6 mins     Mobility (LTR, open books, cat/cow) x10-15 LTR    GEGE x2-3 mins  PPU 2x8 total 2x10     x2-3 mins    2x8 total x20        2x8 total    Lumbar PA glides x 2 mins total grade III x20    x3-4 mins    2x8 total    x3-4 min grade III X20 total    X2-3 mins    2x8, first set w/ OP      Same x2 mins X10-15     X2 mins    2x8 full, one set w/ OP    X2-3 mins          2x8 w/ OP     Rows/ext    Elephant stretch at table x 3-4 mins X3-4 mins  X3-4 mins  rev    Hip stability    MICHELLE w/ dowel OP x 20 total  rev rev                                                           Neuro Re Ed           TrA progressions   isos, march x10-15   isos, march, single leg ext 2x10    90/90 taps 2x8  isos and march x20  isos to 90/90 taps w/ 18# KB overhead hold 4x5, overhead pull through 2x10  rev  Isos x 20 rev    Bridge progressions  w/ ad sq x15  2x10  x20 w/ red tband     BKFO x20 B  2x10 no sq  3x10 red tband, BKFO red x20 Red tband x 20 x20    Squat progressions                Hip abd progressions                pallof   22.5# 2x10 B 17.5# x30 B        TRX squat and row  2x10 each 3x10 each B  rev      HEP and POC review  x8 min w/ progression review  Rev x 2-3 mins x2-3 mins x2-3 mins  rev X2-3 mins  X2-3 mins    Porfirio curl   18-26# KB x20 total Rev    No weight x10 18# to 10" step x10, to level ground 2x10 Reg to 10" step x 8 w/ 18# KB, to ground x 8 rev    KB squat   To 8" step 26# 3x10   TRX squat reg x10, split x10, lat x10 TRX row, chest press 3x10    Squat 3x10         Seated good mornings 27.5# x25 total  18# uni suitcase high march 50'x4 B    Ther Act                Stairs                Functional Transfers                Functional Lifting                                                                                    Modalities                 heat                  ice                  mechanical                                                      from 9/29  Access Code: [email or web address]  URL: [email or web address]  Date: 09/29/2023  Prepared by: Ángela Aldridge    Exercises  - Supine Lower Trunk Rotation  - 1 x daily - 7 x weekly - 3 sets - 10 reps  - Prone Press Up On Elbows  - 1 x daily - 7 x weekly - 3 sets - 10 reps  - Prone Press Up  - 1 x daily - 7 x weekly - 3 sets - 10 reps  - Supine Posterior Pelvic Tilt  - 1 x daily - 7 x weekly - 3 sets - 10 reps  - Supine March  - 1 x daily - 7 x weekly - 3 sets - 10 reps  - Supine Bridge  - 1 x daily - 7 x weekly - 3 sets - 10 reps

## 2023-10-24 ENCOUNTER — OFFICE VISIT (OUTPATIENT)
Dept: PHYSICAL THERAPY | Facility: CLINIC | Age: 68
End: 2023-10-24
Payer: MEDICARE

## 2023-10-24 DIAGNOSIS — M54.41 CHRONIC RIGHT-SIDED LOW BACK PAIN WITH BILATERAL SCIATICA: Primary | ICD-10-CM

## 2023-10-24 DIAGNOSIS — G89.29 CHRONIC RIGHT-SIDED LOW BACK PAIN WITH BILATERAL SCIATICA: Primary | ICD-10-CM

## 2023-10-24 DIAGNOSIS — M54.42 CHRONIC RIGHT-SIDED LOW BACK PAIN WITH BILATERAL SCIATICA: Primary | ICD-10-CM

## 2023-10-24 PROCEDURE — 97110 THERAPEUTIC EXERCISES: CPT

## 2023-10-24 PROCEDURE — 97112 NEUROMUSCULAR REEDUCATION: CPT

## 2023-10-24 NOTE — PROGRESS NOTES
Daily Note     Today's date: 10/24/2023  Patient name: Silvana Mahoney  : 1955  MRN: [id number]  Referring provider: Gato Henry DO  Dx:   Encounter Diagnosis     ICD-10-CM    1. Chronic right-sided low back pain with bilateral sciatica  M54.41     M54.42     G89.29                      Subjective: Pt reports no new complaints, back is a little stiff today. 3/10 overall. Feels that PT is helping. Objective: requires cueing w/ TRX fall outs to promote full upright posture throughout, corrects and is able to maintain. Assessment: Tolerated treatment well. Patient demonstrated fatigue post treatment and would benefit from continued PT. Does well w/ exercise progressions, fatigue but no increase in pain by end of session. Good tolerance to progressions today. Added higher level stab work w/ good effect. Add more unilateral work at next visit if able. Plan: Continue per plan of care. Carry progressions, KB progressions      Precautions: standard     POC expires Auth Status Total   Visits  Start date  Expiration date PT/OT + Visit Limit?  Co-Insurance    MEDICARE                                                  Date (Visit #) IE  (1) 10/3 (2) 10/5 (3) 10/10 (4) 10/12 (5) 10/17 (6)  10/19 (7) 10/24 (8)    DTM and TPR           Lumbar mobs           Lumbar Traction                      Exercise Diary                 Ther Ex           Active w/u    mid session 8 min lvl 4-5  no  recumbent pre 8 min lvl 3-4  upright pre 7 min lvl 3-4  Pre 6 min lvl 3-4  7 min lvl 3-4 6 min pre lvl 4-5   HS/glute/piri/HF stretching  B HS and glute/piri w/ intermittent LAD x 8 mins  same x 5-6 mins  8 mins   x7-8 mins total  x6-7 mins X5-6 mins x5-6 mins  X5-6 mins    Mobility (LTR, open books, cat/cow) x10-15 LTR    GEGE x2-3 mins  PPU 2x8 total 2x10     x2-3 mins    2x8 total x20        2x8 total    Lumbar PA glides x 2 mins total grade III x20    x3-4 mins    2x8 total    x3-4 min grade III X20 total    X2-3 mins    2x8, first set w/ OP      Same x2 mins X10-15     X2 mins    2x8 full, one set w/ OP    X2-3 mins          2x8 w/ OP      GEGE x2 mins    2x8 no OP       X2 mins    Rows/ext    Elephant stretch at table x 3-4 mins X3-4 mins  X3-4 mins  rev rev   Hip stability    MICHELLE w/ dowel OP x 20 total  rev rev            22.5# CC bicep curls x20                                               Neuro Re Ed           TrA progressions   isos, march x10-15   isos, march, single leg ext 2x10    90/90 taps 2x8  isos and march x20  isos to 90/90 taps w/ 18# KB overhead hold 4x5, overhead pull through 2x10  rev  Isos x 20 rev Isos x10    Supermans x20   Bridge progressions  w/ ad sq x15  2x10  x20 w/ red tband     BKFO x20 B  2x10 no sq  3x10 red tband, BKFO red x20 Red tband x 20 x20 X30 w/ RTB,    RTB BKFO x20   Squat progressions                Hip abd progressions                pallof   22.5# 2x10 B 17.5# x30 B     22.5# chops and pallof press x10-15 B   TRX squat and row  2x10 each 3x10 each B  rev   TRX squat, row, fall outs (Core), chest press 2x10-12 each   HEP and POC review  x8 min w/ progression review  Rev x 2-3 mins x2-3 mins x2-3 mins  rev X2-3 mins  X2-3 mins X2-3 mins   Porfirio curl   18-26# KB x20 total Rev    No weight x10 18# to 10" step x10, to level ground 2x10 Reg to 10" step x 8 w/ 18# KB, to ground x 8 rev No weight x 5    26# KB x6-8   KB squat   To 8" step 26# 3x10   TRX squat reg x10, split x10, lat x10 TRX row, chest press 3x10    Squat 3x10         Seated good mornings 27.5# x25 total  18# uni suitcase high march 50'x4 B    Ther Act                Stairs                Functional Transfers                Functional Lifting                                                                                    Modalities                 heat                  ice                  mechanical                                                      from 9/29  Access Code: [email or web address]  URL: [email or web address]  Date: 09/29/2023  Prepared by: Ángela Aldridge    Exercises  - Supine Lower Trunk Rotation  - 1 x daily - 7 x weekly - 3 sets - 10 reps  - Prone Press Up On Elbows  - 1 x daily - 7 x weekly - 3 sets - 10 reps  - Prone Press Up  - 1 x daily - 7 x weekly - 3 sets - 10 reps  - Supine Posterior Pelvic Tilt  - 1 x daily - 7 x weekly - 3 sets - 10 reps  - Supine March  - 1 x daily - 7 x weekly - 3 sets - 10 reps  - Supine Bridge  - 1 x daily - 7 x weekly - 3 sets - 10 reps

## 2023-10-26 ENCOUNTER — EVALUATION (OUTPATIENT)
Dept: PHYSICAL THERAPY | Facility: CLINIC | Age: 68
End: 2023-10-26
Payer: MEDICARE

## 2023-10-26 DIAGNOSIS — G89.29 CHRONIC RIGHT-SIDED LOW BACK PAIN WITH BILATERAL SCIATICA: Primary | ICD-10-CM

## 2023-10-26 DIAGNOSIS — M54.41 CHRONIC RIGHT-SIDED LOW BACK PAIN WITH BILATERAL SCIATICA: Primary | ICD-10-CM

## 2023-10-26 DIAGNOSIS — M54.42 CHRONIC RIGHT-SIDED LOW BACK PAIN WITH BILATERAL SCIATICA: Primary | ICD-10-CM

## 2023-10-26 PROCEDURE — 97110 THERAPEUTIC EXERCISES: CPT

## 2023-10-26 PROCEDURE — 97112 NEUROMUSCULAR REEDUCATION: CPT

## 2023-10-26 NOTE — PROGRESS NOTES
Progress Note     Today's date: 10/26/2023  Patient name: Jocelyn Echols  : 1955  MRN: [id number]  Referring provider: Marcos Calderon DO  Dx:   Encounter Diagnosis     ICD-10-CM    1. Chronic right-sided low back pain with bilateral sciatica  M54.41     M54.42     G89.29                      Subjective: Pt reports that low back is improving, only a 2-3/10 today. Feels that this is improving and is encouraged by progress. Feels that R shoulder continues to bother him and is wondering if he can get this checked. Functional update below:      Goals  Short term goals (3 weeks): ALL ACHIEVED   1) Pt will improve thoracolumbar mobility deficits by 25% pain free. 2) Pt will improve B LE and core strength deficits by 1/3 grade MMT. 3) Pt will improve pain at worse to <4/10. 4) Pt will initiate and progress HEP w/ special emphasis on functional core control and thoracolumbar mobility. Long term goals (6 weeks) ALL ACHIEVED   1) Pt will improve FOTO to at least 62.   2) Pt will sleep through the night in positioning of choosing 6/7 nights a week w/o waking due to pain. 3) Pt will perform two full weeks of normal recreation and exercise w/ pain in low back <4/10. 4) Pt will be independent and compliant w/ HEP in order to maximize functional benefit of skilled PT following d/c. *new goals to be made should treatment continue past next week      Objective: low back ROM full and pain free, core at least 3/5, pain 0-3/10 today. Low back goals achieved     R shoulder screen  Flexion and scaption to 90% w/ pain during final 10%  ER strength 4/5 w/ min pain, otherwise 4+/5 w/o pain, (+) HK, (-) full and empty can testing     MT and LT no greater than 3+/5 w/ specific cueing required to activate in functional positions    Overhead mobility improves w/ specific posterior chain cueing     Thoracic spine grossly hypomobile. Assessment: Tolerated treatment well.  Patient demonstrated fatigue post treatment and would benefit from continued PT. Pt does well w/ initial shoulder progressions. He has slightly decreased overhead ROM, decreased shoulder strength through posterior chain, and decreased kinesthetic awareness over proper movement patterns. He has two visits left, we will continue to incorporate shoulder as able over these visits w/ plan to d/c at end of next week barring setback. Pt in agreement w/ plan. Plan: Continue per plan of care. Shoulder progressions, prone to loaded, MWM, carry progressions     Precautions: standard     POC expires Auth Status Total   Visits  Start date  Expiration date PT/OT + Visit Limit?  Co-Insurance    MEDICARE                                                  Date (Visit #) IE 9/29 (1) 10/3 (2) 10/5 (3) 10/10 (4) 10/12 (5) 10/17 (6)  10/19 (7) 10/24 (8)  1026 (9) PN   DTM and TPR            Lumbar mobs            Lumbar Traction                        Exercise Diary                  Ther Ex            Active w/u    mid session 8 min lvl 4-5  no  recumbent pre 8 min lvl 3-4  upright pre 7 min lvl 3-4  Pre 6 min lvl 3-4  7 min lvl 3-4 6 min pre lvl 4-5 7 min pre lvl 3-4   HS/glute/piri/HF stretching  B HS and glute/piri w/ intermittent LAD x 8 mins  same x 5-6 mins  8 mins   x7-8 mins total  x6-7 mins X5-6 mins x5-6 mins  X5-6 mins  X3-4 mins   Mobility (LTR, open books, cat/cow) x10-15 LTR    GEGE x2-3 mins  PPU 2x8 total 2x10     x2-3 mins    2x8 total x20        2x8 total    Lumbar PA glides x 2 mins total grade III x20    x3-4 mins    2x8 total    x3-4 min grade III X20 total    X2-3 mins    2x8, first set w/ OP      Same x2 mins X10-15     X2 mins    2x8 full, one set w/ OP    X2-3 mins          2x8 w/ OP      GEGE x2 mins    2x8 no OP       X2 mins      Rev     2x8 total         Rows/ext    Elephant stretch at table x 3-4 mins X3-4 mins  X3-4 mins  rev rev rev   Hip stability    MICHELLE w/ dowel OP x 20 total  rev rev             22.5# CC bicep curls x20 rev Neuro Re Ed            TrA progressions   isos, march x10-15   isos, march, single leg ext 2x10    90/90 taps 2x8  isos and march x20  isos to 90/90 taps w/ 18# KB overhead hold 4x5, overhead pull through 2x10  rev  Isos x 20 rev Isos x10    Supermans x20    Bridge progressions  w/ ad sq x15  2x10  x20 w/ red tband     BKFO x20 B  2x10 no sq  3x10 red tband, BKFO red x20 Red tband x 20 x20 X30 w/ RTB,    RTB BKFO x20 Reg x 20   Squat progressions                 Hip abd progressions                 pallof   22.5# 2x10 B 17.5# x30 B     22.5# chops and pallof press x10-15 B rev   TRX squat and row  2x10 each 3x10 each B  rev   TRX squat, row, fall outs (Core), chest press 2x10-12 each All x20-25 each   HEP and POC review  x8 min w/ progression review  Rev x 2-3 mins x2-3 mins x2-3 mins  rev X2-3 mins  X2-3 mins X2-3 mins X2-3 mins   Porfirio curl   18-26# KB x20 total Rev    No weight x10 18# to 10" step x10, to level ground 2x10 Reg to 10" step x 8 w/ 18# KB, to ground x 8 rev No weight x 5    26# KB x6-8 rev   KB squat   To 8" step 26# 3x10   TRX squat reg x10, split x10, lat x10 TRX row, chest press 3x10    Squat 3x10          Seated good mornings 27.5# x25 total  18# uni suitcase high march 50'x4 B              Shoulder screen x10 min    Wall slides w/ lift off, supermans w/ progressions   Ther Act                 Stairs                 Functional Transfers                 Functional Lifting                                                                                         Modalities                  heat                   ice                   mechanical                                                         from 9/29  Access Code: [email or web address]  URL: [email or web address]  Date: 09/29/2023  Prepared by: Jolanta Splinter    Exercises  - Supine Lower Trunk Rotation  - 1 x daily - 7 x weekly - 3 sets - 10 reps  - Prone Press Up On Elbows  - 1 x daily - 7 x weekly - 3 sets - 10 reps  - Prone Press Up  - 1 x daily - 7 x weekly - 3 sets - 10 reps  - Supine Posterior Pelvic Tilt  - 1 x daily - 7 x weekly - 3 sets - 10 reps  - Supine March  - 1 x daily - 7 x weekly - 3 sets - 10 reps  - Supine Bridge  - 1 x daily - 7 x weekly - 3 sets - 10 reps

## 2023-10-31 ENCOUNTER — OFFICE VISIT (OUTPATIENT)
Dept: PHYSICAL THERAPY | Facility: CLINIC | Age: 68
End: 2023-10-31
Payer: MEDICARE

## 2023-10-31 DIAGNOSIS — M54.42 CHRONIC RIGHT-SIDED LOW BACK PAIN WITH BILATERAL SCIATICA: Primary | ICD-10-CM

## 2023-10-31 DIAGNOSIS — M54.41 CHRONIC RIGHT-SIDED LOW BACK PAIN WITH BILATERAL SCIATICA: Primary | ICD-10-CM

## 2023-10-31 DIAGNOSIS — G89.29 CHRONIC RIGHT-SIDED LOW BACK PAIN WITH BILATERAL SCIATICA: Primary | ICD-10-CM

## 2023-10-31 PROCEDURE — 97112 NEUROMUSCULAR REEDUCATION: CPT

## 2023-10-31 PROCEDURE — 97110 THERAPEUTIC EXERCISES: CPT

## 2023-10-31 NOTE — PROGRESS NOTES
Daily Note     Today's date: 10/31/2023  Patient name: Christophe Huntley  : 1955  MRN: [id number]  Referring provider: Rhea Black DO  Dx:   Encounter Diagnosis     ICD-10-CM    1. Chronic right-sided low back pain with bilateral sciatica  M54.41     M54.42     G89.29                      Subjective: Pt reports no new complaints. Feels pretty good today, encouraged by progress. Objective: requires cueing w/ wall slides and wall slides w/ lift off to perform w/ full MT/LT activation throughout, tends to overutilize FHP and rounded shoulders unless cued. Able to correct and maintain. Assessment: Tolerated treatment well. Patient demonstrated fatigue post treatment and would benefit from continued PT. Does well w/ exercise program. Combined low back work w/ shoulder progressions. Will benefit from more aggressive challenges barring setback, focus on techniques that can be replicated on home. Next visit will plan to be the last barring significant setback,      Plan: Continue per plan of care. Finalize HEP for full body     Precautions: standard     POC expires Auth Status Total   Visits  Start date  Expiration date PT/OT + Visit Limit?  Co-Insurance    MEDICARE                                                  Date (Visit #) 10/12 (5) 10/17 (6)  10/19 (7) 10/24 (8)  1026 (9) PN 10/31 (10)    DTM and TPR          Lumbar mobs          Lumbar Traction                    Exercise Diary            Ther Ex          Active w/u  upright pre 7 min lvl 3-4  Pre 6 min lvl 3-4  7 min lvl 3-4 6 min pre lvl 4-5 7 min pre lvl 3-4 7 min pre recumbent lvl 2-3    HS/glute/piri/HF stretching x6-7 mins X5-6 mins x5-6 mins  X5-6 mins  X3-4 mins X8 mins    Mobility (LTR, open books, cat/cow) X20 total    X2-3 mins    2x8, first set w/ OP      Same x2 mins X10-15     X2 mins    2x8 full, one set w/ OP    X2-3 mins          2x8 w/ OP      GEGE x2 mins    2x8 no OP       X2 mins      Rev     2x8 total           X2 mins    2x8     X3-4 mins  X3-4 mins  rev rev rev       rev rev           22.5# CC bicep curls x20 rev           MWM for R shoulder scaption x20                                  Neuro Re Ed          TrA progressions   rev  Isos x 20 rev Isos x10    Supermans x20  rev    Bridge progressions  3x10 red tband, BKFO red x20 Red tband x 20 x20 X30 w/ RTB,    RTB BKFO x20 Reg x 20 X20 reg    Squat progressions           Hip abd progressions           pallof     22.5# chops and pallof press x10-15 B rev     TRX squat and row rev   TRX squat, row, fall outs (Core), chest press 2x10-12 each All x20-25 each X20 reg     HEP and POC review  rev X2-3 mins  X2-3 mins X2-3 mins X2-3 mins X2-3 mins     Porfirio curl 18# to 10" step x10, to level ground 2x10 Reg to 10" step x 8 w/ 18# KB, to ground x 8 rev No weight x 5    26# KB x6-8 rev     KB squat  TRX squat reg x10, split x10, lat x10 TRX row, chest press 3x10    Squat 3x10        Seated good mornings 27.5# x25 total  18# uni suitcase high march 50'x4 B            Shoulder screen x10 min    Wall slides w/ lift off, supermans w/ progressions Green B ER w/ scap sq x20    Blue wall slides x20, w/ lift off x20    Standing CC row 22.5# uni 2x10 each    Ther Act           Stairs           Functional Transfers           Functional Lifting                                                           Modalities            heat             ice             mechanical                                       from 9/29  Access Code: [email or web address]  URL: [email or web address]  Date: 09/29/2023  Prepared by: Mancil Salines    Exercises  - Supine Lower Trunk Rotation  - 1 x daily - 7 x weekly - 3 sets - 10 reps  - Prone Press Up On Elbows  - 1 x daily - 7 x weekly - 3 sets - 10 reps  - Prone Press Up  - 1 x daily - 7 x weekly - 3 sets - 10 reps  - Supine Posterior Pelvic Tilt  - 1 x daily - 7 x weekly - 3 sets - 10 reps  - Supine March  - 1 x daily - 7 x weekly - 3 sets - 10 reps  - Supine Bridge  - 1 x daily - 7 x weekly - 3 sets - 10 reps

## 2023-11-02 ENCOUNTER — OFFICE VISIT (OUTPATIENT)
Dept: PHYSICAL THERAPY | Facility: CLINIC | Age: 68
End: 2023-11-02
Payer: MEDICARE

## 2023-11-02 DIAGNOSIS — G89.29 CHRONIC RIGHT-SIDED LOW BACK PAIN WITH BILATERAL SCIATICA: Primary | ICD-10-CM

## 2023-11-02 DIAGNOSIS — M54.41 CHRONIC RIGHT-SIDED LOW BACK PAIN WITH BILATERAL SCIATICA: Primary | ICD-10-CM

## 2023-11-02 DIAGNOSIS — M54.42 CHRONIC RIGHT-SIDED LOW BACK PAIN WITH BILATERAL SCIATICA: Primary | ICD-10-CM

## 2023-11-02 PROCEDURE — 97110 THERAPEUTIC EXERCISES: CPT

## 2023-11-02 PROCEDURE — 97112 NEUROMUSCULAR REEDUCATION: CPT

## 2023-11-02 NOTE — PROGRESS NOTES
Possible D/C Note     Today's date: 2023  Patient name: Thomas Sosa  : 1955  MRN: [id number]  Referring provider: Darnell Weathers DO  Dx:   Encounter Diagnosis     ICD-10-CM    1. 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 Playing pool on 10/31 went well. Notes that R shoulder is sore, sometimes getting a little worse. Will continue w/ exercises on his own and will reach out again should he require more help. Goals  Short term goals (3 weeks): ALL ACHIEVED   1) Pt will improve thoracolumbar mobility deficits by 25% pain free. 2) Pt will improve B LE and core strength deficits by 1/3 grade MMT. 3) Pt will improve pain at worse to <4/10. 4) Pt will initiate and progress HEP w/ special emphasis on functional core control and thoracolumbar mobility. Long term goals (6 weeks) ALL ACHIEVED   1) Pt will improve FOTO to at least 62.   2) Pt will sleep through the night in positioning of choosing 6/7 nights a week w/o waking due to pain. 3) Pt will perform two full weeks of normal recreation and exercise w/ pain in low back <4/10. 4) Pt will be independent and compliant w/ HEP in order to maximize functional benefit of skilled PT following d/c. *new goals to be made should treatment continue past next week      Objective: please see 10/26 note for objective measurements at time of last visit. Assessment: Tolerated treatment well. Patient demonstrated fatigue post treatment and exhibited good technique with therapeutic exercises. Pt does well within today's session, he will continue w/ his exercises over the next few weeks and reach out w/ any future issues. He was given contact info for various pain management MD's in the network. Will contact our office should he require more help in the future. Pt in agreement w/ plan.        Plan: Possible d/c - will call over next few weeks should he require more help. Precautions: standard     POC expires Auth Status Total   Visits  Start date  Expiration date PT/OT + Visit Limit?  Co-Insurance    MEDICARE                                                  Date (Visit #) 10/12 (5) 10/17 (6)  10/19 (7) 10/24 (8)  1026 (9) PN 10/31 (10) 11/2 (11)   DTM and TPR          Lumbar mobs          Lumbar Traction                    Exercise Diary            Ther Ex          Active w/u  upright pre 7 min lvl 3-4  Pre 6 min lvl 3-4  7 min lvl 3-4 6 min pre lvl 4-5 7 min pre lvl 3-4 7 min pre recumbent lvl 2-3 8 min pre lvl 2-3   HS/glute/piri/HF stretching x6-7 mins X5-6 mins x5-6 mins  X5-6 mins  X3-4 mins X8 mins X8 mins    Mobility (LTR, open books, cat/cow) X20 total    X2-3 mins    2x8, first set w/ OP      Same x2 mins X10-15     X2 mins    2x8 full, one set w/ OP    X2-3 mins          2x8 w/ OP      GEGE x2 mins    2x8 no OP       X2 mins      Rev     2x8 total           X2 mins    2x8         2x8    X3-4 mins  X3-4 mins  rev rev rev       rev rev           22.5# CC bicep curls x20 rev           MWM for R shoulder scaption x20 rev                                 Neuro Re Ed          TrA progressions   rev  Isos x 20 rev Isos x10    Supermans x20  rev rev   Bridge progressions  3x10 red tband, BKFO red x20 Red tband x 20 x20 X30 w/ RTB,    RTB BKFO x20 Reg x 20 X20 reg X20-25 total   Squat progressions           Hip abd progressions           pallof     22.5# chops and pallof press x10-15 B rev     TRX squat and row rev   TRX squat, row, fall outs (Core), chest press 2x10-12 each All x20-25 each X20 reg  TRX squat x20    TRX rows, I and Y eccentrics 5 sec 2x10 each   HEP and POC review  rev X2-3 mins  X2-3 mins X2-3 mins X2-3 mins X2-3 mins  POC discussion x 5 mins    Porfirio curl 18# to 10" step x10, to level ground 2x10 Reg to 10" step x 8 w/ 18# KB, to ground x 8 rev No weight x 5    26# KB x6-8 rev     KB squat  TRX squat reg x10, split x10, lat x10 TRX row, chest press 3x10    Squat 3x10        Seated good mornings 27.5# x25 total  18# uni suitcase high march 50'x4 B            Shoulder screen x10 min    Wall slides w/ lift off, supermans w/ progressions Green B ER w/ scap sq x20    Blue wall slides x20, w/ lift off x20    Standing CC row 22.5# uni 2x10 each Rev of each x 2-3 mins   Ther Act           Stairs           Functional Transfers           Functional Lifting                                                           Modalities            heat             ice             mechanical                                       from 9/29  Access Code: [email or web address]  URL: [email or web address]  Date: 09/29/2023  Prepared by: Julio Santana    Exercises  - Supine Lower Trunk Rotation  - 1 x daily - 7 x weekly - 3 sets - 10 reps  - Prone Press Up On Elbows  - 1 x daily - 7 x weekly - 3 sets - 10 reps  - Prone Press Up  - 1 x daily - 7 x weekly - 3 sets - 10 reps  - Supine Posterior Pelvic Tilt  - 1 x daily - 7 x weekly - 3 sets - 10 reps  - Supine March  - 1 x daily - 7 x weekly - 3 sets - 10 reps  - Supine Bridge  - 1 x daily - 7 x weekly - 3 sets - 10 reps

## 2023-12-28 ENCOUNTER — OFFICE VISIT (OUTPATIENT)
Dept: FAMILY MEDICINE CLINIC | Facility: CLINIC | Age: 68
End: 2023-12-28
Payer: MEDICARE

## 2023-12-28 ENCOUNTER — TELEPHONE (OUTPATIENT)
Age: 68
End: 2023-12-28

## 2023-12-28 VITALS
WEIGHT: 151.4 LBS | BODY MASS INDEX: 24.33 KG/M2 | TEMPERATURE: 97.3 F | HEIGHT: 66 IN | SYSTOLIC BLOOD PRESSURE: 148 MMHG | RESPIRATION RATE: 18 BRPM | DIASTOLIC BLOOD PRESSURE: 82 MMHG | HEART RATE: 75 BPM

## 2023-12-28 DIAGNOSIS — U07.1 COVID-19 VIRUS INFECTION: Primary | ICD-10-CM

## 2023-12-28 DIAGNOSIS — R05.1 ACUTE COUGH: ICD-10-CM

## 2023-12-28 LAB
SARS-COV-2 AG UPPER RESP QL IA: POSITIVE
VALID CONTROL: ABNORMAL

## 2023-12-28 PROCEDURE — 99213 OFFICE O/P EST LOW 20 MIN: CPT | Performed by: FAMILY MEDICINE

## 2023-12-28 PROCEDURE — 87811 SARS-COV-2 COVID19 W/OPTIC: CPT | Performed by: FAMILY MEDICINE

## 2023-12-28 RX ORDER — CODEINE PHOSPHATE/GUAIFENESIN 10-100MG/5
10 LIQUID (ML) ORAL
Qty: 120 ML | Refills: 0 | Status: SHIPPED | OUTPATIENT
Start: 2023-12-28

## 2023-12-28 RX ORDER — NIRMATRELVIR AND RITONAVIR 300-100 MG
3 KIT ORAL 2 TIMES DAILY
Qty: 30 TABLET | Refills: 0 | Status: SHIPPED | OUTPATIENT
Start: 2023-12-28 | End: 2024-01-02

## 2023-12-28 RX ORDER — BUDESONIDE AND FORMOTEROL FUMARATE DIHYDRATE 160; 4.5 UG/1; UG/1
2 AEROSOL RESPIRATORY (INHALATION) 2 TIMES DAILY
COMMUNITY
Start: 2023-11-21 | End: 2024-05-19

## 2023-12-28 NOTE — TELEPHONE ENCOUNTER
Pt wife was calling to schedule the pt an appt stated he has bad headaches, a sore throat which is causing him pain, coughing up mucus, and overall has a bad cough. Pt wife stated that he is on defibrillator and is just concerned about him getting worse and was hoping if we get any cancellations today she would like him to be seen. Please advise if needed thank you.

## 2023-12-28 NOTE — PROGRESS NOTES
Assessment/Plan:    1. Acute cough  -     POCT Rapid Covid Ag    2. COVID-19 virus infection  -     nirmatrelvir & ritonavir (Paxlovid, 300/100,) tablet therapy pack; Take 3 tablets by mouth 2 (two) times a day for 5 days Take 2 nirmatrelvir tablets + 1 ritonavir tablet together per dose    Pt adviused on isolation  Will cut the CCB in half given history - pt will take pt at home  Pt to stop statin  Cr calculated and is over 60        There are no Patient Instructions on file for this visit.    No follow-ups on file.    Subjective:      Patient ID: Robin Stephens is a 68 y.o. male.    Chief Complaint   Patient presents with   • Chills     Symptoms started on Tuesday Coral Zavala LPN     • Generalized Body Aches   • Cough   • Headache       Pt is sched for a same day appt for HA and sore throat  Pt states 2 days ago felt sick  Had HA body ach and chills as well as diarrhea  Pt also with cougjh        The following portions of the patient's history were reviewed and updated as appropriate: allergies, current medications, past family history, past medical history, past social history, past surgical history and problem list.    Review of Systems   HENT:  Positive for congestion.    Respiratory:  Positive for cough.    Neurological:  Positive for headaches.         Current Outpatient Medications   Medication Sig Dispense Refill   • amLODIPine (NORVASC) 10 mg tablet Take 10 mg by mouth every evening       • aspirin (ECOTRIN LOW STRENGTH) 81 mg EC tablet TAKE ONE TABLET BY MOUTH DAILY FOR THE HEART.     • atorvastatin (LIPITOR) 80 mg tablet Take 1 tablet (80 mg total) by mouth in the morning 90 tablet 3   • budesonide-formoterol (SYMBICORT) 160-4.5 mcg/act inhaler Inhale 2 puffs 2 (two) times a day     • buPROPion (WELLBUTRIN XL) 150 mg 24 hr tablet Take 150 mg by mouth       • Cholecalciferol (VITAMIN D-3 PO) TAKE ONE TABLET BY MOUTH DAILY FOR VITAMIN D SUPPLEMENTATION     • cyclobenzaprine (FLEXERIL) 10 mg tablet  "Take 10 mg by mouth daily as needed       • escitalopram (LEXAPRO) 20 mg tablet Take 20 mg by mouth       • Flovent  MCG/ACT inhaler      • fluticasone (FLONASE) 50 mcg/act nasal spray as needed     • folic acid (FOLVITE) 1 mg tablet 1 mg     • furosemide (LASIX) 20 mg tablet if needed     • hydrochlorothiazide (HYDRODIURIL) 25 mg tablet Take 12.5 mg by mouth daily     • levalbuterol (XOPENEX HFA) 45 mcg/act inhaler Inhale every 6 (six) hours as needed     • lidocaine (LIDODERM) 5 % APPLY 1 PATCH ON SKIN DAILY FOR PAIN. DO NOT WEAR FOR LONGER THAN 12 HOURS IN ANY 24 HOUR PERIOD. WASH YOUR HANDS AFTER APPLYING A PATCH.     • losartan (COZAAR) 50 mg tablet Take 50 mg by mouth daily Patient is taking one pill per day of 50mg.     • melatonin 3 mg Take 3 mg by mouth     • meloxicam (MOBIC) 15 mg tablet Take 1 tablet (15 mg total) by mouth daily As needed 30 tablet 0   • metoprolol succinate (TOPROL-XL) 50 mg 24 hr tablet Take 200 mg by mouth every morning     • montelukast (SINGULAIR) 10 mg tablet Take 10 mg by mouth     • Multiple Vitamin (MULTIVITAMIN PO) TAKE 1 CAP/TAB BY MOUTH DAILY FOR VITAMIN SUPPLEMENT     • nirmatrelvir & ritonavir (Paxlovid, 300/100,) tablet therapy pack Take 3 tablets by mouth 2 (two) times a day for 5 days Take 2 nirmatrelvir tablets + 1 ritonavir tablet together per dose 30 tablet 0   • omeprazole (PriLOSEC) 20 mg delayed release capsule take 1 capsule by mouth once daily 60 capsule 1   • prazosin (MINIPRESS) 5 mg capsule Take 5 mg by mouth daily at bedtime     • sodium chloride (OCEAN) 0.65 % nasal spray 1 spray into each nostril 4 (four) times a day as needed     • Spacer/Aero-Holding Chambers (BreatheRite Andrew Spacer Adult) MISC Use 1 each     • Spacer/Aero-Holding Chambers (OptiChamber Elicia) MISC        No current facility-administered medications for this visit.       Objective:    /82   Pulse 75   Temp (!) 97.3 °F (36.3 °C)   Resp 18   Ht 5' 6\" (1.676 m)   Wt 68.7 " kg (151 lb 6.4 oz)   BMI 24.44 kg/m²        Physical Exam  HENT:      Nose: Congestion and rhinorrhea present.                Frank Lombardi, DO

## 2024-01-12 ENCOUNTER — APPOINTMENT (OUTPATIENT)
Dept: LAB | Facility: CLINIC | Age: 69
End: 2024-01-12
Payer: MEDICARE

## 2024-01-12 ENCOUNTER — TRANSCRIBE ORDERS (OUTPATIENT)
Dept: LAB | Facility: CLINIC | Age: 69
End: 2024-01-12

## 2024-01-12 DIAGNOSIS — I47.19 ATRIAL TACHYCARDIA: Primary | ICD-10-CM

## 2024-01-12 DIAGNOSIS — Z95.818 STATUS POST PLACEMENT OF IMPLANTABLE LOOP RECORDER: ICD-10-CM

## 2024-01-12 DIAGNOSIS — E78.2 MIXED HYPERLIPIDEMIA: ICD-10-CM

## 2024-01-12 DIAGNOSIS — Z95.1 HISTORY OF CORONARY ARTERY BYPASS GRAFT: ICD-10-CM

## 2024-01-12 DIAGNOSIS — I47.19 ATRIAL TACHYCARDIA: ICD-10-CM

## 2024-01-12 DIAGNOSIS — D86.85 CARDIAC SARCOIDOSIS: ICD-10-CM

## 2024-01-12 LAB
ALBUMIN SERPL BCP-MCNC: 4.3 G/DL (ref 3.5–5)
ALP SERPL-CCNC: 83 U/L (ref 34–104)
ALT SERPL W P-5'-P-CCNC: 24 U/L (ref 7–52)
ANION GAP SERPL CALCULATED.3IONS-SCNC: 11 MMOL/L
AST SERPL W P-5'-P-CCNC: 21 U/L (ref 13–39)
BASOPHILS # BLD AUTO: 0.08 THOUSANDS/ÂΜL (ref 0–0.1)
BASOPHILS NFR BLD AUTO: 1 % (ref 0–1)
BILIRUB SERPL-MCNC: 0.76 MG/DL (ref 0.2–1)
BUN SERPL-MCNC: 30 MG/DL (ref 5–25)
CALCIUM SERPL-MCNC: 9.5 MG/DL (ref 8.4–10.2)
CHLORIDE SERPL-SCNC: 104 MMOL/L (ref 96–108)
CHOLEST SERPL-MCNC: 173 MG/DL
CO2 SERPL-SCNC: 28 MMOL/L (ref 21–32)
CREAT SERPL-MCNC: 1.19 MG/DL (ref 0.6–1.3)
EOSINOPHIL # BLD AUTO: 0.56 THOUSAND/ÂΜL (ref 0–0.61)
EOSINOPHIL NFR BLD AUTO: 6 % (ref 0–6)
ERYTHROCYTE [DISTWIDTH] IN BLOOD BY AUTOMATED COUNT: 15.5 % (ref 11.6–15.1)
GFR SERPL CREATININE-BSD FRML MDRD: 62 ML/MIN/1.73SQ M
GLUCOSE P FAST SERPL-MCNC: 96 MG/DL (ref 65–99)
HCT VFR BLD AUTO: 43.3 % (ref 36.5–49.3)
HDLC SERPL-MCNC: 39 MG/DL
HGB BLD-MCNC: 13.9 G/DL (ref 12–17)
IMM GRANULOCYTES # BLD AUTO: 0.06 THOUSAND/UL (ref 0–0.2)
IMM GRANULOCYTES NFR BLD AUTO: 1 % (ref 0–2)
LDLC SERPL CALC-MCNC: 106 MG/DL (ref 0–100)
LYMPHOCYTES # BLD AUTO: 1.84 THOUSANDS/ÂΜL (ref 0.6–4.47)
LYMPHOCYTES NFR BLD AUTO: 20 % (ref 14–44)
MCH RBC QN AUTO: 25.6 PG (ref 26.8–34.3)
MCHC RBC AUTO-ENTMCNC: 32.1 G/DL (ref 31.4–37.4)
MCV RBC AUTO: 80 FL (ref 82–98)
MONOCYTES # BLD AUTO: 0.84 THOUSAND/ÂΜL (ref 0.17–1.22)
MONOCYTES NFR BLD AUTO: 9 % (ref 4–12)
NEUTROPHILS # BLD AUTO: 5.92 THOUSANDS/ÂΜL (ref 1.85–7.62)
NEUTS SEG NFR BLD AUTO: 63 % (ref 43–75)
NRBC BLD AUTO-RTO: 0 /100 WBCS
PLATELET # BLD AUTO: 371 THOUSANDS/UL (ref 149–390)
PMV BLD AUTO: 10.5 FL (ref 8.9–12.7)
POTASSIUM SERPL-SCNC: 4.2 MMOL/L (ref 3.5–5.3)
PROT SERPL-MCNC: 7.5 G/DL (ref 6.4–8.4)
RBC # BLD AUTO: 5.43 MILLION/UL (ref 3.88–5.62)
SODIUM SERPL-SCNC: 143 MMOL/L (ref 135–147)
TRIGL SERPL-MCNC: 138 MG/DL
WBC # BLD AUTO: 9.3 THOUSAND/UL (ref 4.31–10.16)

## 2024-01-12 PROCEDURE — 83704 LIPOPROTEIN BLD QUAN PART: CPT

## 2024-01-12 PROCEDURE — 80061 LIPID PANEL: CPT

## 2024-01-12 PROCEDURE — 36415 COLL VENOUS BLD VENIPUNCTURE: CPT

## 2024-01-12 PROCEDURE — 85025 COMPLETE CBC W/AUTO DIFF WBC: CPT

## 2024-01-15 ENCOUNTER — TELEPHONE (OUTPATIENT)
Dept: FAMILY MEDICINE CLINIC | Facility: CLINIC | Age: 69
End: 2024-01-15

## 2024-01-15 LAB
CHOLEST SERPL-MCNC: 192 MG/DL (ref 100–199)
HDL SERPL-SCNC: 24.5 UMOL/L
HDLC SERPL-MCNC: 36 MG/DL
LDL SERPL QN: 20.4 NM
LDL SERPL QN: 922 NMOL/L
LDL SERPL-SCNC: 1628 NMOL/L
LDLC SERPL CALC-MCNC: 131 MG/DL (ref 0–99)
LP-IR SCORE SERPL: 51
TRIGL SERPL-MCNC: 139 MG/DL (ref 0–149)

## 2024-01-19 NOTE — TELEPHONE ENCOUNTER
Received call from patient's spouse Guy to fu on AWV scheduled for 2/2 and to discuss lab results. She is concerned to wait until then to review results. Please follow up with patient to confirm PCP will review at OV or will reach out to patient prior to OV; please call patient.

## 2024-01-29 ENCOUNTER — RA CDI HCC (OUTPATIENT)
Dept: OTHER | Facility: HOSPITAL | Age: 69
End: 2024-01-29

## 2024-01-29 NOTE — PROGRESS NOTES
HCC coding opportunities          Chart Reviewed number of suggestions sent to Provider: 1     Patients Insurance   F10.21  Medicare Insurance: Medicare

## 2024-02-12 ENCOUNTER — OFFICE VISIT (OUTPATIENT)
Dept: FAMILY MEDICINE CLINIC | Facility: CLINIC | Age: 69
End: 2024-02-12
Payer: MEDICARE

## 2024-02-12 VITALS
BODY MASS INDEX: 27.01 KG/M2 | TEMPERATURE: 97.6 F | SYSTOLIC BLOOD PRESSURE: 140 MMHG | HEIGHT: 64 IN | HEART RATE: 62 BPM | RESPIRATION RATE: 18 BRPM | WEIGHT: 158.2 LBS | DIASTOLIC BLOOD PRESSURE: 80 MMHG

## 2024-02-12 DIAGNOSIS — I25.119 ATHEROSCLEROSIS OF NATIVE CORONARY ARTERY OF NATIVE HEART WITH ANGINA PECTORIS (HCC): ICD-10-CM

## 2024-02-12 DIAGNOSIS — F02.80 DEMENTIA IN OTHER DISEASES CLASSIFIED ELSEWHERE, UNSPECIFIED SEVERITY, WITHOUT BEHAVIORAL DISTURBANCE, PSYCHOTIC DISTURBANCE, MOOD DISTURBANCE, AND ANXIETY (HCC): ICD-10-CM

## 2024-02-12 DIAGNOSIS — Z12.5 SCREENING FOR PROSTATE CANCER: ICD-10-CM

## 2024-02-12 DIAGNOSIS — F12.20 CANNABIS DEPENDENCE (HCC): ICD-10-CM

## 2024-02-12 DIAGNOSIS — I42.9 CARDIOMYOPATHY, UNSPECIFIED TYPE (HCC): ICD-10-CM

## 2024-02-12 DIAGNOSIS — I10 HYPERTENSION, UNSPECIFIED TYPE: ICD-10-CM

## 2024-02-12 DIAGNOSIS — F33.1 MAJOR DEPRESSIVE DISORDER, RECURRENT, MODERATE (HCC): ICD-10-CM

## 2024-02-12 DIAGNOSIS — Z00.00 MEDICARE ANNUAL WELLNESS VISIT, SUBSEQUENT: Primary | ICD-10-CM

## 2024-02-12 DIAGNOSIS — R73.09 ABNORMAL GLUCOSE: ICD-10-CM

## 2024-02-12 PROCEDURE — G0439 PPPS, SUBSEQ VISIT: HCPCS | Performed by: FAMILY MEDICINE

## 2024-02-12 NOTE — PROGRESS NOTES
Assessment and Plan:     Problem List Items Addressed This Visit        Cardiovascular and Mediastinum    Atherosclerotic heart disease of native coronary artery with unspecified angina pectoris (HCC)    HTN (hypertension)    Relevant Orders    Comprehensive metabolic panel    Lipid Panel with Direct LDL reflex    CBC    Cardiomyopathy (HCC)       Nervous and Auditory    Dementia in other diseases classified elsewhere, unspecified severity, without behavioral disturbance, psychotic disturbance, mood disturbance, and anxiety (HCC)       Other    Major depressive disorder, recurrent, moderate (HCC)    Cannabis dependence (HCC)   Other Visit Diagnoses     Medicare annual wellness visit, subsequent    -  Primary    Screening for prostate cancer        Relevant Orders    PSA, Total Screen    Abnormal glucose        Relevant Orders    Hemoglobin A1C           Preventive health issues were discussed with patient, and age appropriate screening tests were ordered as noted in patient's After Visit Summary.  Personalized health advice and appropriate referrals for health education or preventive services given if needed, as noted in patient's After Visit Summary.     History of Present Illness:     Patient presents for a Medicare Wellness Visit    Pt is here for an AWV  Pt had labs  BP is slightly eklevated       Patient Care Team:  Frank Lombardi, DO as PCP - General (Family Medicine)     Review of Systems:     Review of Systems   Constitutional:  Negative for activity change, appetite change, chills, diaphoresis, fatigue, fever and unexpected weight change.   HENT:  Negative for congestion, dental problem, ear pain, mouth sores, sinus pressure, sinus pain, sore throat and trouble swallowing.    Eyes:  Negative for photophobia, discharge and itching.   Respiratory:  Negative for apnea, chest tightness and shortness of breath.    Cardiovascular:  Negative for chest pain, palpitations and leg swelling.   Gastrointestinal:   Negative for abdominal distention, abdominal pain, blood in stool, nausea and vomiting.   Endocrine: Negative for cold intolerance, heat intolerance, polydipsia, polyphagia and polyuria.   Genitourinary:  Negative for difficulty urinating.   Musculoskeletal:  Negative for arthralgias.   Skin:  Negative for color change and wound.   Neurological:  Negative for dizziness, syncope, speech difficulty and headaches.   Hematological:  Negative for adenopathy.   Psychiatric/Behavioral:  Negative for agitation and behavioral problems.         Problem List:     Patient Active Problem List   Diagnosis   • Colon polyps   • Gastroesophageal reflux disease with esophagitis   • Atherosclerotic heart disease of native coronary artery with unspecified angina pectoris ([facility])   • HTN (hypertension)   • Mixed hyperlipidemia   • History of coronary artery bypass graft - CABG x2 in 2007   • Status post placement of implantable loop recorder - bradycardia   • TIA (transient ischemic attack)   • Anxiety   • Major depressive disorder, recurrent, moderate ([facility])   • Leukocytosis   • Obstructive sleep apnea   • Asthma, moderate persistent   • Benign prostatic hyperplasia   • Bradycardia   • Cannabis dependence ([facility])   • Cardiomyopathy ([facility])   • Chronic allergic rhinitis   • Degeneration of intervertebral disc of cervical region   • Dementia in other diseases classified elsewhere, unspecified severity, without behavioral disturbance, psychotic disturbance, mood disturbance, and anxiety ([facility])   • Diverticulosis of colon   • Esophageal stricture   • Generalized chronic periodontitis   • Personal history of transient ischemic attack (TIA) and cerebral infarction without residual deficit   • Post-traumatic stress disorder, chronic   • S/P ICD (internal cardiac defibrillator) procedure   • Cardiac sarcoidosis   • Tobacco use   • Coronary artery disease   • History of open heart surgery   • S/P ACL reconstruction   • Chronic alcoholism in remission  ([facility])   • Environmental exposure   • Former smoker   • GERD (gastroesophageal reflux disease)   • Chronic lumbar pain   • Presence of combination internal cardiac defibrillator (ICD) and pacemaker   • Tinnitus, bilateral   • Resistant hypertension      Past Medical and Surgical History:     Past Medical History:   Diagnosis Date   • Alcohol dependence, uncomplicated ([facility]) 10/28/2022   • Coronary artery disease    • Hyperlipemia    • Hypertension    • Irregular heart beat     bradycardia     Past Surgical History:   Procedure Laterality Date   • ANTERIOR CRUCIATE LIGAMENT REPAIR Left 2001   • CARDIAC LOOP RECORDER  03/2022    MD from [facility] possible pacer placement   • CARDIAC SURGERY  2007    CABG x2      Family History:     Family History   Problem Relation Age of Onset   • Arthritis Mother    • Leukemia Maternal Aunt    • Mental illness Neg Hx       Social History:     Social History     Socioeconomic History   • Marital status: /Civil Union     Spouse name: None   • Number of children: None   • Years of education: None   • Highest education level: None   Occupational History   • None   Tobacco Use   • Smoking status: Former     Current packs/day: 0.25     Average packs/day: 0.3 packs/day for 55.1 years (13.8 ttl pk-yrs)     Types: Cigarettes     Start date: 1969     Passive exposure: Past   • Smokeless tobacco: Never   Vaping Use   • Vaping status: Never Used   Substance and Sexual Activity   • Alcohol use: Never   • Drug use: Never   • Sexual activity: None   Other Topics Concern   • None   Social History Narrative   • None     Social Determinants of Health     Financial Resource Strain: Low Risk  (2/5/2024)    Overall Financial Resource Strain (CARDIA)    • Difficulty of Paying Living Expenses: Not hard at all   Food Insecurity: Not on file   Transportation Needs: No Transportation Needs (2/5/2024)    PRAPARE - Transportation    • Lack of Transportation (Medical): No    • Lack of Transportation  (Non-Medical): No   Physical Activity: Not on file   Stress: Not on file   Social Connections: Not on file   Intimate Partner Violence: Not on file   Housing Stability: Not on file      Medications and Allergies:     Current Outpatient Medications   Medication Sig Dispense Refill   • amLODIPine (NORVASC) 10 mg tablet Take 10 mg by mouth every evening       • aspirin (ECOTRIN LOW STRENGTH) 81 mg EC tablet TAKE ONE TABLET BY MOUTH DAILY FOR THE HEART.     • atorvastatin (LIPITOR) 80 mg tablet Take 1 tablet (80 mg total) by mouth in the morning 90 tablet 3   • budesonide-formoterol (SYMBICORT) 160-4.5 mcg/act inhaler Inhale 2 puffs 2 (two) times a day     • buPROPion (WELLBUTRIN XL) 150 mg 24 hr tablet Take 150 mg by mouth       • Cholecalciferol (VITAMIN D-3 PO) TAKE ONE TABLET BY MOUTH DAILY FOR VITAMIN D SUPPLEMENTATION     • cyclobenzaprine (FLEXERIL) 10 mg tablet Take 10 mg by mouth daily as needed       • escitalopram (LEXAPRO) 20 mg tablet Take 20 mg by mouth       • Flovent  MCG/ACT inhaler      • fluticasone (FLONASE) 50 mcg/act nasal spray as needed     • folic acid (FOLVITE) 1 mg tablet 1 mg     • furosemide (LASIX) 20 mg tablet if needed     • guaifenesin-codeine (GUAIFENESIN AC) 100-10 MG/5ML liquid Take 10 mL by mouth daily at bedtime as needed for cough 120 mL 0   • hydrochlorothiazide (HYDRODIURIL) 25 mg tablet Take 12.5 mg by mouth daily     • levalbuterol (XOPENEX HFA) 45 mcg/act inhaler Inhale every 6 (six) hours as needed     • lidocaine (LIDODERM) 5 % APPLY 1 PATCH ON SKIN DAILY FOR PAIN. DO NOT WEAR FOR LONGER THAN 12 HOURS IN ANY 24 HOUR PERIOD. WASH YOUR HANDS AFTER APPLYING A PATCH.     • losartan (COZAAR) 50 mg tablet Take 50 mg by mouth daily Patient is taking one pill per day of 50mg.     • melatonin 3 mg Take 3 mg by mouth     • meloxicam (MOBIC) 15 mg tablet Take 1 tablet (15 mg total) by mouth daily As needed 30 tablet 0   • metoprolol succinate (TOPROL-XL) 50 mg 24 hr tablet Take  200 mg by mouth every morning     • montelukast (SINGULAIR) 10 mg tablet Take 10 mg by mouth     • Multiple Vitamin (MULTIVITAMIN PO) TAKE 1 CAP/TAB BY MOUTH DAILY FOR VITAMIN SUPPLEMENT     • omeprazole (PriLOSEC) 20 mg delayed release capsule take 1 capsule by mouth once daily 60 capsule 1   • prazosin (MINIPRESS) 5 mg capsule Take 5 mg by mouth daily at bedtime     • sodium chloride (OCEAN) 0.65 % nasal spray 1 spray into each nostril 4 (four) times a day as needed     • Spacer/Aero-Holding Chambers (BreatheRite Andrew Spacer Adult) MISC Use 1 each     • Spacer/Aero-Holding Chambers (OptiChamber Elicia) MISC        No current facility-administered medications for this visit.     Allergies   Allergen Reactions   • Bee Pollen Other (See Comments)     Itchy eyes/nose, sneezing, clogged ears, increased cough/chest tightness.   • Methotrexate Other (See Comments)     Felt unwell, unsteady   • Moxifloxacin GI Intolerance     W/ GI bleed     • Pollen Extract Other (See Comments)     Itchy eyes/nose, sneezing, clogged ears, increased cough/chest tightness.   • Lisinopril Cough      Immunizations:     Immunization History   Administered Date(s) Administered   • Anthrax 08/19/2004, 09/09/2004, 10/15/2004   • COVID-19 MODERNA VACC 0.5 ML IM 02/26/2021, 03/23/2021, 11/18/2021   • COVID-19 Moderna mRNA Vaccine 12 Yr+ 50 mcg/0.5 mL (Spikevax) 11/13/2023   • H1N1, All Formulations 01/21/2010   • Hep A, adult 12/21/2002, 04/13/2003   • Hep B, adult 08/19/2004, 09/15/2005   • INFLUENZA 11/01/2007, 10/14/2008, 09/21/2009, 01/21/2010, 12/28/2010, 01/31/2012, 01/18/2013, 10/02/2013, 02/18/2015, 10/01/2015, 08/15/2020, 09/30/2021, 10/03/2022   • Influenza Quadrivalent Preservative Free 3 years and older IM 08/28/2020   • Influenza Split 11/05/2004   • Influenza, Seasonal Vaccine, Quadrivalent, Adjuvanted, .5e 10/03/2022   • Influenza, seasonal, injectable 02/18/2015, 10/01/2015, 10/20/2016, 10/05/2017, 11/01/2018, 09/27/2019   • MMR  [birth year]1991   • Meningococcal Polysaccharide (MPSV4) 04/13/2003   • Pneumococcal 10/14/2008, 06/16/2014   • Pneumococcal Conjugate Vaccine 20-valent (Pcv20), Polysace 10/28/2022   • Polio, Unspecified 04/13/2003   • Td (adult), adsorbed 07/22/2000   • Tdap 08/31/2015   • Typhoid, ViCPs 04/13/2003   • Zoster 04/08/2016   • Zoster Vaccine Recombinant 11/07/2018, 02/08/2019      Health Maintenance:         Topic Date Due   • Colorectal Cancer Screening  04/11/2029   • Hepatitis C Screening  Completed         Topic Date Due   • IPV Vaccine (2 of 3 - Adult catch-up series) 05/11/2003   • COVID-19 Vaccine (6 - 2023-24 season) 01/08/2024      Medicare Screening Tests and Risk Assessments:     Robin is here for his Subsequent Wellness visit. Last Medicare Wellness visit information reviewed, patient interviewed, no change since last AWV.     Health Risk Assessment:   Patient rates overall health as fair. Patient feels that their physical health rating is slightly worse. Patient is satisfied with their life. Eyesight was rated as slightly worse. Hearing was rated as slightly worse. Patient feels that their emotional and mental health rating is same. Patients states they are often angry. Patient states they are sometimes unusually tired/fatigued. Pain experienced in the last 7 days has been some. Patient's pain rating has been 4/10. Patient states that he has experienced weight loss or gain in last 6 months. Lost weight    Depression Screening:   PHQ-9 Score: 12      Fall Risk Screening:   In the past year, patient has experienced: no history of falling in past year      Home Safety:  Patient does not have trouble with stairs inside or outside of their home. Patient has working smoke alarms and has working carbon monoxide detector. Home safety hazards include: medications that cause fatigue.     Nutrition:   Current diet is Regular.     Medications:   Patient is currently taking over-the-counter supplements. OTC medications  include: see medication list. Patient is able to manage medications.     Activities of Daily Living (ADLs)/Instrumental Activities of Daily Living (IADLs):   Walk and transfer into and out of bed and chair?: Yes  Dress and groom yourself?: Yes    Bathe or shower yourself?: Yes    Feed yourself? Yes  Do your laundry/housekeeping?: Yes  Manage your money, pay your bills and track your expenses?: Yes  Make your own meals?: Yes    Do your own shopping?: Yes    Durable Medical Equipment Suppliers  Difibulator    Previous Hospitalizations:   Any hospitalizations or ED visits within the last 12 months?: No      Advance Care Planning:   Living will: No    Durable POA for healthcare: No    Advanced directive: No      Cognitive Screening:   Provider or family/friend/caregiver concerned regarding cognition?: No    PREVENTIVE SCREENINGS      Cardiovascular Screening:    General: Screening Not Indicated, History Lipid Disorder and Risks and Benefits Discussed    Due for: Lipid Panel      Diabetes Screening:     General: Screening Current and Risks and Benefits Discussed    Due for: Blood Glucose      Colorectal Cancer Screening:     General: Screening Current    Due for: Colonoscopy - High Risk      Prostate Cancer Screening:    General: Screening Current and Risks and Benefits Discussed    Due for: PSA      Osteoporosis Screening:    General: Patient Declines and Risks and Benefits Discussed      Abdominal Aortic Aneurysm (AAA) Screening:    Risk factors include: age between 65-76 yo and tobacco use        General: Risks and Benefits Discussed and Screening Current      Lung Cancer Screening:     General: Screening Not Indicated      Hepatitis C Screening:    General: Screening Current    Screening, Brief Intervention, and Referral to Treatment (SBIRT)    Screening  Typical number of drinks in a day: 0  Typical number of drinks in a week: 0  Interpretation: Low risk drinking behavior.    AUDIT-C Screenin) How often did you  "have a drink containing alcohol in the past year? never  2) How many drinks did you have on a typical day when you were drinking in the past year? 0  3) How often did you have 6 or more drinks on one occasion in the past year? never    AUDIT-C Score: 0  Interpretation: Score 0-3 (male): Negative screen for alcohol misuse    Single Item Drug Screening:  How often have you used an illegal drug (including marijuana) or a prescription medication for non-medical reasons in the past year? never    Single Item Drug Screen Score: 0  Interpretation: Negative screen for possible drug use disorder    Brief Intervention  Alcohol & drug use screenings were reviewed. No concerns regarding substance use disorder identified.     No results found.     Physical Exam:     /80   Pulse 62   Temp 97.6 °F (36.4 °C)   Resp 18   Ht 5' 4\" (1.626 m)   Wt 71.8 kg (158 lb 3.2 oz)   BMI 27.15 kg/m²     Physical Exam  Constitutional:       Appearance: He is well-developed.   HENT:      Head: Normocephalic and atraumatic.      Right Ear: External ear normal.      Left Ear: External ear normal.      Nose: Nose normal.   Eyes:      Conjunctiva/sclera: Conjunctivae normal.      Pupils: Pupils are equal, round, and reactive to light.   Neck:      Thyroid: No thyromegaly.   Cardiovascular:      Rate and Rhythm: Normal rate and regular rhythm.      Heart sounds: Normal heart sounds.   Pulmonary:      Effort: Pulmonary effort is normal.      Breath sounds: Normal breath sounds. No wheezing.   Abdominal:      General: Bowel sounds are normal. There is no distension.      Palpations: Abdomen is soft. There is no mass.      Tenderness: There is no abdominal tenderness. There is no guarding.   Musculoskeletal:         General: No tenderness. Normal range of motion.      Cervical back: Normal range of motion and neck supple.   Skin:     General: Skin is warm and dry.      Capillary Refill: Capillary refill takes less than 2 seconds.      Findings: " No erythema.   Neurological:      Mental Status: He is alert and oriented to person, place, and time.   Psychiatric:         Behavior: Behavior normal.         Thought Content: Thought content normal.         Judgment: Judgment normal.          Frank Lombardi, DO

## 2024-07-03 PROBLEM — Z86.010 HX OF COLONIC POLYPS: Status: ACTIVE | Noted: 2022-02-23

## 2024-07-03 PROBLEM — Z86.0100 HX OF COLONIC POLYPS: Status: ACTIVE | Noted: 2022-02-23

## 2024-07-03 PROBLEM — Z86.010 HX OF COLONIC POLYPS: Status: ACTIVE | Noted: 2024-07-03

## 2024-07-03 PROBLEM — Z86.0100 HX OF COLONIC POLYPS: Status: ACTIVE | Noted: 2024-07-03

## 2024-07-30 ENCOUNTER — OFFICE VISIT (OUTPATIENT)
Dept: CARDIOLOGY CLINIC | Facility: CLINIC | Age: 69
End: 2024-07-30
Payer: MEDICARE

## 2024-07-30 ENCOUNTER — LAB (OUTPATIENT)
Dept: LAB | Facility: CLINIC | Age: 69
End: 2024-07-30
Payer: MEDICARE

## 2024-07-30 VITALS
OXYGEN SATURATION: 99 % | DIASTOLIC BLOOD PRESSURE: 88 MMHG | SYSTOLIC BLOOD PRESSURE: 140 MMHG | HEART RATE: 65 BPM | WEIGHT: 163 LBS | HEIGHT: 64 IN | BODY MASS INDEX: 27.83 KG/M2

## 2024-07-30 DIAGNOSIS — Z95.818 STATUS POST PLACEMENT OF IMPLANTABLE LOOP RECORDER: ICD-10-CM

## 2024-07-30 DIAGNOSIS — E78.2 MIXED HYPERLIPIDEMIA: ICD-10-CM

## 2024-07-30 DIAGNOSIS — I1A.0 RESISTANT HYPERTENSION: ICD-10-CM

## 2024-07-30 DIAGNOSIS — I47.19 ATRIAL TACHYCARDIA: ICD-10-CM

## 2024-07-30 DIAGNOSIS — Z95.1 HISTORY OF CORONARY ARTERY BYPASS GRAFT: ICD-10-CM

## 2024-07-30 DIAGNOSIS — D86.85 CARDIAC SARCOIDOSIS: ICD-10-CM

## 2024-07-30 DIAGNOSIS — I10 HYPERTENSION, UNSPECIFIED TYPE: ICD-10-CM

## 2024-07-30 DIAGNOSIS — Z12.5 SCREENING FOR PROSTATE CANCER: ICD-10-CM

## 2024-07-30 DIAGNOSIS — R73.09 ABNORMAL GLUCOSE: ICD-10-CM

## 2024-07-30 DIAGNOSIS — R00.1 BRADYCARDIA: ICD-10-CM

## 2024-07-30 DIAGNOSIS — Z95.810 S/P ICD (INTERNAL CARDIAC DEFIBRILLATOR) PROCEDURE: ICD-10-CM

## 2024-07-30 DIAGNOSIS — Z95.1 HISTORY OF CORONARY ARTERY BYPASS GRAFT: Primary | ICD-10-CM

## 2024-07-30 LAB
ALBUMIN SERPL BCG-MCNC: 4.1 G/DL (ref 3.5–5)
ALP SERPL-CCNC: 90 U/L (ref 34–104)
ALT SERPL W P-5'-P-CCNC: 18 U/L (ref 7–52)
ANION GAP SERPL CALCULATED.3IONS-SCNC: 7 MMOL/L (ref 4–13)
AST SERPL W P-5'-P-CCNC: 16 U/L (ref 13–39)
BILIRUB SERPL-MCNC: 0.61 MG/DL (ref 0.2–1)
BUN SERPL-MCNC: 19 MG/DL (ref 5–25)
CALCIUM SERPL-MCNC: 9.3 MG/DL (ref 8.4–10.2)
CHLORIDE SERPL-SCNC: 106 MMOL/L (ref 96–108)
CHOLEST SERPL-MCNC: 154 MG/DL
CO2 SERPL-SCNC: 29 MMOL/L (ref 21–32)
CREAT SERPL-MCNC: 0.94 MG/DL (ref 0.6–1.3)
ERYTHROCYTE [DISTWIDTH] IN BLOOD BY AUTOMATED COUNT: 15 % (ref 11.6–15.1)
EST. AVERAGE GLUCOSE BLD GHB EST-MCNC: 137 MG/DL
GFR SERPL CREATININE-BSD FRML MDRD: 82 ML/MIN/1.73SQ M
GLUCOSE P FAST SERPL-MCNC: 93 MG/DL (ref 65–99)
HBA1C MFR BLD: 6.4 %
HCT VFR BLD AUTO: 43.3 % (ref 36.5–49.3)
HDLC SERPL-MCNC: 42 MG/DL
HGB BLD-MCNC: 13.4 G/DL (ref 12–17)
LDLC SERPL CALC-MCNC: 87 MG/DL (ref 0–100)
MCH RBC QN AUTO: 25 PG (ref 26.8–34.3)
MCHC RBC AUTO-ENTMCNC: 30.9 G/DL (ref 31.4–37.4)
MCV RBC AUTO: 81 FL (ref 82–98)
PLATELET # BLD AUTO: 352 THOUSANDS/UL (ref 149–390)
PMV BLD AUTO: 10.4 FL (ref 8.9–12.7)
POTASSIUM SERPL-SCNC: 4.2 MMOL/L (ref 3.5–5.3)
PROT SERPL-MCNC: 7.6 G/DL (ref 6.4–8.4)
PSA SERPL-MCNC: 2.03 NG/ML (ref 0–4)
RBC # BLD AUTO: 5.35 MILLION/UL (ref 3.88–5.62)
SODIUM SERPL-SCNC: 142 MMOL/L (ref 135–147)
TRIGL SERPL-MCNC: 124 MG/DL
WBC # BLD AUTO: 9.74 THOUSAND/UL (ref 4.31–10.16)

## 2024-07-30 PROCEDURE — 83036 HEMOGLOBIN GLYCOSYLATED A1C: CPT

## 2024-07-30 PROCEDURE — 80061 LIPID PANEL: CPT

## 2024-07-30 PROCEDURE — 80053 COMPREHEN METABOLIC PANEL: CPT

## 2024-07-30 PROCEDURE — 85027 COMPLETE CBC AUTOMATED: CPT

## 2024-07-30 PROCEDURE — G0103 PSA SCREENING: HCPCS

## 2024-07-30 PROCEDURE — 93000 ELECTROCARDIOGRAM COMPLETE: CPT | Performed by: INTERNAL MEDICINE

## 2024-07-30 PROCEDURE — 83704 LIPOPROTEIN BLD QUAN PART: CPT

## 2024-07-30 PROCEDURE — 99214 OFFICE O/P EST MOD 30 MIN: CPT | Performed by: INTERNAL MEDICINE

## 2024-07-30 PROCEDURE — 36415 COLL VENOUS BLD VENIPUNCTURE: CPT

## 2024-07-30 RX ORDER — IBUPROFEN 600 MG/1
600 TABLET ORAL EVERY 6 HOURS PRN
COMMUNITY
Start: 2024-07-09

## 2024-07-30 RX ORDER — ALBUTEROL SULFATE 2.5 MG/3ML
2.5 SOLUTION RESPIRATORY (INHALATION)
COMMUNITY
Start: 2024-03-13

## 2024-07-30 RX ORDER — ALBUTEROL SULFATE 90 UG/1
2 AEROSOL, METERED RESPIRATORY (INHALATION)
COMMUNITY
Start: 2024-03-13 | End: 2025-03-13

## 2024-07-30 RX ORDER — METOPROLOL SUCCINATE 50 MG/1
50 TABLET, EXTENDED RELEASE ORAL EVERY MORNING
Qty: 90 TABLET | Refills: 1 | Status: SHIPPED | COMMUNITY
Start: 2024-07-30

## 2024-07-30 RX ORDER — LOSARTAN POTASSIUM 100 MG/1
100 TABLET ORAL DAILY
COMMUNITY
Start: 2024-06-22

## 2024-07-30 RX ORDER — METOPROLOL SUCCINATE 25 MG/1
25 TABLET, EXTENDED RELEASE ORAL EVERY MORNING
Qty: 90 TABLET | Refills: 1 | Status: SHIPPED | COMMUNITY
Start: 2024-07-30

## 2024-07-30 RX ORDER — POLYVINYL ALCOHOL 14 MG/ML
1 SOLUTION/ DROPS OPHTHALMIC 4 TIMES DAILY
COMMUNITY
Start: 2024-05-09 | End: 2025-01-17

## 2024-07-30 RX ORDER — METOPROLOL SUCCINATE 25 MG/1
25 TABLET, EXTENDED RELEASE ORAL DAILY
COMMUNITY
Start: 2024-05-28 | End: 2024-07-30 | Stop reason: SDUPTHER

## 2024-07-30 NOTE — PROGRESS NOTES
[facility]  [id number]   [place] [address]  Cardiology Consultation    Robin Stephens  [id number]  1955      Consult for:CABG/Sarcoidosis  Appreciate consult by: Frank Lombardi, DO    1. History of coronary artery bypass graft - CABG x2 in 2007  POCT ECG      2. Status post placement of implantable loop recorder - bradycardia  POCT ECG      3. Bradycardia  POCT ECG      4. Resistant hypertension  POCT ECG      5. S/P ICD (internal cardiac defibrillator) procedure  POCT ECG      6. Cardiac sarcoidosis  POCT ECG         Discussion/Summary:   Palpitations/previous bradycardia/suspected Cardiac Sarcoidosis- reviewed patient's MRI and PET scan report.  Echo 2D with normal ejection fraction and mild concentric hypertrophy. S/P ICD placement. He could not tolerate methotextrate. He is also on the bactrim + folic acid.  Followed at outside advanced heart failure center. Weaned off prednisone. Negative pet 11/2023    Coronary artery disease status post prior bypass grafting- his LDL is not at goal. Last echo 2D with normal ejection fraction wall motion.  Restart atorvastatin 80mg and aspirin.  We will qualify him for Leqvio injection.    Atrial tachycardia s/p ablation- metoprolol 100mg currently    Hypertension- controlled on hydrochlorothiazide 25 mg and amlodipine 10 mg every evening. Losartan 100mg Discussed with patient by wearing compression when sitting standing    PTSD- currently on SSRI    HPI:   66-year-old disabled  history of coronary artery bypass grafting 2 vessel in 2007, hypertension with recent workup at an outside not work for episodes of tachy-holland with advanced imaging suggestive of a inflammatory and infiltrative process.  He has plans for a defibrillator placement.  He still has periodic tachycardia episodes.  He denies having syncope.  He states not being as active currently.  Denies having any severe chest discomfort.  There is no  history of atrial fibrillation.  He is compliant with his medications.  He denies having any fevers or chills.  Denies any recent viral symptoms.  Denies having any nausea vomiting diarrhea.    10/7: Denies dizziness or chest pain. Currently followed at [facility] advanced heart failure.  He was not able to tolerate methotrexate.  He is currently on prednisone.  His pacemaker pocket is well healed.    4/28/23:  Still with some shortness of breath.  Denies having chest heaviness.  Denies having any device firing.  We discussed about his lipid panel.    10/13/2023: He was having increased palpitations and his metoprolol was increased.  We reviewed through his abnormal blood work.  He unfortunately ran out of his atorvastatin.  He is currently intermittently atrial paced with occasional PVCs. He joined the gym- lower back pain/no dyspnea    Recent Visit: Denies having chest pain or major change in breathing. Denies significant palpitations. Compliant with therapy. Reviewed labwork together.       PMH  CABG- 2 vessel [place]- severe stabbing pain    Social Hx  - [place]  Disabled   [facility]  All Doctors were in [facility]  Not much physical activity  Former smoking- 10 years-- 1-2 cig light- 2 years a pack    Family Hx  Sister- 70- couple of heart attack. No hx of pacemaker    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[employer]    Past Medical History:   Diagnosis Date    Alcohol dependence, uncomplicated (HCC) 10/28/2022    Coronary artery disease     Hyperlipemia     Hypertension     Irregular heart beat     bradycardia     Social History     Socioeconomic History    Marital status: /Civil Union     Spouse name: Not on file    Number of children: Not on file    Years of education: Not on file    Highest education level: Not on file   Occupational History    Not on file   Tobacco Use    Smoking status: Former     Current packs/day: 0.25     Average packs/day: 0.3 packs/day for 55.6 years (13.9 ttl pk-yrs)     Types:  Cigarettes     Start date: 1969     Passive exposure: Past    Smokeless tobacco: Never   Vaping Use    Vaping status: Never Used   Substance and Sexual Activity    Alcohol use: Never    Drug use: Never    Sexual activity: Not on file   Other Topics Concern    Not on file   Social History Narrative    Not on file     Social Determinants of Health     Financial Resource Strain: Low Risk  (2/5/2024)    Overall Financial Resource Strain (CARDIA)     Difficulty of Paying Living Expenses: Not hard at all   Food Insecurity: Not on file   Transportation Needs: No Transportation Needs (2/5/2024)    PRAPARE - Transportation     Lack of Transportation (Medical): No     Lack of Transportation (Non-Medical): No   Physical Activity: Not on file   Stress: Not on file   Social Connections: Not on file   Intimate Partner Violence: Not on file   Housing Stability: Not on file      Family History   Problem Relation Age of Onset    Arthritis Mother     Leukemia Maternal Aunt     Mental illness Neg Hx      Past Surgical History:   Procedure Laterality Date    ANTERIOR CRUCIATE LIGAMENT REPAIR Left 2001    CARDIAC LOOP RECORDER  03/2022    MD from [facility] possible pacer placement    CARDIAC SURGERY  2007    CABG x2       Current Outpatient Medications:     albuterol (2.5 mg/3 mL) 0.083 % nebulizer solution, Inhale 2.5 mg, Disp: , Rfl:     albuterol (PROVENTIL HFA,VENTOLIN HFA) 90 mcg/act inhaler, Inhale 2 puffs, Disp: , Rfl:     amLODIPine (NORVASC) 10 mg tablet, Take 10 mg by mouth every evening  , Disp: , Rfl:     aspirin (ECOTRIN LOW STRENGTH) 81 mg EC tablet, TAKE ONE TABLET BY MOUTH DAILY FOR THE HEART., Disp: , Rfl:     atorvastatin (LIPITOR) 80 mg tablet, Take 1 tablet (80 mg total) by mouth in the morning, Disp: 90 tablet, Rfl: 3    buPROPion (WELLBUTRIN XL) 150 mg 24 hr tablet, Take 150 mg by mouth  , Disp: , Rfl:     Cholecalciferol (VITAMIN D-3 PO), TAKE ONE TABLET BY MOUTH DAILY FOR VITAMIN D SUPPLEMENTATION, Disp: , Rfl:      cyclobenzaprine (FLEXERIL) 10 mg tablet, Take 10 mg by mouth daily as needed  , Disp: , Rfl:     escitalopram (LEXAPRO) 20 mg tablet, Take 20 mg by mouth  , Disp: , Rfl:     Flovent  MCG/ACT inhaler, , Disp: , Rfl:     fluticasone (FLONASE) 50 mcg/act nasal spray, as needed, Disp: , Rfl:     folic acid (FOLVITE) 1 mg tablet, 1 mg, Disp: , Rfl:     furosemide (LASIX) 20 mg tablet, if needed, Disp: , Rfl:     hydrochlorothiazide (HYDRODIURIL) 25 mg tablet, Take 12.5 mg by mouth daily, Disp: , Rfl:     ibuprofen (MOTRIN) 600 mg tablet, Take 600 mg by mouth every 6 (six) hours as needed for moderate pain, Disp: , Rfl:     levalbuterol (XOPENEX HFA) 45 mcg/act inhaler, Inhale every 6 (six) hours as needed, Disp: , Rfl:     lidocaine (LIDODERM) 5 %, APPLY 1 PATCH ON SKIN DAILY FOR PAIN. DO NOT WEAR FOR LONGER THAN 12 HOURS IN ANY 24 HOUR PERIOD. WASH YOUR HANDS AFTER APPLYING A PATCH., Disp: , Rfl:     losartan (COZAAR) 100 MG tablet, Take 100 mg by mouth daily, Disp: , Rfl:     melatonin 3 mg, Take 3 mg by mouth, Disp: , Rfl:     metoprolol succinate (TOPROL-XL) 25 mg 24 hr tablet, Take 25 mg by mouth daily, Disp: , Rfl:     montelukast (SINGULAIR) 10 mg tablet, Take 10 mg by mouth, Disp: , Rfl:     Multiple Vitamin (MULTIVITAMIN PO), TAKE 1 CAP/TAB BY MOUTH DAILY FOR VITAMIN SUPPLEMENT, Disp: , Rfl:     omeprazole (PriLOSEC) 20 mg delayed release capsule, take 1 capsule by mouth once daily, Disp: 60 capsule, Rfl: 1    polyvinyl alcohol (LIQUIFILM TEARS) 1.4 % ophthalmic solution, Administer 1 drop to both eyes 4 (four) times a day, Disp: , Rfl:     prazosin (MINIPRESS) 5 mg capsule, Take 5 mg by mouth daily at bedtime, Disp: , Rfl:     sodium chloride (OCEAN) 0.65 % nasal spray, 1 spray into each nostril 4 (four) times a day as needed, Disp: , Rfl:     Spacer/Aero-Holding Chambers (BreatheRite Andrew Spacer Adult) MISC, Use 1 each, Disp: , Rfl:     Spacer/Aero-Holding Chambers (OptiChamber Leicia) MISC, , Disp: ,  "Rfl:     budesonide-formoterol (SYMBICORT) 160-4.5 mcg/act inhaler, Inhale 2 puffs 2 (two) times a day, Disp: , Rfl:     guaifenesin-codeine (GUAIFENESIN AC) 100-10 MG/5ML liquid, Take 10 mL by mouth daily at bedtime as needed for cough, Disp: 120 mL, Rfl: 0    meloxicam (MOBIC) 15 mg tablet, Take 1 tablet (15 mg total) by mouth daily As needed, Disp: 30 tablet, Rfl: 0    metoprolol succinate (TOPROL-XL) 50 mg 24 hr tablet, Take 200 mg by mouth every morning, Disp: , Rfl:   Allergies   Allergen Reactions    Bee Pollen Other (See Comments)     Itchy eyes/nose, sneezing, clogged ears, increased cough/chest tightness.    Methotrexate Other (See Comments)     Felt unwell, unsteady    Moxifloxacin GI Intolerance     W/ GI bleed      Pollen Extract Other (See Comments)     Itchy eyes/nose, sneezing, clogged ears, increased cough/chest tightness.    Lisinopril Cough     Vitals:    07/30/24 0813   Height: 5' 4\" (1.626 m)       Review of Systems:   Review of Systems   Respiratory:  Negative for chest tightness.    Cardiovascular:  Positive for palpitations.       Vitals:    07/30/24 0813   Height: 5' 4\" (1.626 m)     Physical Examination:   Physical Exam  Constitutional:       General: He is not in acute distress.     Appearance: He is well-developed. He is not diaphoretic.   HENT:      Head: Normocephalic and atraumatic.      Right Ear: External ear normal.      Left Ear: External ear normal.   Eyes:      General: No scleral icterus.        Right eye: No discharge.         Left eye: No discharge.      Conjunctiva/sclera: Conjunctivae normal.      Pupils: Pupils are equal, round, and reactive to light.   Neck:      Thyroid: No thyromegaly.      Vascular: No JVD.      Trachea: No tracheal deviation.   Cardiovascular:      Rate and Rhythm: Normal rate and regular rhythm.      Heart sounds: Murmur heard.      No friction rub. Gallop present.      Comments: ICD well-healed  Pulmonary:      Effort: Pulmonary effort is normal. No " respiratory distress.      Breath sounds: Normal breath sounds. No stridor. No wheezing or rales.   Chest:      Chest wall: No tenderness.   Abdominal:      General: Bowel sounds are normal. There is no distension.      Palpations: Abdomen is soft. There is no mass.      Tenderness: There is no abdominal tenderness. There is no guarding or rebound.   Musculoskeletal:         General: No tenderness or deformity. Normal range of motion.      Cervical back: Normal range of motion and neck supple.   Skin:     General: Skin is warm and dry.      Coloration: Skin is not pale.      Findings: No erythema or rash.   Neurological:      Mental Status: He is alert and oriented to person, place, and time.      Cranial Nerves: No cranial nerve deficit.      Motor: No abnormal muscle tone.      Coordination: Coordination normal.      Deep Tendon Reflexes: Reflexes are normal and symmetric. Reflexes normal.   Psychiatric:         Behavior: Behavior normal.         Thought Content: Thought content normal.         Judgment: Judgment normal.         Labs:     Lab Results   Component Value Date    WBC 9.30 01/12/2024    HGB 13.9 01/12/2024    HCT 43.3 01/12/2024    MCV 80 (L) 01/12/2024    RDW 15.5 (H) 01/12/2024     01/12/2024     BMP:  Lab Results   Component Value Date    SODIUM 143 01/12/2024    K 4.2 01/12/2024     01/12/2024    CO2 28 01/12/2024    BUN 30 (H) 01/12/2024    CREATININE 1.19 01/12/2024    GLUC 106 09/26/2022    GLUF 96 01/12/2024    CALCIUM 9.5 01/12/2024    CORRECTEDCA 9.9 10/17/2022    EGFR 62 01/12/2024     LFT:  Lab Results   Component Value Date    AST 21 01/12/2024    ALT 24 01/12/2024    ALKPHOS 83 01/12/2024    TP 7.5 01/12/2024    ALB 4.3 01/12/2024      Lipid Profile:   Lab Results   Component Value Date    CHOLESTEROL 173 01/12/2024    CHOLESTEROL 192 01/12/2024    HDL 39 (L) 01/12/2024    HDL 36 (L) 01/12/2024    LDLCALC 106 (H) 01/12/2024    LDLCALC 131 (H) 01/12/2024    TRIG 138 01/12/2024      Lab Results   Component Value Date    CHOLESTEROL 173 01/12/2024    CHOLESTEROL 192 01/12/2024       Imaging & Testing   I have personally reviewed pertinent reports.      Cardiac Testing   Sarcoidosis:   1.  Solitary, small area of mildly increased FDG uptake in basal lateral wall suggesting active inflammation.  The other sites that showed late gadolinium enhancement that are without abnormal FDG activity are likely fibrotic.     2.  Small subpleural nodule associated with right major fissure, too small for metabolic evaluation.  Nonspecific.  Continued dedicated CT followup in 3-6 months recommended, as clinically indicated.     3.  No additional FDG avid tissue.     Cardiac MRI:  1. The LV is normal in size with borderline systolic function; LVEF 52%.   2. RV is mildly dilated (RVEDVI of 91 ml/sq m) with mildly diminished systolic function (RVEF of 37%).   3. Patchy areas of LGE at the basal septum at the superior and inferior LV - RV junction and mid LV inferior LV - RV junction, that could represent nonspecific myocardial fibrosis.   4. 2.8 x 2.2 cm enhancing lesion in the liver that is incompletely evaluated on this cardiac focussed examination. Recommend dedicated liver MRI for further evaluation.    CT Chest 8/2020: IMPRESSION:   1.  Mild widespread bronchiectasis.   2.  No significant bronchial wall thickening or air trapping.   3.  Nonspecific pulmonary nodule measuring up to 5 x 4 mm.   4.  Nonspecific hypodense right hepatic lobe 3 cm lesion.      1. No evidence of myocardial inflammation/sarcoidosis.     2. Stable right upper lobe perifissural nodule, below resolution of PET.     3. New asymmetric activity at the left adrenal gland, correlate to MRI.     4. New FDG-avid focus at the anterior aspect of the left testis, correlate to ultrasound.     5. New mild diffuse activity throughout the thyroid gland, probably related to thyroiditis.     6. Stable mildly FDG-avid bilateral hilar foci, stable  "mild FDG-avidity at the gastroesophageal junction, stable mild FDG-avidity along the median sternotomy, probably inflammatory.     7. Stable heterogeneous activity throughout the osseous structures including stable mildly FDG-avid focus in the proximal right femur without CT correlate, possibly.           EKG: Personally reviewed.    Normal sinus rhythm bifascicular heart block no acute ST changes  Atrial paced rhythm with periodic pvc    Martin Glasgow MD [facility] [place]  [phone]  Please call with any questions or suggestions    Counseling :  A description of the counseling:  leqvio  Goals and Barriers:  Patient's ability to self care:  Medication side effect reviewed with patient in detail and all their questions answered.    \"Portions of the record may have been created with voice recognition software. Occasional wrong word or \"sound a like\" substitutions may have occurred due to the inherent limitations of voice recognition software. Read the chart carefully and recognize, using context, where substitutions have occurred. Please call if you have any questions. \"    "

## 2024-08-01 LAB
CHOLEST SERPL-MCNC: 164 MG/DL (ref 100–199)
HDL SERPL-SCNC: 30.4 UMOL/L
HDLC SERPL-MCNC: 45 MG/DL
LDL SERPL QN: 20.5 NM
LDL SERPL QN: 608 NMOL/L
LDL SERPL-SCNC: 1208 NMOL/L
LDLC SERPL CALC-MCNC: 97 MG/DL (ref 0–99)
LP-IR SCORE SERPL: 71
TRIGL SERPL-MCNC: 124 MG/DL (ref 0–149)

## 2024-08-02 ENCOUNTER — TELEPHONE (OUTPATIENT)
Dept: CARDIOLOGY CLINIC | Facility: CLINIC | Age: 69
End: 2024-08-02

## 2024-08-02 NOTE — TELEPHONE ENCOUNTER
----- Message from Martin Glasgow MD sent at 8/2/2024 10:47 AM EDT -----  LDL particle still not at goal. Recommend trying the cholesterol lowering injection. LDL-P 1208- want below 1000. LDL 97- want below 70

## 2024-08-02 NOTE — TELEPHONE ENCOUNTER
Lvm to return call and have further discussion regarding injectable cholesterol medication due to lab results.

## 2024-08-02 NOTE — TELEPHONE ENCOUNTER
Spoke with patient, he reported that he filled out leqvio from when he was in the office.   Do you have this on file?

## 2024-08-05 ENCOUNTER — TELEPHONE (OUTPATIENT)
Dept: CARDIOLOGY CLINIC | Facility: CLINIC | Age: 69
End: 2024-08-05

## 2024-08-05 NOTE — TELEPHONE ENCOUNTER
Patient called back - I provided $700 OOP expense for him and that is a little out of his budget.   He was hesitant in believing that  didn't  the rest of the cost. So I provided  number from the Statement sheet as well as the codes used and the reference number for the benefit check.  He will also call  and see if he can get any further information before moving forward with the Leqvio injection.

## 2024-08-05 NOTE — TELEPHONE ENCOUNTER
Leqvio forms submitted for Statement of Benefits    Primary-medicare part b-will cover 80% after $240 deductible. Pt has met his deductible for this year    Secondary- for life-we are unable to obtain coverage information     Called pt to inform him of coverage and see if he was interested in moving forward with medication.  LM for pt to call back

## 2024-08-19 ENCOUNTER — OFFICE VISIT (OUTPATIENT)
Dept: FAMILY MEDICINE CLINIC | Facility: CLINIC | Age: 69
End: 2024-08-19
Payer: MEDICARE

## 2024-08-19 VITALS
WEIGHT: 163 LBS | TEMPERATURE: 97.3 F | HEART RATE: 68 BPM | HEIGHT: 64 IN | RESPIRATION RATE: 18 BRPM | DIASTOLIC BLOOD PRESSURE: 82 MMHG | SYSTOLIC BLOOD PRESSURE: 136 MMHG | BODY MASS INDEX: 27.83 KG/M2

## 2024-08-19 DIAGNOSIS — R73.03 PREDIABETES: ICD-10-CM

## 2024-08-19 DIAGNOSIS — E78.2 MIXED HYPERLIPIDEMIA: ICD-10-CM

## 2024-08-19 DIAGNOSIS — Z13.6 SCREENING FOR CARDIOVASCULAR CONDITION: ICD-10-CM

## 2024-08-19 DIAGNOSIS — M54.50 CHRONIC LOW BACK PAIN, UNSPECIFIED BACK PAIN LATERALITY, UNSPECIFIED WHETHER SCIATICA PRESENT: ICD-10-CM

## 2024-08-19 DIAGNOSIS — R97.20 INCREASED PROSTATE SPECIFIC ANTIGEN (PSA) VELOCITY: ICD-10-CM

## 2024-08-19 DIAGNOSIS — I10 HYPERTENSION, UNSPECIFIED TYPE: Primary | ICD-10-CM

## 2024-08-19 DIAGNOSIS — H93.13 TINNITUS, BILATERAL: ICD-10-CM

## 2024-08-19 DIAGNOSIS — G89.29 CHRONIC LOW BACK PAIN, UNSPECIFIED BACK PAIN LATERALITY, UNSPECIFIED WHETHER SCIATICA PRESENT: ICD-10-CM

## 2024-08-19 DIAGNOSIS — Z12.5 SCREENING FOR PROSTATE CANCER: ICD-10-CM

## 2024-08-19 PROCEDURE — G2211 COMPLEX E/M VISIT ADD ON: HCPCS | Performed by: FAMILY MEDICINE

## 2024-08-19 PROCEDURE — 99214 OFFICE O/P EST MOD 30 MIN: CPT | Performed by: FAMILY MEDICINE

## 2024-08-19 NOTE — PROGRESS NOTES
Assessment/Plan:    1. Hypertension, unspecified type  -     CBC; Future  2. Mixed hyperlipidemia  -     Comprehensive metabolic panel; Future; Expected date: 01/31/2025  -     CBC; Future  -     Lipid Panel with Direct LDL reflex; Future; Expected date: 01/31/2025  3. Prediabetes  Assessment & Plan:  Decrease intake suger  Increasing activity   Weight loss  Orders:  -     Comprehensive metabolic panel; Future; Expected date: 01/31/2025  -     CBC; Future  -     Hemoglobin A1C; Future; Expected date: 01/31/2025  4. Increased prostate specific antigen (PSA) velocity  -     CBC; Future  -     PSA, Total Screen; Future  5. Tinnitus, bilateral  -     CBC; Future  6. Screening for cardiovascular condition  -     CBC; Future  7. Screening for prostate cancer  -     CBC; Future  -     PSA, Total Screen; Future  8. Chronic low back pain, unspecified back pain laterality, unspecified whether sciatica present  -     Elastic Bandages & Supports ([employer] Lumbar/Back Support Brace) MISC; Use daily          There are no Patient Instructions on file for this visit.    Return in about 6 months (around 2/19/2025).    Subjective:      Patient ID: Robin Stephens is a 68 y.o. male.    Chief Complaint   Patient presents with   • Follow-up     YC       Pt is sched for a 6 month follow up  Pt states he does urinate some at night  Pt also finds he has ringing in ears - [place] was [place] [facility] be doen    Pt states he has pain in his lower back states he is in physical therapy - states they suggest lumbar support - states they did not have nay there for him        The following portions of the patient's history were reviewed and updated as appropriate: allergies, current medications, past family history, past medical history, past social history, past surgical history and problem list.    Review of Systems   Constitutional:  Negative for activity change, appetite change, chills, diaphoresis, fatigue, fever  and unexpected weight change.   HENT:  Positive for tinnitus. Negative for congestion, dental problem, ear pain, mouth sores, sinus pressure, sinus pain, sore throat and trouble swallowing.    Eyes:  Negative for photophobia, discharge and itching.   Respiratory:  Negative for apnea, chest tightness and shortness of breath.    Cardiovascular:  Negative for chest pain, palpitations and leg swelling.   Gastrointestinal:  Negative for abdominal distention, abdominal pain, blood in stool, nausea and vomiting.   Endocrine: Negative for cold intolerance, heat intolerance, polydipsia, polyphagia and polyuria.   Genitourinary:  Negative for difficulty urinating.   Musculoskeletal:  Positive for back pain. Negative for arthralgias.   Skin:  Negative for color change and wound.   Neurological:  Negative for dizziness, syncope, speech difficulty and headaches.   Hematological:  Negative for adenopathy.   Psychiatric/Behavioral:  Negative for agitation and behavioral problems.          Current Outpatient Medications   Medication Sig Dispense Refill   • albuterol (PROVENTIL HFA,VENTOLIN HFA) 90 mcg/act inhaler Inhale 2 puffs     • amLODIPine (NORVASC) 10 mg tablet Take 10 mg by mouth every evening       • aspirin (ECOTRIN LOW STRENGTH) 81 mg EC tablet TAKE ONE TABLET BY MOUTH DAILY FOR THE HEART.     • atorvastatin (LIPITOR) 80 mg tablet Take 1 tablet (80 mg total) by mouth in the morning 90 tablet 3   • budesonide-formoterol (SYMBICORT) 160-4.5 mcg/act inhaler Inhale 2 puffs 2 (two) times a day     • buPROPion (WELLBUTRIN XL) 150 mg 24 hr tablet Take 150 mg by mouth       • Cholecalciferol (VITAMIN D-3 PO) TAKE ONE TABLET BY MOUTH DAILY FOR VITAMIN D SUPPLEMENTATION     • cyclobenzaprine (FLEXERIL) 10 mg tablet Take 10 mg by mouth daily as needed       • Elastic Bandages & Supports ([employer] Lumbar/Back Support Brace) MISC Use daily 1 each 0   • escitalopram (LEXAPRO) 20 mg tablet Take 20 mg by mouth       • fluticasone (FLONASE) 50  "mcg/act nasal spray as needed     • folic acid (FOLVITE) 1 mg tablet 1 mg     • furosemide (LASIX) 20 mg tablet if needed     • hydrochlorothiazide (HYDRODIURIL) 25 mg tablet Take 12.5 mg by mouth daily     • ibuprofen (MOTRIN) 600 mg tablet Take 600 mg by mouth every 6 (six) hours as needed for moderate pain     • lidocaine (LIDODERM) 5 % APPLY 1 PATCH ON SKIN DAILY FOR PAIN. DO NOT WEAR FOR LONGER THAN 12 HOURS IN ANY 24 HOUR PERIOD. WASH YOUR HANDS AFTER APPLYING A PATCH.     • losartan (COZAAR) 100 MG tablet Take 100 mg by mouth daily     • melatonin 3 mg Take 3 mg by mouth     • meloxicam (MOBIC) 15 mg tablet Take 1 tablet (15 mg total) by mouth daily As needed 30 tablet 0   • metoprolol succinate (TOPROL-XL) 25 mg 24 hr tablet Take 1 tablet (25 mg total) by mouth every morning 90 tablet 1   • metoprolol succinate (TOPROL-XL) 50 mg 24 hr tablet Take 1 tablet (50 mg total) by mouth every morning 90 tablet 1   • montelukast (SINGULAIR) 10 mg tablet Take 10 mg by mouth     • Multiple Vitamin (MULTIVITAMIN PO) TAKE 1 CAP/TAB BY MOUTH DAILY FOR VITAMIN SUPPLEMENT     • omeprazole (PriLOSEC) 20 mg delayed release capsule take 1 capsule by mouth once daily 60 capsule 1   • polyvinyl alcohol (LIQUIFILM TEARS) 1.4 % ophthalmic solution Administer 1 drop to both eyes 4 (four) times a day     • prazosin (MINIPRESS) 5 mg capsule Take 5 mg by mouth daily at bedtime     • sodium chloride (OCEAN) 0.65 % nasal spray 1 spray into each nostril 4 (four) times a day as needed     • Spacer/Aero-Holding Chambers (BreatheR[employer] Andrew Spacer Adult) MISC Use 1 each     • Spacer/Aero-Holding Chambers (OptiChamber Elicia) MISC        No current facility-administered medications for this visit.       Objective:    /82   Pulse 68   Temp (!) 97.3 °F (36.3 °C) (Tympanic)   Resp 18   Ht 5' 4\" (1.626 m)   Wt 73.9 kg (163 lb)   BMI 27.98 kg/m²        Physical Exam  Vitals and nursing note reviewed.   Constitutional:       General: He " is not in acute distress.     Appearance: He is well-developed. He is not diaphoretic.   HENT:      Head: Normocephalic and atraumatic.      Right Ear: External ear normal.      Left Ear: External ear normal.      Nose: Nose normal.      Mouth/Throat:      Pharynx: No oropharyngeal exudate.   Eyes:      General: No scleral icterus.        Right eye: No discharge.         Left eye: No discharge.      Pupils: Pupils are equal, round, and reactive to light.   Neck:      Thyroid: No thyromegaly.   Cardiovascular:      Rate and Rhythm: Normal rate.      Heart sounds: Normal heart sounds. No murmur heard.  Pulmonary:      Effort: Pulmonary effort is normal. No respiratory distress.      Breath sounds: Normal breath sounds. No wheezing.   Abdominal:      General: Bowel sounds are normal. There is no distension.      Palpations: Abdomen is soft. There is no mass.      Tenderness: There is no abdominal tenderness. There is no guarding or rebound.   Genitourinary:     Prostate: Normal.      Comments: Prostate no nodules, not particularly enlarged  Musculoskeletal:         General: Normal range of motion.   Skin:     General: Skin is warm and dry.      Findings: No erythema or rash.   Neurological:      Mental Status: He is alert.      Coordination: Coordination normal.      Deep Tendon Reflexes: Reflexes normal.   Psychiatric:         Behavior: Behavior normal.                Frank Lombardi, DO

## 2024-08-31 ENCOUNTER — APPOINTMENT (OUTPATIENT)
Dept: RADIOLOGY | Facility: CLINIC | Age: 69
End: 2024-08-31
Payer: MEDICARE

## 2024-08-31 ENCOUNTER — OFFICE VISIT (OUTPATIENT)
Dept: URGENT CARE | Facility: CLINIC | Age: 69
End: 2024-08-31
Payer: MEDICARE

## 2024-08-31 VITALS
TEMPERATURE: 96.5 F | RESPIRATION RATE: 14 BRPM | OXYGEN SATURATION: 97 % | SYSTOLIC BLOOD PRESSURE: 136 MMHG | BODY MASS INDEX: 28.17 KG/M2 | HEART RATE: 61 BPM | HEIGHT: 64 IN | WEIGHT: 165 LBS | DIASTOLIC BLOOD PRESSURE: 74 MMHG

## 2024-08-31 DIAGNOSIS — B34.9 VIRAL ILLNESS: Primary | ICD-10-CM

## 2024-08-31 DIAGNOSIS — B34.9 VIRAL ILLNESS: ICD-10-CM

## 2024-08-31 LAB
SARS-COV-2 AG UPPER RESP QL IA: NEGATIVE
VALID CONTROL: NORMAL

## 2024-08-31 PROCEDURE — 87811 SARS-COV-2 COVID19 W/OPTIC: CPT | Performed by: PHYSICIAN ASSISTANT

## 2024-08-31 PROCEDURE — 99213 OFFICE O/P EST LOW 20 MIN: CPT | Performed by: PHYSICIAN ASSISTANT

## 2024-08-31 PROCEDURE — 71046 X-RAY EXAM CHEST 2 VIEWS: CPT

## 2024-08-31 RX ORDER — BENZONATATE 100 MG/1
100 CAPSULE ORAL 3 TIMES DAILY PRN
Qty: 20 CAPSULE | Refills: 0 | Status: SHIPPED | OUTPATIENT
Start: 2024-08-31

## 2024-08-31 RX ORDER — BENZONATATE 100 MG/1
100 CAPSULE ORAL 3 TIMES DAILY PRN
Qty: 30 CAPSULE | Status: CANCELLED | OUTPATIENT
Start: 2024-08-31

## 2024-08-31 NOTE — PATIENT INSTRUCTIONS
Could have COVID test.  Clear chest x-ray.  Take the Tessalon for cough.  Use your albuterol inhaler for any chest tightness.

## 2024-08-31 NOTE — PROGRESS NOTES
[facility]        NAME: Robin Stephens is a 68 y.o. male  : 1955    MRN: [id number]  DATE: 2024  TIME: 1:58 PM    Assessment and Plan   Viral illness [B34.9]  1. Viral illness  XR chest pa and lateral    benzonatate (TESSALON PERLES) 100 mg capsule    Poct Covid 19 Rapid Antigen Test        Clear lung sounds.  Patient has a past medical history of asthma.  Reports that he was hearing wheezing at home.  Will do an x-ray to rule out pneumonia.    Patient Instructions     Patient Instructions   Could have COVID test.  Clear chest x-ray.  Take the Tessalon for cough.  Use your albuterol inhaler for any chest tightness.      Follow up with PCP in 3-5 days.  Proceed to  ER if symptoms worsen.    Chief Complaint     Chief Complaint   Patient presents with    Cough     Pt reports chest tightness, cough, runny nose, 2x days         History of Present Illness       The patient is a 68-year-old male with a past medical history of HTN, pre diabetes, cardiomyopathy, atherosclerotic heart disease, hyperlipidemia, coronary artery bypass graft/CABG 2 times, status post placement of an implantable loop recorder, TIA, LAMAR, asthma presenting today for a cough, rhinorrhea, chest congestion and chest tightness for the last 2 days.         Review of Systems   Review of Systems   Constitutional:  Negative for activity change, appetite change, chills, diaphoresis and fever.   HENT:  Positive for congestion and rhinorrhea. Negative for ear pain and sore throat.    Eyes:  Negative for pain and visual disturbance.   Respiratory:  Positive for cough and chest tightness. Negative for shortness of breath.    Cardiovascular:  Negative for chest pain and palpitations.   Gastrointestinal:  Negative for abdominal pain, diarrhea, nausea and vomiting.   Genitourinary:  Negative for dysuria and hematuria.   Musculoskeletal:  Negative for arthralgias, back pain and myalgias.   Skin:  Negative for color change, pallor and  rash.   Neurological:  Negative for seizures, syncope and headaches.   All other systems reviewed and are negative.        Current Medications       Current Outpatient Medications:     benzonatate (TESSALON PERLES) 100 mg capsule, Take 1 capsule (100 mg total) by mouth 3 (three) times a day as needed for cough, Disp: 20 capsule, Rfl: 0    albuterol (PROVENTIL HFA,VENTOLIN HFA) 90 mcg/act inhaler, Inhale 2 puffs, Disp: , Rfl:     amLODIPine (NORVASC) 10 mg tablet, Take 10 mg by mouth every evening  , Disp: , Rfl:     aspirin (ECOTRIN LOW STRENGTH) 81 mg EC tablet, TAKE ONE TABLET BY MOUTH DAILY FOR THE HEART., Disp: , Rfl:     atorvastatin (LIPITOR) 80 mg tablet, Take 1 tablet (80 mg total) by mouth in the morning, Disp: 90 tablet, Rfl: 3    budesonide-formoterol (SYMBICORT) 160-4.5 mcg/act inhaler, Inhale 2 puffs 2 (two) times a day, Disp: , Rfl:     buPROPion (WELLBUTRIN XL) 150 mg 24 hr tablet, Take 150 mg by mouth  , Disp: , Rfl:     Cholecalciferol (VITAMIN D-3 PO), TAKE ONE TABLET BY MOUTH DAILY FOR VITAMIN D SUPPLEMENTATION, Disp: , Rfl:     cyclobenzaprine (FLEXERIL) 10 mg tablet, Take 10 mg by mouth daily as needed  , Disp: , Rfl:     Elastic Bandages & Supports ([employer] Lumbar/Back Support Brace) MISC, Use daily, Disp: 1 each, Rfl: 0    escitalopram (LEXAPRO) 20 mg tablet, Take 20 mg by mouth  , Disp: , Rfl:     fluticasone (FLONASE) 50 mcg/act nasal spray, as needed, Disp: , Rfl:     folic acid (FOLVITE) 1 mg tablet, 1 mg, Disp: , Rfl:     furosemide (LASIX) 20 mg tablet, if needed, Disp: , Rfl:     hydrochlorothiazide (HYDRODIURIL) 25 mg tablet, Take 12.5 mg by mouth daily, Disp: , Rfl:     ibuprofen (MOTRIN) 600 mg tablet, Take 600 mg by mouth every 6 (six) hours as needed for moderate pain, Disp: , Rfl:     lidocaine (LIDODERM) 5 %, APPLY 1 PATCH ON SKIN DAILY FOR PAIN. DO NOT WEAR FOR LONGER THAN 12 HOURS IN ANY 24 HOUR PERIOD. WASH YOUR HANDS AFTER APPLYING A PATCH., Disp: , Rfl:     losartan (COZAAR) 100  MG tablet, Take 100 mg by mouth daily, Disp: , Rfl:     melatonin 3 mg, Take 3 mg by mouth, Disp: , Rfl:     meloxicam (MOBIC) 15 mg tablet, Take 1 tablet (15 mg total) by mouth daily As needed, Disp: 30 tablet, Rfl: 0    metoprolol succinate (TOPROL-XL) 25 mg 24 hr tablet, Take 1 tablet (25 mg total) by mouth every morning, Disp: 90 tablet, Rfl: 1    metoprolol succinate (TOPROL-XL) 50 mg 24 hr tablet, Take 1 tablet (50 mg total) by mouth every morning, Disp: 90 tablet, Rfl: 1    montelukast (SINGULAIR) 10 mg tablet, Take 10 mg by mouth, Disp: , Rfl:     Multiple Vitamin (MULTIVITAMIN PO), TAKE 1 CAP/TAB BY MOUTH DAILY FOR VITAMIN SUPPLEMENT, Disp: , Rfl:     omeprazole (PriLOSEC) 20 mg delayed release capsule, take 1 capsule by mouth once daily, Disp: 60 capsule, Rfl: 1    polyvinyl alcohol (LIQUIFILM TEARS) 1.4 % ophthalmic solution, Administer 1 drop to both eyes 4 (four) times a day, Disp: , Rfl:     prazosin (MINIPRESS) 5 mg capsule, Take 5 mg by mouth daily at bedtime, Disp: , Rfl:     sodium chloride (OCEAN) 0.65 % nasal spray, 1 spray into each nostril 4 (four) times a day as needed, Disp: , Rfl:     Spacer/Aero-Holding Chambers ([employer] Spacer Adult) MISC, Use 1 each, Disp: , Rfl:     Spacer/Aero-Holding Chambers (OptiChamber Elicia) MISC, , Disp: , Rfl:     Current Allergies     Allergies as of 08/31/2024 - Reviewed 08/31/2024   Allergen Reaction Noted    Bee pollen Other (See Comments) 05/06/2019    Methotrexate Other (See Comments) 12/09/2022    Moxifloxacin GI Intolerance 10/21/2009    Pollen extract Other (See Comments) 05/06/2019    Lisinopril Cough 04/06/2009            The following portions of the patient's history were reviewed and updated as appropriate: allergies, current medications, past family history, past medical history, past social history, past surgical history and problem list.     Past Medical History:   Diagnosis Date    Alcohol dependence, uncomplicated (HCC) 10/28/2022     "Coronary artery disease     Hyperlipemia     Hypertension     Irregular heart beat     bradycardia       Past Surgical History:   Procedure Laterality Date    ANTERIOR CRUCIATE LIGAMENT REPAIR Left 2001    CARDIAC LOOP RECORDER  03/2022    MD from [facility] possible pacer placement    CARDIAC SURGERY  2007    CABG x2       Family History   Problem Relation Age of Onset    Arthritis Mother     Leukemia Maternal Aunt     Mental illness Neg Hx          Medications have been verified.        Objective   /74 (BP Location: Left arm, Patient Position: Sitting, Cuff Size: Standard)   Pulse 61   Temp (!) 96.5 °F (35.8 °C) (Tympanic)   Resp 14   Ht 5' 4\" (1.626 m)   Wt 74.8 kg (165 lb)   SpO2 97%   BMI 28.32 kg/m²        Physical Exam     Physical Exam  Vitals and nursing note reviewed.   Constitutional:       General: He is not in acute distress.     Appearance: Normal appearance. He is normal weight. He is not ill-appearing, toxic-appearing or diaphoretic.   HENT:      Head: Normocephalic and atraumatic.      Right Ear: Tympanic membrane, ear canal and external ear normal. There is no impacted cerumen.      Left Ear: Tympanic membrane, ear canal and external ear normal. There is no impacted cerumen.      Nose: Nose normal. No congestion or rhinorrhea.      Mouth/Throat:      Mouth: Mucous membranes are moist.      Pharynx: Oropharynx is clear. No oropharyngeal exudate or posterior oropharyngeal erythema.   Eyes:      Extraocular Movements: Extraocular movements intact.      Conjunctiva/sclera: Conjunctivae normal.      Pupils: Pupils are equal, round, and reactive to light.   Cardiovascular:      Rate and Rhythm: Normal rate and regular rhythm.      Heart sounds: Normal heart sounds. No murmur heard.     No friction rub. No gallop.   Pulmonary:      Effort: Pulmonary effort is normal. No respiratory distress.      Breath sounds: Normal breath sounds. No stridor. No wheezing, rhonchi or rales.   Chest:      Chest " wall: No tenderness.   Abdominal:      General: Abdomen is flat. Bowel sounds are normal. There is no distension.      Palpations: Abdomen is soft. There is no mass.      Tenderness: There is no abdominal tenderness. There is no guarding or rebound.      Hernia: No hernia is present.   Musculoskeletal:         General: Normal range of motion.      Cervical back: Normal range of motion.   Lymphadenopathy:      Cervical: No cervical adenopathy.   Skin:     General: Skin is warm and dry.      Capillary Refill: Capillary refill takes less than 2 seconds.   Neurological:      Mental Status: He is alert.

## 2024-09-04 ENCOUNTER — TELEPHONE (OUTPATIENT)
Age: 69
End: 2024-09-04

## 2024-09-04 NOTE — TELEPHONE ENCOUNTER
Brigid (wife) called was seen in urgent care on 8/31/2024 and is feeling sluggish, has green phlegm, upset stomach and coughing. Would like to know if something can be sent to [facility] on [facility] in [place]. Would like a call back when completed.

## 2024-09-05 ENCOUNTER — OFFICE VISIT (OUTPATIENT)
Dept: FAMILY MEDICINE CLINIC | Facility: CLINIC | Age: 69
End: 2024-09-05
Payer: MEDICARE

## 2024-09-05 VITALS
DIASTOLIC BLOOD PRESSURE: 70 MMHG | TEMPERATURE: 98 F | BODY MASS INDEX: 27.76 KG/M2 | WEIGHT: 162.6 LBS | RESPIRATION RATE: 18 BRPM | SYSTOLIC BLOOD PRESSURE: 142 MMHG | HEART RATE: 66 BPM | HEIGHT: 64 IN

## 2024-09-05 DIAGNOSIS — J01.00 ACUTE NON-RECURRENT MAXILLARY SINUSITIS: Primary | ICD-10-CM

## 2024-09-05 PROCEDURE — 99213 OFFICE O/P EST LOW 20 MIN: CPT | Performed by: FAMILY MEDICINE

## 2024-09-05 PROCEDURE — G2211 COMPLEX E/M VISIT ADD ON: HCPCS | Performed by: FAMILY MEDICINE

## 2024-09-05 RX ORDER — AZITHROMYCIN 250 MG/1
TABLET, FILM COATED ORAL
Qty: 6 TABLET | Refills: 0 | Status: SHIPPED | OUTPATIENT
Start: 2024-09-05 | End: 2024-09-10

## 2024-09-05 NOTE — PROGRESS NOTES
Assessment/Plan:    1. Acute non-recurrent maxillary sinusitis  -     azithromycin (ZITHROMAX) 250 mg tablet; 2 tabs on day 1, 1 tab a day for 4 days after          There are no Patient Instructions on file for this visit.    No follow-ups on file.    Subjective:      Patient ID: Robin Stephens is a 68 y.o. male.    Chief Complaint   Patient presents with   • Cough     Wet productive cough - ongoing for about 1 week   WILMA Weiss pt was seen at urgent care with URI  Sniffles and cough  Tightness in chest  Pt was given cough meds    Pt states now he is coughing up phlem  He is conegsted   And his throat hurts.         The following portions of the patient's history were reviewed and updated as appropriate: allergies, current medications, past family history, past medical history, past social history, past surgical history and problem list.    Review of Systems   HENT:  Positive for congestion, sinus pressure and sore throat.    Respiratory:  Positive for cough.          Current Outpatient Medications   Medication Sig Dispense Refill   • albuterol (PROVENTIL HFA,VENTOLIN HFA) 90 mcg/act inhaler Inhale 2 puffs     • amLODIPine (NORVASC) 10 mg tablet Take 10 mg by mouth every evening       • aspirin (ECOTRIN LOW STRENGTH) 81 mg EC tablet TAKE ONE TABLET BY MOUTH DAILY FOR THE HEART.     • atorvastatin (LIPITOR) 80 mg tablet Take 1 tablet (80 mg total) by mouth in the morning 90 tablet 3   • azithromycin (ZITHROMAX) 250 mg tablet 2 tabs on day 1, 1 tab a day for 4 days after 6 tablet 0   • benzonatate (TESSALON PERLES) 100 mg capsule Take 1 capsule (100 mg total) by mouth 3 (three) times a day as needed for cough 20 capsule 0   • budesonide-formoterol (SYMBICORT) 160-4.5 mcg/act inhaler Inhale 2 puffs 2 (two) times a day     • buPROPion (WELLBUTRIN XL) 150 mg 24 hr tablet Take 150 mg by mouth       • Cholecalciferol (VITAMIN D-3 PO) TAKE ONE TABLET BY MOUTH DAILY FOR VITAMIN D SUPPLEMENTATION     •  cyclobenzaprine (FLEXERIL) 10 mg tablet Take 10 mg by mouth daily as needed       • Elastic Bandages & Supports ([employer] Lumbar/Back Support Brace) MISC Use daily 1 each 0   • escitalopram (LEXAPRO) 20 mg tablet Take 20 mg by mouth       • fluticasone (FLONASE) 50 mcg/act nasal spray as needed     • folic acid (FOLVITE) 1 mg tablet 1 mg     • furosemide (LASIX) 20 mg tablet if needed     • hydrochlorothiazide (HYDRODIURIL) 25 mg tablet Take 12.5 mg by mouth daily     • ibuprofen (MOTRIN) 600 mg tablet Take 600 mg by mouth every 6 (six) hours as needed for moderate pain     • lidocaine (LIDODERM) 5 % APPLY 1 PATCH ON SKIN DAILY FOR PAIN. DO NOT WEAR FOR LONGER THAN 12 HOURS IN ANY 24 HOUR PERIOD. WASH YOUR HANDS AFTER APPLYING A PATCH.     • losartan (COZAAR) 100 MG tablet Take 100 mg by mouth daily     • melatonin 3 mg Take 3 mg by mouth     • metoprolol succinate (TOPROL-XL) 25 mg 24 hr tablet Take 1 tablet (25 mg total) by mouth every morning 90 tablet 1   • metoprolol succinate (TOPROL-XL) 50 mg 24 hr tablet Take 1 tablet (50 mg total) by mouth every morning 90 tablet 1   • montelukast (SINGULAIR) 10 mg tablet Take 10 mg by mouth     • Multiple Vitamin (MULTIVITAMIN PO) TAKE 1 CAP/TAB BY MOUTH DAILY FOR VITAMIN SUPPLEMENT     • omeprazole (PriLOSEC) 20 mg delayed release capsule take 1 capsule by mouth once daily 60 capsule 1   • polyvinyl alcohol (LIQUIFILM TEARS) 1.4 % ophthalmic solution Administer 1 drop to both eyes 4 (four) times a day     • prazosin (MINIPRESS) 5 mg capsule Take 5 mg by mouth daily at bedtime     • sodium chloride (OCEAN) 0.65 % nasal spray 1 spray into each nostril 4 (four) times a day as needed     • Spacer/Aero-Holding Chambers ([employer] Spacer Adult) MISC Use 1 each     • Spacer/Aero-Holding Chambers (OptiChamber Elicia) MISC      • meloxicam (MOBIC) 15 mg tablet Take 1 tablet (15 mg total) by mouth daily As needed (Patient not taking: Reported on 9/5/2024) 30 tablet 0     No  "current facility-administered medications for this visit.       Objective:    /70   Pulse 66   Temp 98 °F (36.7 °C)   Resp 18   Ht 5' 4\" (1.626 m)   Wt 73.8 kg (162 lb 9.6 oz)   BMI 27.91 kg/m²        Physical Exam  HENT:      Nose: Congestion and rhinorrhea present.                Frank Lombardi, DO  "

## 2024-10-07 ENCOUNTER — TELEPHONE (OUTPATIENT)
Age: 69
End: 2024-10-07

## 2024-10-07 NOTE — TELEPHONE ENCOUNTER
Pt's wife call. She wants to know if pt is supposed to get PSA now or wait until Jan. Please, advise.

## 2024-10-07 NOTE — TELEPHONE ENCOUNTER
Test is dated 6 months from the previous - I believe he had a greater then a 0.6 rise in 12 monmths

## 2024-11-19 NOTE — PROGRESS NOTES
Progress Note - Cardiology Office  [facility]    Robin Stephens 69 y.o. male MRN: [id number]  : 1955  Encounter: [id number]      Assessment:     Assessment & Plan  Cardiac sarcoidosis  PET CT FDG myocardial sarcoid and cardiac MRI consistent with cardiac sarcoid.  22 TTE: LVEF 60%.  Left ventricle cavity size is normal.  Left ventricle wall thickness is mildly increased measuring 1.1 cm at posterior wall and 1.3 cm interventricular septum.  Mild concentric hypertrophy. Diastolic function is mildly abnormal, consistent with grade I (abnormal) relaxation.  Right ventricle systolic function is normal with pacer wire present.  Left atrium moderately dilated.  Mitral with trace vegetation.  Tricuspid with mild regurgitation.  Follows outpatient with [facility] cardiology.  ICD (implantable cardioverter-defibrillator) in place  Patient with history of paroxysmal 2:1 AV block s/p DC-ICD (Abbott) on 22 with RV lead revision 22.  Device interrogations followed by [facility] cardiology.  Device interrogation 24: Battery good 6.7 years, DDDR  bpm, Since 2024 Ap 37%,  39%, 11 episodes recorded as VT since 2024 (6 SVT episodes, 5 non sustained Episodes), multiple episodes recorded as SVT at times of exercise, recorded at 155 -157 bpm, can most likely be Sinus tachycardia, reviewed with Dr. Redmond, VT monitor zone increased from 150 to 155 bpm.  Atrial tachycardia (HCC)  s/p ablation (24).   Currently on Toprol-XL 75 mg daily.  Patient reports in the past week he has had episodes of palpitations that are associated with lightheadedness and sometimes has discomfort.  He states that he has had significant stress in his personal life.  Reports that the palpitations can last several hours.  He states that he monitors his heart rate and has noted that his heart rate can go up to the 120s during episodes of palpitations.   Discussed with patient  recommendations to have his DC-ICD interrogated.  He performs remote interrogations via his phone and information is sent to [facility] cardiology.  Unable to interrogate Abbott device in office today due to machine malfunction.  Asked that the patient mostly send device interrogation to [facility] and asked that they please fax our office the report for our review.  Coronary artery disease involving native coronary artery of native heart without angina pectoris  Stable.  Patient denies experiencing chest pain.  s/p CABGx2 (2007).  Currently on aspirin 81 mg daily.   Currently on atorvastatin 80 mg daily.  Currently on Toprol-XL 75 mg daily.  Hypertension, unspecified type  BP during today's office visit, 130/80.   Currently on amlodipine 10 mg daily, Lasix 20 mg as needed, hydrochlorothiazide 12.5 mg daily, losartan 100 mg daily, Toprol-XL 75 mg daily.  Mixed hyperlipidemia  7/30/24 lipoprotein NMR: Cholesterol 164, LDL 97, LDL-P 1208, LDL size 20.5, small LDL-P 608, HDL 45, HDL-P 30.4.   Currently on atorvastatin 80 mg daily.  RBBB  Chronic.   TIA (transient ischemic attack)  Documented history of TIA in 2012.  Currently on aspirin 81 mg daily and atorvastatin 80 mg daily.  Pre-diabetes  7/30/24 HgbA1c: 6.4.   Care per PCP.     Discussion Summary and Plan:      Patient / Caretaker was advised and educated to call our office  immediately if  patient has any new symptoms of chest pain/shortness of breath, near-syncope, syncope, light headedness sustained palpitations  or any other cardiovascular symptoms before their scheduled follow-up appointment.  Office number was provided # [phone]  Please call [phone] if any questions.  Counseling :  A description of the counseling.  Goals and Barriers.  Patient's ability to self care: Yes  Medication side effect reviewed with patient in detail and all their questions answered to their satisfaction.    HPI :     Robin Stephens is a 69 y.o. male with PMHx of cardiac sarcoidosis,  AT s/p ablation (1/30/24), paroxysmal 2:1 AV block s/p DC-ICD (Abbott) on 6/23/22 with RV lead revision 8/12/22, CAD s/p CABGx2 (2007), HTN, HLD, chronic RBBB, TIA (2012), pre-diabetes, who presents for routine outpatient cardiology follow-up.      Patient was last seen outpatient cardiology office on 7/30/24.  Patient reports in the past week he has had episodes of palpitations that are associated with lightheadedness and sometimes has discomfort.  He states that he has had significant stress in his personal life.  Reports that the palpitations can last several hours.  He states that he monitors his heart rate and has noted that his heart rate can go up to the 120s during episodes of palpitations.       Discussed with patient recommendations to have his DC-ICD interrogated.  He performs remote interrogations via his phone and information is sent to [facility] cardiology.  Unable to interrogate Abbott device in office today due to machine malfunction.  Asked that the patient mostly send device interrogation to [facility] and asked that they please fax our office the report for our review.      Patient reports he is considering transferring his care from [facility] EP cardiology to [facility].  He asked for referral to EP cardiology as he would like to establish care with EP cardiology at [facility].    Review of Systems   Constitutional:  Negative for activity change, appetite change, chills, diaphoresis, fatigue, fever and unexpected weight change.   Respiratory:  Positive for chest tightness. Negative for cough, shortness of breath and wheezing.    Cardiovascular:  Positive for palpitations. Negative for chest pain and leg swelling.   Gastrointestinal:  Negative for abdominal distention, abdominal pain, constipation, diarrhea, nausea and vomiting.   Skin: Negative.    Neurological:  Positive for light-headedness. Negative for dizziness, syncope, weakness, numbness and headaches.       Historical Information   Past Medical History:    Diagnosis Date    Alcohol dependence, uncomplicated (HCC) 10/28/2022    Coronary artery disease     Hyperlipemia     Hypertension     Irregular heart beat     bradycardia     Past Surgical History:   Procedure Laterality Date    ANTERIOR CRUCIATE LIGAMENT REPAIR Left 2001    CARDIAC LOOP RECORDER  03/2022    MD from [facility] possible pacer placement    CARDIAC SURGERY  2007    CABG x2     Social History     Substance and Sexual Activity   Alcohol Use Never     Social History     Substance and Sexual Activity   Drug Use Never     Social History     Tobacco Use   Smoking Status Former    Current packs/day: 0.25    Average packs/day: 0.3 packs/day for 55.9 years (14.0 ttl pk-yrs)    Types: Cigarettes    Start date: 1969    Passive exposure: Past   Smokeless Tobacco Never     Family History:   Family History   Problem Relation Age of Onset    Arthritis Mother     Leukemia Maternal Aunt     Mental illness Neg Hx        Meds/Allergies     Allergies   Allergen Reactions    Bee Pollen Other (See Comments)     Itchy eyes/nose, sneezing, clogged ears, increased cough/chest tightness.    Methotrexate Other (See Comments)     Felt unwell, unsteady    Moxifloxacin GI Intolerance     W/ GI bleed      Pollen Extract Other (See Comments)     Itchy eyes/nose, sneezing, clogged ears, increased cough/chest tightness.    Lisinopril Cough       Current Outpatient Medications:     albuterol (PROVENTIL HFA,VENTOLIN HFA) 90 mcg/act inhaler, Inhale 2 puffs, Disp: , Rfl:     amLODIPine (NORVASC) 10 mg tablet, Take 10 mg by mouth every evening  , Disp: , Rfl:     aspirin (ECOTRIN LOW STRENGTH) 81 mg EC tablet, TAKE ONE TABLET BY MOUTH DAILY FOR THE HEART., Disp: , Rfl:     atorvastatin (LIPITOR) 80 mg tablet, Take 1 tablet (80 mg total) by mouth in the morning, Disp: 90 tablet, Rfl: 3    buPROPion (WELLBUTRIN XL) 150 mg 24 hr tablet, Take 150 mg by mouth  , Disp: , Rfl:     Cholecalciferol 50 MCG (2000 UT) TABS, Take 50 mcg by mouth daily, Disp:  , Rfl:     cyclobenzaprine (FLEXERIL) 10 mg tablet, Take 10 mg by mouth daily as needed  , Disp: , Rfl:     Elastic Bandages & Supports ([employer] Lumbar/Back Support Brace) MISC, Use daily, Disp: 1 each, Rfl: 0    escitalopram (LEXAPRO) 20 mg tablet, Take 20 mg by mouth  , Disp: , Rfl:     fluticasone (FLONASE) 50 mcg/act nasal spray, as needed, Disp: , Rfl:     folic acid (FOLVITE) 1 mg tablet, 1 mg, Disp: , Rfl:     furosemide (LASIX) 20 mg tablet, if needed, Disp: , Rfl:     hydrochlorothiazide (HYDRODIURIL) 25 mg tablet, Take 12.5 mg by mouth daily, Disp: , Rfl:     ibuprofen (MOTRIN) 600 mg tablet, Take 600 mg by mouth every 6 (six) hours as needed for moderate pain, Disp: , Rfl:     lidocaine (LIDODERM) 5 %, APPLY 1 PATCH ON SKIN DAILY FOR PAIN. DO NOT WEAR FOR LONGER THAN 12 HOURS IN ANY 24 HOUR PERIOD. WASH YOUR HANDS AFTER APPLYING A PATCH., Disp: , Rfl:     losartan (COZAAR) 100 MG tablet, Take 100 mg by mouth daily, Disp: , Rfl:     melatonin 3 mg, Take 3 mg by mouth, Disp: , Rfl:     metoprolol succinate (TOPROL-XL) 25 mg 24 hr tablet, Take 1 tablet (25 mg total) by mouth every morning, Disp: 90 tablet, Rfl: 1    metoprolol succinate (TOPROL-XL) 50 mg 24 hr tablet, Take 1 tablet (50 mg total) by mouth every morning, Disp: 90 tablet, Rfl: 1    montelukast (SINGULAIR) 10 mg tablet, Take 10 mg by mouth, Disp: , Rfl:     Multiple Vitamin (MULTIVITAMIN PO), TAKE 1 CAP/TAB BY MOUTH DAILY FOR VITAMIN SUPPLEMENT, Disp: , Rfl:     omeprazole (PriLOSEC) 20 mg delayed release capsule, take 1 capsule by mouth once daily, Disp: 60 capsule, Rfl: 1    polyvinyl alcohol (LIQUIFILM TEARS) 1.4 % ophthalmic solution, Administer 1 drop to both eyes 4 (four) times a day, Disp: , Rfl:     sodium chloride (OCEAN) 0.65 % nasal spray, 1 spray into each nostril 4 (four) times a day as needed, Disp: , Rfl:     budesonide-formoterol (SYMBICORT) 160-4.5 mcg/act inhaler, Inhale 2 puffs 2 (two) times a day, Disp: , Rfl:     meloxicam  "(MOBIC) 15 mg tablet, Take 1 tablet (15 mg total) by mouth daily As needed (Patient not taking: Reported on 2024), Disp: 30 tablet, Rfl: 0    Vitals: Blood pressure 130/80, pulse 68, height 5' 4\" (1.626 m), weight 72.6 kg (160 lb), SpO2 97%.    Body mass index is 27.46 kg/m².  Wt Readings from Last 3 Encounters:   24 72.6 kg (160 lb)   24 73.8 kg (162 lb 9.6 oz)   24 74.8 kg (165 lb)     Vitals:    24 0801   Weight: 72.6 kg (160 lb)     BP Readings from Last 3 Encounters:   24 130/80   24 142/70   24 136/74       Physical Exam:  Physical Exam  Vitals reviewed.   Constitutional:       General: He is not in acute distress.  Cardiovascular:      Rate and Rhythm: Normal rate and regular rhythm.      Pulses: Normal pulses.      Heart sounds: No murmur heard.  Pulmonary:      Effort: Pulmonary effort is normal. No respiratory distress.      Breath sounds: Normal breath sounds.   Abdominal:      General: Abdomen is flat. There is no distension.      Palpations: Abdomen is soft.      Tenderness: There is no abdominal tenderness.   Musculoskeletal:      Right lower leg: No edema.      Left lower leg: No edema.   Skin:     General: Skin is warm and dry.   Neurological:      Mental Status: He is alert and oriented to person, place, and time.     Diagnostic Studies Review Cardio:      EK/21/24 EKG: Sinus rhythm, 68 bpm.  RBBB.  T wave abnormality in lateral leads.      Cardiac testing:   No results found for this or any previous visit.      Lab Review   Lab Results   Component Value Date    WBC 9.74 2024    HGB 13.4 2024    HCT 43.3 2024    MCV 81 (L) 2024    RDW 15.0 2024     2024     BMP:  Lab Results   Component Value Date    SODIUM 142 2024    K 4.2 2024     2024    CO2 29 2024    BUN 19 2024    CREATININE 0.94 2024    GLUC 106 2022    GLUF 93 2024    CALCIUM 9.3 2024 "    CORRECTEDCA 9.9 10/17/2022    EGFR 82 07/30/2024     Troponins:    LFT:  Lab Results   Component Value Date    AST 16 07/30/2024    ALT 18 07/30/2024    ALKPHOS 90 07/30/2024    TP 7.6 07/30/2024    ALB 4.1 07/30/2024      Lab Results   Component Value Date    HGBA1C 6.4 (H) 07/30/2024     Lipid Profile:   Lab Results   Component Value Date    CHOLESTEROL 154 07/30/2024    CHOLESTEROL 164 07/30/2024    HDL 42 07/30/2024    HDL 45 07/30/2024    LDLCALC 87 07/30/2024    LDLCALC 97 07/30/2024    TRIG 124 07/30/2024       Stephani Brunson PA-C

## 2024-11-20 NOTE — ASSESSMENT & PLAN NOTE
Documented history of TIA in 2012.  Currently on aspirin 81 mg daily and atorvastatin 80 mg daily.

## 2024-11-20 NOTE — ASSESSMENT & PLAN NOTE
BP during today's office visit, 130/80.   Currently on amlodipine 10 mg daily, Lasix 20 mg as needed, hydrochlorothiazide 12.5 mg daily, losartan 100 mg daily, Toprol-XL 75 mg daily.

## 2024-11-20 NOTE — ASSESSMENT & PLAN NOTE
s/p ablation (1/30/24).   Currently on Toprol-XL 75 mg daily.  Patient reports in the past week he has had episodes of palpitations that are associated with lightheadedness and sometimes has discomfort.  He states that he has had significant stress in his personal life.  Reports that the palpitations can last several hours.  He states that he monitors his heart rate and has noted that his heart rate can go up to the 120s during episodes of palpitations.   Discussed with patient recommendations to have his DC-ICD interrogated.  He performs remote interrogations via his phone and information is sent to [facility] cardiology.  Unable to interrogate Abbott device in office today due to machine malfunction.  Asked that the patient mostly send device interrogation to [facility] and asked that they please fax our office the report for our review.

## 2024-11-20 NOTE — ASSESSMENT & PLAN NOTE
7/30/24 lipoprotein NMR: Cholesterol 164, LDL 97, LDL-P 1208, LDL size 20.5, small LDL-P 608, HDL 45, HDL-P 30.4.   Currently on atorvastatin 80 mg daily.

## 2024-11-20 NOTE — ASSESSMENT & PLAN NOTE
PET CT FDG myocardial sarcoid and cardiac MRI consistent with cardiac sarcoid.  12/29/22 TTE: LVEF 60%.  Left ventricle cavity size is normal.  Left ventricle wall thickness is mildly increased measuring 1.1 cm at posterior wall and 1.3 cm interventricular septum.  Mild concentric hypertrophy. Diastolic function is mildly abnormal, consistent with grade I (abnormal) relaxation.  Right ventricle systolic function is normal with pacer wire present.  Left atrium moderately dilated.  Mitral with trace vegetation.  Tricuspid with mild regurgitation.  Follows outpatient with [facility] cardiology.

## 2024-11-20 NOTE — ASSESSMENT & PLAN NOTE
Stable.  Patient denies experiencing chest pain.  s/p CABGx2 (2007).  Currently on aspirin 81 mg daily.   Currently on atorvastatin 80 mg daily.  Currently on Toprol-XL 75 mg daily.

## 2024-11-21 ENCOUNTER — OFFICE VISIT (OUTPATIENT)
Dept: CARDIOLOGY CLINIC | Facility: CLINIC | Age: 69
End: 2024-11-21
Payer: MEDICARE

## 2024-11-21 VITALS
BODY MASS INDEX: 27.31 KG/M2 | SYSTOLIC BLOOD PRESSURE: 130 MMHG | WEIGHT: 160 LBS | HEIGHT: 64 IN | HEART RATE: 68 BPM | DIASTOLIC BLOOD PRESSURE: 80 MMHG | OXYGEN SATURATION: 97 %

## 2024-11-21 DIAGNOSIS — E78.2 MIXED HYPERLIPIDEMIA: ICD-10-CM

## 2024-11-21 DIAGNOSIS — I25.10 CORONARY ARTERY DISEASE INVOLVING NATIVE CORONARY ARTERY OF NATIVE HEART WITHOUT ANGINA PECTORIS: ICD-10-CM

## 2024-11-21 DIAGNOSIS — I10 HYPERTENSION, UNSPECIFIED TYPE: ICD-10-CM

## 2024-11-21 DIAGNOSIS — I42.9 CARDIOMYOPATHY, UNSPECIFIED TYPE (HCC): ICD-10-CM

## 2024-11-21 DIAGNOSIS — G45.9 TIA (TRANSIENT ISCHEMIC ATTACK): ICD-10-CM

## 2024-11-21 DIAGNOSIS — I47.19 ATRIAL TACHYCARDIA (HCC): ICD-10-CM

## 2024-11-21 DIAGNOSIS — Z95.810 ICD (IMPLANTABLE CARDIOVERTER-DEFIBRILLATOR) IN PLACE: ICD-10-CM

## 2024-11-21 DIAGNOSIS — D86.85 CARDIAC SARCOIDOSIS: ICD-10-CM

## 2024-11-21 DIAGNOSIS — I25.10 CORONARY ARTERY DISEASE, UNSPECIFIED VESSEL OR LESION TYPE, UNSPECIFIED WHETHER ANGINA PRESENT, UNSPECIFIED WHETHER NATIVE OR TRANSPLANTED HEART: ICD-10-CM

## 2024-11-21 DIAGNOSIS — R73.03 PRE-DIABETES: ICD-10-CM

## 2024-11-21 DIAGNOSIS — I25.119 ATHEROSCLEROSIS OF NATIVE CORONARY ARTERY OF NATIVE HEART WITH ANGINA PECTORIS (HCC): Primary | ICD-10-CM

## 2024-11-21 DIAGNOSIS — I45.10 RBBB: ICD-10-CM

## 2024-11-21 PROCEDURE — 93000 ELECTROCARDIOGRAM COMPLETE: CPT | Performed by: PHYSICIAN ASSISTANT

## 2024-11-21 PROCEDURE — 99214 OFFICE O/P EST MOD 30 MIN: CPT | Performed by: PHYSICIAN ASSISTANT

## 2025-02-13 ENCOUNTER — RA CDI HCC (OUTPATIENT)
Dept: OTHER | Facility: HOSPITAL | Age: 70
End: 2025-02-13

## 2025-02-13 NOTE — PROGRESS NOTES
HCC coding opportunities          Chart Reviewed number of suggestions sent to Provider: 1     Patients Insurance   I42.9  Medicare Insurance: Medicare

## 2025-03-12 DIAGNOSIS — R21 RASH: Primary | ICD-10-CM

## 2025-03-13 ENCOUNTER — TELEPHONE (OUTPATIENT)
Age: 70
End: 2025-03-13

## 2025-03-13 ENCOUNTER — APPOINTMENT (OUTPATIENT)
Dept: LAB | Facility: CLINIC | Age: 70
End: 2025-03-13
Payer: MEDICARE

## 2025-03-13 DIAGNOSIS — R97.20 INCREASED PROSTATE SPECIFIC ANTIGEN (PSA) VELOCITY: ICD-10-CM

## 2025-03-13 DIAGNOSIS — R73.03 PREDIABETES: ICD-10-CM

## 2025-03-13 DIAGNOSIS — Z12.5 SCREENING FOR PROSTATE CANCER: ICD-10-CM

## 2025-03-13 DIAGNOSIS — Z13.6 SCREENING FOR CARDIOVASCULAR CONDITION: ICD-10-CM

## 2025-03-13 DIAGNOSIS — H93.13 TINNITUS, BILATERAL: ICD-10-CM

## 2025-03-13 DIAGNOSIS — E78.2 MIXED HYPERLIPIDEMIA: ICD-10-CM

## 2025-03-13 DIAGNOSIS — I10 HYPERTENSION, UNSPECIFIED TYPE: ICD-10-CM

## 2025-03-13 LAB
ALBUMIN SERPL BCG-MCNC: 4 G/DL (ref 3.5–5)
ALP SERPL-CCNC: 94 U/L (ref 34–104)
ALT SERPL W P-5'-P-CCNC: 20 U/L (ref 7–52)
ANION GAP SERPL CALCULATED.3IONS-SCNC: 10 MMOL/L (ref 4–13)
AST SERPL W P-5'-P-CCNC: 17 U/L (ref 13–39)
BILIRUB SERPL-MCNC: 0.55 MG/DL (ref 0.2–1)
BUN SERPL-MCNC: 23 MG/DL (ref 5–25)
CALCIUM SERPL-MCNC: 9.3 MG/DL (ref 8.4–10.2)
CHLORIDE SERPL-SCNC: 106 MMOL/L (ref 96–108)
CHOLEST SERPL-MCNC: 194 MG/DL (ref ?–200)
CO2 SERPL-SCNC: 24 MMOL/L (ref 21–32)
CREAT SERPL-MCNC: 0.92 MG/DL (ref 0.6–1.3)
ERYTHROCYTE [DISTWIDTH] IN BLOOD BY AUTOMATED COUNT: 14.9 % (ref 11.6–15.1)
EST. AVERAGE GLUCOSE BLD GHB EST-MCNC: 140 MG/DL
GFR SERPL CREATININE-BSD FRML MDRD: 84 ML/MIN/1.73SQ M
GLUCOSE P FAST SERPL-MCNC: 109 MG/DL (ref 65–99)
HBA1C MFR BLD: 6.5 %
HCT VFR BLD AUTO: 42.6 % (ref 36.5–49.3)
HDLC SERPL-MCNC: 44 MG/DL
HGB BLD-MCNC: 13.1 G/DL (ref 12–17)
LDLC SERPL CALC-MCNC: 128 MG/DL (ref 0–100)
MCH RBC QN AUTO: 25.2 PG (ref 26.8–34.3)
MCHC RBC AUTO-ENTMCNC: 30.8 G/DL (ref 31.4–37.4)
MCV RBC AUTO: 82 FL (ref 82–98)
PLATELET # BLD AUTO: 346 THOUSANDS/UL (ref 149–390)
PMV BLD AUTO: 10.5 FL (ref 8.9–12.7)
POTASSIUM SERPL-SCNC: 3.9 MMOL/L (ref 3.5–5.3)
PROT SERPL-MCNC: 7.3 G/DL (ref 6.4–8.4)
PSA SERPL-MCNC: 2.37 NG/ML (ref 0–4)
RBC # BLD AUTO: 5.2 MILLION/UL (ref 3.88–5.62)
SODIUM SERPL-SCNC: 140 MMOL/L (ref 135–147)
TRIGL SERPL-MCNC: 108 MG/DL (ref ?–150)
WBC # BLD AUTO: 5.58 THOUSAND/UL (ref 4.31–10.16)

## 2025-03-13 PROCEDURE — 83036 HEMOGLOBIN GLYCOSYLATED A1C: CPT

## 2025-03-13 PROCEDURE — G0103 PSA SCREENING: HCPCS

## 2025-03-13 PROCEDURE — 84153 ASSAY OF PSA TOTAL: CPT

## 2025-03-13 PROCEDURE — 80053 COMPREHEN METABOLIC PANEL: CPT

## 2025-03-13 PROCEDURE — 85027 COMPLETE CBC AUTOMATED: CPT

## 2025-03-13 PROCEDURE — 83704 LIPOPROTEIN BLD QUAN PART: CPT

## 2025-03-13 PROCEDURE — 36415 COLL VENOUS BLD VENIPUNCTURE: CPT

## 2025-03-13 PROCEDURE — 80061 LIPID PANEL: CPT

## 2025-03-13 NOTE — TELEPHONE ENCOUNTER
Patients wife called in with questions about blood work.    Patient had a Cardiac CT with contrast yesterday.  She is asking if it is ok, to get routine blood work done tomorrow.    Advised that the patient can get blood work done.  Should not be affected by CT contrast.      NILSON

## 2025-03-14 ENCOUNTER — RESULTS FOLLOW-UP (OUTPATIENT)
Dept: FAMILY MEDICINE CLINIC | Facility: CLINIC | Age: 70
End: 2025-03-14

## 2025-03-17 ENCOUNTER — RESULTS FOLLOW-UP (OUTPATIENT)
Dept: CARDIOLOGY CLINIC | Facility: CLINIC | Age: 70
End: 2025-03-17

## 2025-03-17 LAB
CHOLEST SERPL-MCNC: 200 MG/DL (ref 100–199)
HDL SERPL-SCNC: 24.4 UMOL/L
HDLC SERPL-MCNC: 44 MG/DL
LDL SERPL QN: 20.6 NM
LDL SERPL QN: 813 NMOL/L
LDL SERPL-SCNC: 1665 NMOL/L
LDLC SERPL CALC-MCNC: 136 MG/DL (ref 0–99)
LP-IR SCORE SERPL: 47
TRIGL SERPL-MCNC: 109 MG/DL (ref 0–149)

## 2025-03-18 ENCOUNTER — OFFICE VISIT (OUTPATIENT)
Dept: FAMILY MEDICINE CLINIC | Facility: CLINIC | Age: 70
End: 2025-03-18
Payer: MEDICARE

## 2025-03-18 VITALS
HEIGHT: 68 IN | SYSTOLIC BLOOD PRESSURE: 110 MMHG | RESPIRATION RATE: 16 BRPM | HEART RATE: 72 BPM | TEMPERATURE: 96.6 F | BODY MASS INDEX: 24.67 KG/M2 | WEIGHT: 162.8 LBS | DIASTOLIC BLOOD PRESSURE: 72 MMHG

## 2025-03-18 DIAGNOSIS — D86.85 CARDIAC SARCOIDOSIS: ICD-10-CM

## 2025-03-18 DIAGNOSIS — I42.9 CARDIOMYOPATHY, UNSPECIFIED TYPE (HCC): ICD-10-CM

## 2025-03-18 DIAGNOSIS — F33.1 MAJOR DEPRESSIVE DISORDER, RECURRENT, MODERATE (HCC): ICD-10-CM

## 2025-03-18 DIAGNOSIS — R73.03 PREDIABETES: ICD-10-CM

## 2025-03-18 DIAGNOSIS — B35.9 DERMATOPHYTOSIS: ICD-10-CM

## 2025-03-18 DIAGNOSIS — F12.20 CANNABIS DEPENDENCE (HCC): ICD-10-CM

## 2025-03-18 DIAGNOSIS — I10 HYPERTENSION, UNSPECIFIED TYPE: ICD-10-CM

## 2025-03-18 DIAGNOSIS — F02.80 DEMENTIA IN OTHER DISEASES CLASSIFIED ELSEWHERE, UNSPECIFIED SEVERITY, WITHOUT BEHAVIORAL DISTURBANCE, PSYCHOTIC DISTURBANCE, MOOD DISTURBANCE, AND ANXIETY (HCC): ICD-10-CM

## 2025-03-18 DIAGNOSIS — Z00.00 MEDICARE ANNUAL WELLNESS VISIT, SUBSEQUENT: Primary | ICD-10-CM

## 2025-03-18 DIAGNOSIS — L30.9 DERMATITIS: ICD-10-CM

## 2025-03-18 DIAGNOSIS — E78.2 MIXED HYPERLIPIDEMIA: ICD-10-CM

## 2025-03-18 PROCEDURE — G0439 PPPS, SUBSEQ VISIT: HCPCS | Performed by: FAMILY MEDICINE

## 2025-03-18 RX ORDER — CLOTRIMAZOLE AND BETAMETHASONE DIPROPIONATE 10; .64 MG/G; MG/G
CREAM TOPICAL 2 TIMES DAILY
Qty: 45 G | Refills: 0 | Status: SHIPPED | OUTPATIENT
Start: 2025-03-18

## 2025-03-18 RX ORDER — MOMETASONE FUROATE 1 MG/G
CREAM TOPICAL DAILY
Qty: 45 G | Refills: 0 | Status: SHIPPED | OUTPATIENT
Start: 2025-03-18

## 2025-03-18 RX ORDER — EZETIMIBE 10 MG/1
10 TABLET ORAL DAILY
Qty: 90 TABLET | Refills: 3 | Status: SHIPPED | OUTPATIENT
Start: 2025-03-18 | End: 2026-03-18

## 2025-03-18 NOTE — PATIENT INSTRUCTIONS
Medicare Preventive Visit Patient Instructions  Thank you for completing your Welcome to Medicare Visit or Medicare Annual Wellness Visit today. Your next wellness visit will be due in one year (3/19/2026).  The screening/preventive services that you may require over the next 5-10 years are detailed below. Some tests may not apply to you based off risk factors and/or age. Screening tests ordered at today's visit but not completed yet may show as past due. Also, please note that scanned in results may not display below.  Preventive Screenings:  Service Recommendations Previous Testing/Comments   Colorectal Cancer Screening  Colonoscopy    Fecal Occult Blood Test (FOBT)/Fecal Immunochemical Test (FIT)  Fecal DNA/Cologuard Test  Flexible Sigmoidoscopy Age: 45-75 years old   Colonoscopy: every 10 years (May be performed more frequently if at higher risk)  OR  FOBT/FIT: every 1 year  OR  Cologuard: every 3 years  OR  Sigmoidoscopy: every 5 years  Screening may be recommended earlier than age 45 if at higher risk for colorectal cancer. Also, an individualized decision between you and your healthcare provider will decide whether screening between the ages of 76-85 would be appropriate. Colonoscopy: 04/13/2022  FOBT/FIT: Not on file  Cologuard: Not on file  Sigmoidoscopy: Not on file    Screening Current     Prostate Cancer Screening Individualized decision between patient and health care provider in men between ages of 55-69   Medicare will cover every 12 months beginning on the day after your 50th birthday PSA: 2.365 ng/mL     Screening Current     Hepatitis C Screening Once for adults born between 1945 and 1965  More frequently in patients at high risk for Hepatitis C Hep C Antibody: 10/31/2022    Screening Current   Diabetes Screening 1-2 times per year if you're at risk for diabetes or have pre-diabetes Fasting glucose: 109 mg/dL (3/13/2025)  A1C: 6.5 % (3/13/2025)  Screening Current   Cholesterol Screening Once every  5 years if you don't have a lipid disorder. May order more often based on risk factors. Lipid panel: 03/13/2025  Screening Not Indicated  History Lipid Disorder      Other Preventive Screenings Covered by Medicare:  Abdominal Aortic Aneurysm (AAA) Screening: covered once if your at risk. You're considered to be at risk if you have a family history of AAA or a male between the age of 65-75 who smoking at least 100 cigarettes in your lifetime.  Lung Cancer Screening: covers low dose CT scan once per year if you meet all of the following conditions: (1) Age 55-77; (2) No signs or symptoms of lung cancer; (3) Current smoker or have quit smoking within the last 15 years; (4) You have a tobacco smoking history of at least 20 pack years (packs per day x number of years you smoked); (5) You get a written order from a healthcare provider.  Glaucoma Screening: covered annually if you're considered high risk: (1) You have diabetes OR (2) Family history of glaucoma OR (3)  aged 50 and older OR (4)  American aged 65 and older  Osteoporosis Screening: covered every 2 years if you meet one of the following conditions: (1) Have a vertebral abnormality; (2) On glucocorticoid therapy for more than 3 months; (3) Have primary hyperparathyroidism; (4) On osteoporosis medications and need to assess response to drug therapy.  HIV Screening: covered annually if you're between the age of 15-65. Also covered annually if you are younger than 15 and older than 65 with risk factors for HIV infection. For pregnant patients, it is covered up to 3 times per pregnancy.    Immunizations:  Immunization Recommendations   Influenza Vaccine Annual influenza vaccination during flu season is recommended for all persons aged >= 6 months who do not have contraindications   Pneumococcal Vaccine   * Pneumococcal conjugate vaccine = PCV13 (Prevnar 13), PCV15 (Vaxneuvance), PCV20 (Prevnar 20)  * Pneumococcal polysaccharide vaccine =  PPSV23 (Pneumovax) Adults 19-63 yo with certain risk factors or if 65+ yo  If never received any pneumonia vaccine: recommend Prevnar 20 (PCV20)  Give PCV20 if previously received 1 dose of PCV13 or PPSV23   Hepatitis B Vaccine 3 dose series if at intermediate or high risk (ex: diabetes, end stage renal disease, liver disease)   Respiratory syncytial virus (RSV) Vaccine - COVERED BY MEDICARE PART D  * RSVPreF3 (Arexvy) CDC recommends that adults 60 years of age and older may receive a single dose of RSV vaccine using shared clinical decision-making (SCDM)   Tetanus (Td) Vaccine - COST NOT COVERED BY MEDICARE PART B Following completion of primary series, a booster dose should be given every 10 years to maintain immunity against tetanus. Td may also be given as tetanus wound prophylaxis.   Tdap Vaccine - COST NOT COVERED BY MEDICARE PART B Recommended at least once for all adults. For pregnant patients, recommended with each pregnancy.   Shingles Vaccine (Shingrix) - COST NOT COVERED BY MEDICARE PART B  2 shot series recommended in those 19 years and older who have or will have weakened immune systems or those 50 years and older     Health Maintenance Due:      Topic Date Due   • Colorectal Cancer Screening  04/11/2029   • Hepatitis C Screening  Completed     Immunizations Due:      Topic Date Due   • IPV Vaccine (2 of 3 - Adult catch-up series) 05/11/2003   • COVID-19 Vaccine (6 - 2024-25 season) 09/01/2024     Advance Directives   What are advance directives?  Advance directives are legal documents that state your wishes and plans for medical care. These plans are made ahead of time in case you lose your ability to make decisions for yourself. Advance directives can apply to any medical decision, such as the treatments you want, and if you want to donate organs.   What are the types of advance directives?  There are many types of advance directives, and each state has rules about how to use them. You may choose a  combination of any of the following:  Living will:  This is a written record of the treatment you want. You can also choose which treatments you do not want, which to limit, and which to stop at a certain time. This includes surgery, medicine, IV fluid, and tube feedings.   Durable power of  for healthcare (DPAHC):  This is a written record that states who you want to make healthcare choices for you when you are unable to make them for yourself. This person, called a proxy, is usually a family member or a friend. You may choose more than 1 proxy.  Do not resuscitate (DNR) order:  A DNR order is used in case your heart stops beating or you stop breathing. It is a request not to have certain forms of treatment, such as CPR. A DNR order may be included in other types of advance directives.  Medical directive:  This covers the care that you want if you are in a coma, near death, or unable to make decisions for yourself. You can list the treatments you want for each condition. Treatment may include pain medicine, surgery, blood transfusions, dialysis, IV or tube feedings, and a ventilator (breathing machine).  Values history:  This document has questions about your views, beliefs, and how you feel and think about life. This information can help others choose the care that you would choose.  Why are advance directives important?  An advance directive helps you control your care. Although spoken wishes may be used, it is better to have your wishes written down. Spoken wishes can be misunderstood, or not followed. Treatments may be given even if you do not want them. An advance directive may make it easier for your family to make difficult choices about your care.       © Copyright [employer] 2018 Information is for End User's use only and may not be sold, redistributed or otherwise used for commercial purposes. All illustrations and images included in CareNotes® are the copyrighted property of A.D.A.M., Inc.  or [employer]

## 2025-03-18 NOTE — ASSESSMENT & PLAN NOTE
Pt was strated on a new BP meds - BP is great today    Orders:    ezetimibe (ZETIA) 10 mg tablet; Take 1 tablet (10 mg total) by mouth daily    Comprehensive metabolic panel; Future    Lipid Panel with Direct LDL reflex; Future    Hemoglobin A1C; Future

## 2025-03-18 NOTE — ASSESSMENT & PLAN NOTE
Pt states he does not take the meds anymore  Pt did not speak with psych about thisDepression Screening Follow-up Plan: Patient's depression screening was positive with a PHQ-9 score of 9. Pt will be making appt with a new psych

## 2025-03-18 NOTE — ASSESSMENT & PLAN NOTE
Pt will engage in low carb diet and increase activity  IF his numbers are same or worse at next visit will initiate treatment for DM  Orders:    ezetimibe (ZETIA) 10 mg tablet; Take 1 tablet (10 mg total) by mouth daily    Comprehensive metabolic panel; Future    Lipid Panel with Direct LDL reflex; Future    Hemoglobin A1C; Future

## 2025-03-18 NOTE — ASSESSMENT & PLAN NOTE
Will start zetial his Total is boarderline normal his LDL is to highh  Orders:    ezetimibe (ZETIA) 10 mg tablet; Take 1 tablet (10 mg total) by mouth daily    Comprehensive metabolic panel; Future    Lipid Panel with Direct LDL reflex; Future    Hemoglobin A1C; Future

## 2025-03-18 NOTE — PROGRESS NOTES
Name: Robin Stephens      : 1955      MRN: [id number]  Encounter Provider: Frank Lombardi, DO  Encounter Date: 3/18/2025   Encounter department: [facility]    Assessment & Plan  Medicare annual wellness visit, subsequent         Hypertension, unspecified type  Pt was strated on a new BP meds - BP is great today    Orders:    ezetimibe (ZETIA) 10 mg tablet; Take 1 tablet (10 mg total) by mouth daily    Comprehensive metabolic panel; Future    Lipid Panel with Direct LDL reflex; Future    Hemoglobin A1C; Future    Cardiac sarcoidosis  Is seen at [facility]       Mixed hyperlipidemia  Will start zetial his Total is boarderline normal his LDL is to highh  Orders:    ezetimibe (ZETIA) 10 mg tablet; Take 1 tablet (10 mg total) by mouth daily    Comprehensive metabolic panel; Future    Lipid Panel with Direct LDL reflex; Future    Hemoglobin A1C; Future    Prediabetes  Pt will engage in low carb diet and increase activity  IF his numbers are same or worse at next visit will initiate treatment for DM  Orders:    ezetimibe (ZETIA) 10 mg tablet; Take 1 tablet (10 mg total) by mouth daily    Comprehensive metabolic panel; Future    Lipid Panel with Direct LDL reflex; Future    Hemoglobin A1C; Future    Cannabis dependence (HCC)         Dementia in other diseases classified elsewhere, unspecified severity, without behavioral disturbance, psychotic disturbance, mood disturbance, and anxiety (HCC)         Major depressive disorder, recurrent, moderate (HCC)  Pt states he does not take the meds anymore  Pt did not speak with psych about thisDepression Screening Follow-up Plan: Patient's depression screening was positive with a PHQ-9 score of 9.  Pt will be making appt with a new psych         Cardiomyopathy, unspecified type (HCC)         Dermatitis    Orders:    mometasone (ELOCON) 0.1 % cream; Apply topically daily To wrist    Dermatophytosis    Orders:    clotrimazole-betamethasone (LOTRISONE) 1-0.05 % cream;  Apply topically 2 (two) times a day To buttocks      Depression Screening and Follow-up Plan:   Yes      Preventive health issues were discussed with patient, and age appropriate screening tests were ordered as noted in patient's After Visit Summary. Personalized health advice and appropriate referrals for health education or preventive services given if needed, as noted in patient's After Visit Summary.    History of Present Illness     Pt is here for an AWV  Pt states he had a Neuclear stress test done at [facility] - pt states he was told he has one of his arteries are hardening - cardio has not gotten back to him yet  Pt states prior to his study he was advised to have a freasy diet - states he thinks tis was still in him    Pt states he messaged us about a rash he had on his left wrost and his buttock  Pt states he had a rectal itch feels like he wiped to muchPT still; has the rash.           Patient Care Team:  Frank Lombardi, DO as PCP - General (Family Medicine)    Review of Systems   Skin:  Positive for rash.     Medical History Reviewed by provider this encounter:  Tobacco  Allergies  Meds  Problems  Med Hx  Surg Hx  Fam Hx       Annual Wellness Visit Questionnaire   Robin is here for his Subsequent Wellness visit. Last Medicare Wellness visit information reviewed, patient interviewed, no change since last AWV.     Health Risk Assessment:   Patient rates overall health as good. Patient feels that their physical health rating is slightly worse. Patient is satisfied with their life. Eyesight was rated as slightly worse. Hearing was rated as slightly worse. Patient feels that their emotional and mental health rating is same. Patients states they are sometimes angry. Patient states they are sometimes unusually tired/fatigued. Pain experienced in the last 7 days has been some. Patient's pain rating has been 3/10. Patient states that he has experienced no weight loss or gain in last 6 months.     Depression  Screening:   PHQ-9 Score: 9      Fall Risk Screening:   In the past year, patient has experienced: no history of falling in past year      Home Safety:  Patient does not have trouble with stairs inside or outside of their home. Patient has working smoke alarms and has working carbon monoxide detector. Home safety hazards include: none.     Nutrition:   Current diet is Regular.     Medications:   Patient is currently taking over-the-counter supplements. OTC medications include: see medication list. Patient is able to manage medications.     Activities of Daily Living (ADLs)/Instrumental Activities of Daily Living (IADLs):   Walk and transfer into and out of bed and chair?: Yes  Dress and groom yourself?: Yes    Bathe or shower yourself?: Yes    Feed yourself? Yes  Do your laundry/housekeeping?: Yes  Manage your money, pay your bills and track your expenses?: Yes  Make your own meals?: Yes    Do your own shopping?: Yes    Previous Hospitalizations:   Any hospitalizations or ED visits within the last 12 months?: No      Advance Care Planning:   Living will: Yes    Durable POA for healthcare: Yes    Advanced directive: Yes      Cognitive Screening:   Provider or family/friend/caregiver concerned regarding cognition?: No    PREVENTIVE SCREENINGS      Cardiovascular Screening:    General: Screening Not Indicated, History Lipid Disorder and Risks and Benefits Discussed    Due for: Lipid Panel      Diabetes Screening:     General: Screening Current and Risks and Benefits Discussed    Due for: Blood Glucose      Colorectal Cancer Screening:     General: Screening Current      Prostate Cancer Screening:    General: Screening Current      Osteoporosis Screening:    General: Patient Declines      Abdominal Aortic Aneurysm (AAA) Screening:    Risk factors include: age between 65-74 yo and tobacco use        General: Screening Current      Lung Cancer Screening:     General: Screening Not Indicated      Hepatitis C Screening:     General: Screening Current    Screening, Brief Intervention, and Referral to Treatment (SBIRT)     Screening  Typical number of drinks in a day: 0  Typical number of drinks in a week: 0  Interpretation: Low risk drinking behavior.    AUDIT-C Screenin) How often did you have a drink containing alcohol in the past year? never  2) How many drinks did you have on a typical day when you were drinking in the past year? 0  3) How often did you have 6 or more drinks on one occasion in the past year? never    AUDIT-C Score: 0  Interpretation: Score 0-3 (male): Negative screen for alcohol misuse    Single Item Drug Screening:  How often have you used an illegal drug (including marijuana) or a prescription medication for non-medical reasons in the past year? never    Single Item Drug Screen Score: 0  Interpretation: Negative screen for possible drug use disorder    Brief Intervention  Alcohol & drug use screenings were reviewed. No concerns regarding substance use disorder identified.     Social Drivers of Health     Financial Resource Strain: Low Risk  (2024)    Overall Financial Resource Strain (CARDIA)     Difficulty of Paying Living Expenses: Not hard at all   Food Insecurity: No Food Insecurity (3/11/2025)    Hunger Vital Sign     Worried About Running Out of Food in the Last Year: Never true     Ran Out of Food in the Last Year: Never true   Transportation Needs: No Transportation Needs (3/11/2025)    PRAPARE - Transportation     Lack of Transportation (Medical): No     Lack of Transportation (Non-Medical): No   Housing Stability: Low Risk  (3/11/2025)    Housing Stability Vital Sign     Unable to Pay for Housing in the Last Year: No     Number of Times Moved in the Last Year: 0     Homeless in the Last Year: No   Utilities: Not At Risk (3/11/2025)    [facility] Utilities     Threatened with loss of utilities: No     No results found.    Objective   /72   Pulse 72   Temp (!) 96.6 °F (35.9 °C)   Resp 16   Ht  "5' 8\" (1.727 m)   Wt 73.8 kg (162 lb 12.8 oz)   BMI 24.75 kg/m²     Physical Exam  Skin:     Findings: Rash present.         "

## 2025-04-16 ENCOUNTER — OFFICE VISIT (OUTPATIENT)
Dept: URGENT CARE | Facility: CLINIC | Age: 70
End: 2025-04-16
Payer: MEDICARE

## 2025-04-16 VITALS
SYSTOLIC BLOOD PRESSURE: 133 MMHG | HEART RATE: 71 BPM | WEIGHT: 161.4 LBS | OXYGEN SATURATION: 98 % | TEMPERATURE: 98.3 F | RESPIRATION RATE: 24 BRPM | DIASTOLIC BLOOD PRESSURE: 75 MMHG | BODY MASS INDEX: 24.54 KG/M2

## 2025-04-16 DIAGNOSIS — J06.9 UPPER RESPIRATORY TRACT INFECTION, UNSPECIFIED TYPE: Primary | ICD-10-CM

## 2025-04-16 PROCEDURE — 99203 OFFICE O/P NEW LOW 30 MIN: CPT | Performed by: PHYSICIAN ASSISTANT

## 2025-04-16 PROCEDURE — 99213 OFFICE O/P EST LOW 20 MIN: CPT | Performed by: PHYSICIAN ASSISTANT

## 2025-04-16 RX ORDER — HYDROCORTISONE 25 MG/G
CREAM TOPICAL
COMMUNITY
Start: 2024-12-09

## 2025-04-16 RX ORDER — EPLERENONE 50 MG/1
50 TABLET ORAL DAILY
COMMUNITY

## 2025-04-16 NOTE — PROGRESS NOTES
[facility]        NAME: Robin Stephens is a 69 y.o. male  : 1955    MRN: [id number]  DATE: 2025  TIME: 6:52 PM    Assessment and Plan   Upper respiratory tract infection, unspecified type [J06.9]  1. Upper respiratory tract infection, unspecified type          Symptoms improving.  Discussed that postviral cough can last several weeks.  Encouraged use of nonsedating antihistamine for seasonal allergy symptoms.  Discussed importance of staying hydrated. Discussed strict return to care precautions as well as red flag symptoms which should prompt immediate ED referral. Pt verbalized understanding and is in agreement with plan.  Please follow up with your primary care provider within the next week. Please remember that your visit today was with an urgent care provider and should not replace follow up with your primary care provider for chronic medical issues or annual physicals.       Patient Instructions       Follow up with PCP in 3-5 days.  Proceed to  ER if symptoms worsen.    If tests are performed, our office will contact you with results only if changes need to made to the care plan discussed with you at the visit. You can review your full results on [facility].    Chief Complaint     Chief Complaint   Patient presents with    Cold Like Symptoms    Cough     Reports productive cough, sneezing, runny nose x 1 week. Using OTC cough medicine.          History of Present Illness       Patient is a 69-year-old male with PMH CAD, HTN, alcohol dependence presenting with runny nose, cough, sneezing x 1 week.  Cough sometimes productive of clear sputum, but does report it is overall improving.  No fevers, ear pain, headache, sore throat.  Does have a history of seasonal allergies and has been taking Singulair.        Review of Systems   Review of Systems   Constitutional:  Positive for fatigue. Negative for chills, diaphoresis and fever.   HENT:  Positive for congestion and sneezing.  Negative for ear pain, postnasal drip, rhinorrhea, sinus pain, sore throat and trouble swallowing.    Eyes:  Negative for pain and redness.   Respiratory:  Positive for cough. Negative for chest tightness and wheezing.    Cardiovascular:  Negative for chest pain and leg swelling.   Gastrointestinal:  Negative for diarrhea, nausea and vomiting.   Musculoskeletal:  Negative for myalgias.   Neurological:  Negative for dizziness, weakness and headaches.         Current Medications       Current Outpatient Medications:     amLODIPine (NORVASC) 10 mg tablet, Take 10 mg by mouth every evening  , Disp: , Rfl:     aspirin (ECOTRIN LOW STRENGTH) 81 mg EC tablet, TAKE ONE TABLET BY MOUTH DAILY FOR THE HEART., Disp: , Rfl:     atorvastatin (LIPITOR) 80 mg tablet, Take 1 tablet (80 mg total) by mouth in the morning, Disp: 90 tablet, Rfl: 3    CARBOXYMETHYLCELLULOSE SODIUM OP, Apply to eye, Disp: , Rfl:     Cholecalciferol 50 MCG (2000 UT) TABS, Take 50 mcg by mouth daily, Disp: , Rfl:     clotrimazole-betamethasone (LOTRISONE) 1-0.05 % cream, Apply topically 2 (two) times a day To buttocks (Patient taking differently: Apply topically 2 (two) times a day To buttocks), Disp: 45 g, Rfl: 0    cyclobenzaprine (FLEXERIL) 10 mg tablet, Take 10 mg by mouth daily as needed  , Disp: , Rfl:     Elastic Bandages & Supports ([employer] Lumbar/Back Support Brace) MISC, Use daily, Disp: 1 each, Rfl: 0    eplerenone (INSPRA) 50 MG tablet, Take 50 mg by mouth daily, Disp: , Rfl:     ezetimibe (ZETIA) 10 mg tablet, Take 1 tablet (10 mg total) by mouth daily, Disp: 90 tablet, Rfl: 3    fluticasone (FLONASE) 50 mcg/act nasal spray, as needed, Disp: , Rfl:     folic acid (FOLVITE) 1 mg tablet, 1 mg, Disp: , Rfl:     furosemide (LASIX) 20 mg tablet, if needed, Disp: , Rfl:     hydrochlorothiazide (HYDRODIURIL) 25 mg tablet, Take 12.5 mg by mouth daily, Disp: , Rfl:     hydrocortisone 2.5 % cream, apply 1 APPLICATION twice a day for 10 days, Disp: , Rfl:      ibuprofen (MOTRIN) 600 mg tablet, Take 600 mg by mouth every 6 (six) hours as needed for moderate pain, Disp: , Rfl:     lidocaine (LIDODERM) 5 %, APPLY 1 PATCH ON SKIN DAILY FOR PAIN. DO NOT WEAR FOR LONGER THAN 12 HOURS IN ANY 24 HOUR PERIOD. WASH YOUR HANDS AFTER APPLYING A PATCH., Disp: , Rfl:     losartan (COZAAR) 100 MG tablet, Take 100 mg by mouth daily, Disp: , Rfl:     melatonin 3 mg, Take 3 mg by mouth, Disp: , Rfl:     metoprolol succinate (TOPROL-XL) 25 mg 24 hr tablet, Take 1 tablet (25 mg total) by mouth every morning, Disp: 90 tablet, Rfl: 1    metoprolol succinate (TOPROL-XL) 50 mg 24 hr tablet, Take 1 tablet (50 mg total) by mouth every morning, Disp: 90 tablet, Rfl: 1    mometasone (ELOCON) 0.1 % cream, Apply topically daily To wrist (Patient taking differently: Apply topically daily To wrist), Disp: 45 g, Rfl: 0    montelukast (SINGULAIR) 10 mg tablet, Take 10 mg by mouth, Disp: , Rfl:     Multiple Vitamin (MULTIVITAMIN PO), TAKE 1 CAP/TAB BY MOUTH DAILY FOR VITAMIN SUPPLEMENT, Disp: , Rfl:     omeprazole (PriLOSEC) 20 mg delayed release capsule, take 1 capsule by mouth once daily, Disp: 60 capsule, Rfl: 1    polyvinyl alcohol (LIQUIFILM TEARS) 1.4 % ophthalmic solution, Administer 1 drop to both eyes 4 (four) times a day, Disp: , Rfl:     sodium chloride (OCEAN) 0.65 % nasal spray, 1 spray into each nostril 4 (four) times a day as needed, Disp: , Rfl:     budesonide-formoterol (SYMBICORT) 160-4.5 mcg/act inhaler, Inhale 2 puffs 2 (two) times a day (Patient not taking: Reported on 4/16/2025), Disp: , Rfl:     buPROPion (WELLBUTRIN XL) 150 mg 24 hr tablet, Take 150 mg by mouth   (Patient not taking: Reported on 4/16/2025), Disp: , Rfl:     Current Allergies     Allergies as of 04/16/2025 - Reviewed 04/16/2025   Allergen Reaction Noted    Bee pollen Other (See Comments) 05/06/2019    Methotrexate Other (See Comments) 12/09/2022    Moxifloxacin GI Intolerance 10/21/2009    Pollen extract Other (See  Comments) 05/06/2019    Lisinopril Cough 04/06/2009            The following portions of the patient's history were reviewed and updated as appropriate: allergies, current medications, past family history, past medical history, past social history, past surgical history and problem list.     Past Medical History:   Diagnosis Date    Alcohol dependence, uncomplicated (HCC) 10/28/2022    Coronary artery disease     Hyperlipemia     Hypertension     Irregular heart beat     bradycardia       Past Surgical History:   Procedure Laterality Date    ANTERIOR CRUCIATE LIGAMENT REPAIR Left 2001    CARDIAC DEFIBRILLATOR PLACEMENT      CARDIAC LOOP RECORDER  03/2022    MD from [facility] possible pacer placement    CARDIAC PACEMAKER PLACEMENT      CARDIAC SURGERY  2007    CABG x2       Family History   Problem Relation Age of Onset    Arthritis Mother     Leukemia Maternal Aunt     Mental illness Neg Hx          Medications have been verified.        Objective   /75   Pulse 71   Temp 98.3 °F (36.8 °C) (Oral)   Resp (!) 24   Wt 73.2 kg (161 lb 6.4 oz)   SpO2 98%   BMI 24.54 kg/m²        Physical Exam     Physical Exam  Vitals and nursing note reviewed.   Constitutional:       General: He is not in acute distress.     Appearance: Normal appearance. He is not toxic-appearing.   HENT:      Head: Normocephalic and atraumatic.      Right Ear: Tympanic membrane, ear canal and external ear normal.      Left Ear: Tympanic membrane, ear canal and external ear normal.      Nose: No congestion.      Mouth/Throat:      Mouth: Mucous membranes are moist.      Pharynx: Oropharynx is clear. No oropharyngeal exudate or posterior oropharyngeal erythema.   Eyes:      Conjunctiva/sclera: Conjunctivae normal.      Pupils: Pupils are equal, round, and reactive to light.   Cardiovascular:      Rate and Rhythm: Normal rate and regular rhythm.      Heart sounds: Normal heart sounds.   Pulmonary:      Effort: Pulmonary effort is normal. No  respiratory distress.      Breath sounds: Normal breath sounds. No wheezing, rhonchi or rales.   Musculoskeletal:      Cervical back: Normal range of motion and neck supple.   Lymphadenopathy:      Cervical: No cervical adenopathy.   Skin:     General: Skin is warm and dry.      Capillary Refill: Capillary refill takes less than 2 seconds.   Neurological:      Mental Status: He is alert and oriented to person, place, and time.   Psychiatric:         Behavior: Behavior normal.

## 2025-06-16 ENCOUNTER — RA CDI HCC (OUTPATIENT)
Dept: OTHER | Facility: HOSPITAL | Age: 70
End: 2025-06-16

## 2025-06-17 ENCOUNTER — APPOINTMENT (OUTPATIENT)
Dept: LAB | Facility: CLINIC | Age: 70
End: 2025-06-17
Attending: FAMILY MEDICINE
Payer: MEDICARE

## 2025-06-17 DIAGNOSIS — R73.03 PREDIABETES: ICD-10-CM

## 2025-06-17 DIAGNOSIS — E78.2 MIXED HYPERLIPIDEMIA: ICD-10-CM

## 2025-06-17 DIAGNOSIS — I10 HYPERTENSION, UNSPECIFIED TYPE: ICD-10-CM

## 2025-06-17 LAB
ALBUMIN SERPL BCG-MCNC: 4.1 G/DL (ref 3.5–5)
ALP SERPL-CCNC: 99 U/L (ref 34–104)
ALT SERPL W P-5'-P-CCNC: 29 U/L (ref 7–52)
ANION GAP SERPL CALCULATED.3IONS-SCNC: 11 MMOL/L (ref 4–13)
AST SERPL W P-5'-P-CCNC: 24 U/L (ref 13–39)
BILIRUB SERPL-MCNC: 0.61 MG/DL (ref 0.2–1)
BUN SERPL-MCNC: 29 MG/DL (ref 5–25)
CALCIUM SERPL-MCNC: 9.2 MG/DL (ref 8.4–10.2)
CHLORIDE SERPL-SCNC: 104 MMOL/L (ref 96–108)
CHOLEST SERPL-MCNC: 152 MG/DL (ref ?–200)
CO2 SERPL-SCNC: 26 MMOL/L (ref 21–32)
CREAT SERPL-MCNC: 1.14 MG/DL (ref 0.6–1.3)
EST. AVERAGE GLUCOSE BLD GHB EST-MCNC: 151 MG/DL
GFR SERPL CREATININE-BSD FRML MDRD: 65 ML/MIN/1.73SQ M
GLUCOSE P FAST SERPL-MCNC: 95 MG/DL (ref 65–99)
HBA1C MFR BLD: 6.9 %
HDLC SERPL-MCNC: 38 MG/DL
LDLC SERPL CALC-MCNC: 93 MG/DL (ref 0–100)
POTASSIUM SERPL-SCNC: 4.3 MMOL/L (ref 3.5–5.3)
PROT SERPL-MCNC: 7.2 G/DL (ref 6.4–8.4)
SODIUM SERPL-SCNC: 141 MMOL/L (ref 135–147)
TRIGL SERPL-MCNC: 107 MG/DL (ref ?–150)

## 2025-06-17 PROCEDURE — 83036 HEMOGLOBIN GLYCOSYLATED A1C: CPT

## 2025-06-17 PROCEDURE — 36415 COLL VENOUS BLD VENIPUNCTURE: CPT

## 2025-06-17 PROCEDURE — 80061 LIPID PANEL: CPT

## 2025-06-17 PROCEDURE — 80053 COMPREHEN METABOLIC PANEL: CPT

## 2025-06-20 ENCOUNTER — OFFICE VISIT (OUTPATIENT)
Dept: CARDIOLOGY CLINIC | Facility: CLINIC | Age: 70
End: 2025-06-20
Payer: MEDICARE

## 2025-06-20 VITALS
OXYGEN SATURATION: 96 % | BODY MASS INDEX: 28 KG/M2 | DIASTOLIC BLOOD PRESSURE: 62 MMHG | WEIGHT: 164 LBS | HEIGHT: 64 IN | HEART RATE: 62 BPM | SYSTOLIC BLOOD PRESSURE: 114 MMHG

## 2025-06-20 DIAGNOSIS — I1A.0 RESISTANT HYPERTENSION: ICD-10-CM

## 2025-06-20 DIAGNOSIS — R00.1 BRADYCARDIA: ICD-10-CM

## 2025-06-20 DIAGNOSIS — Z95.810 ICD (IMPLANTABLE CARDIOVERTER-DEFIBRILLATOR) IN PLACE: ICD-10-CM

## 2025-06-20 DIAGNOSIS — E78.2 MIXED HYPERLIPIDEMIA: ICD-10-CM

## 2025-06-20 DIAGNOSIS — D86.85 CARDIAC SARCOIDOSIS: Primary | ICD-10-CM

## 2025-06-20 DIAGNOSIS — I47.19 ATRIAL TACHYCARDIA (HCC): ICD-10-CM

## 2025-06-20 DIAGNOSIS — I45.10 RBBB: ICD-10-CM

## 2025-06-20 DIAGNOSIS — I25.10 CORONARY ARTERY DISEASE INVOLVING NATIVE CORONARY ARTERY OF NATIVE HEART WITHOUT ANGINA PECTORIS: ICD-10-CM

## 2025-06-20 PROCEDURE — 93000 ELECTROCARDIOGRAM COMPLETE: CPT | Performed by: INTERNAL MEDICINE

## 2025-06-20 PROCEDURE — 99214 OFFICE O/P EST MOD 30 MIN: CPT | Performed by: INTERNAL MEDICINE

## 2025-06-20 RX ORDER — EVOLOCUMAB 140 MG/ML
INJECTION, SOLUTION SUBCUTANEOUS
Qty: 6 ML | Refills: 3 | Status: SHIPPED | OUTPATIENT
Start: 2025-06-20

## 2025-06-20 RX ORDER — VENLAFAXINE HYDROCHLORIDE 75 MG/1
75 CAPSULE, EXTENDED RELEASE ORAL
COMMUNITY
Start: 2025-05-22

## 2025-06-20 NOTE — PROGRESS NOTES
[facility]  [id number]   [place] [address]  Cardiology Consultation    Robin Stephens  [id number]  1955      Consult for:CABG/Sarcoidosis  Appreciate consult by: Frank Lombardi, DO    1. Cardiac sarcoidosis  POCT ECG      2. ICD (implantable cardioverter-defibrillator) in place  POCT ECG      3. Atrial tachycardia (HCC)  POCT ECG      4. Coronary artery disease involving native coronary artery of native heart without angina pectoris  POCT ECG      5. Mixed hyperlipidemia  POCT ECG      6. RBBB  POCT ECG      7. Bradycardia  POCT ECG      8. Resistant hypertension  POCT ECG         Discussion/Summary:   Coronary artery disease status post prior bypass grafting- his LDL is not at goal. Last echo 2D with normal ejection fraction wall motion. Atorvastatin 80mg  + zetia 10mg + and aspirin. He is high risk for secondary events without LDL below 70 optimally below 55.  We will try a PSK9 inhibitor    A1c 6.9--he has close follow-up with his PCP to consider starting oral antiglycemic medication such as metformin or SGLT2.  He is at high risk for cardiac complications.  He has room for dietary modification    Palpitations/previous bradycardia/suspected Cardiac Sarcoidosis- reviewed patient's MRI and PET scan report.  Echo 2D with normal ejection fraction and mild concentric hypertrophy. S/P ICD placement. He could not tolerate methotextrate. He is also on the bactrim + folic acid.  Followed at outside advanced heart failure center. Weaned off prednisone. Negative pet 11/2023    Atrial tachycardia s/p ablation- metoprolol 100mg currently    Hypertension- controlled on hydrochlorothiazide 25 mg and amlodipine 10 mg every evening. Losartan 100mg Discussed with patient by wearing compression when sitting standing. He took furosemide     PTSD- currently on SSRI    HPI:   66-year-old disabled  history of coronary artery bypass grafting 2 vessel in 2007,  hypertension with recent workup at an outside not work for episodes of tachy-hol[place] with advanced imaging suggestive of a inflammatory and infiltrative process.  He has plans for a defibrillator placement.  He still has periodic tachycardia episodes.  He denies having syncope.  He states not being as active currently.  Denies having any severe chest discomfort.  There is no history of atrial fibrillation.  He is compliant with his medications.  He denies having any fevers or chills.  Denies any recent viral symptoms.  Denies having any nausea vomiting diarrhea.    10/7: Denies dizziness or chest pain. Currently followed at [facility] advanced heart failure.  He was not able to tolerate methotrexate.  He is currently on prednisone.  His pacemaker pocket is well healed.    4/28/23:  Still with some shortness of breath.  Denies having chest heaviness.  Denies having any device firing.  We discussed about his lipid panel.    10/13/2023: He was having increased palpitations and his metoprolol was increased.  We reviewed through his abnormal blood work.  He unfortunately ran out of his atorvastatin.  He is currently intermittently atrial paced with occasional PVCs. He joined the gym- lower back pain/no dyspnea    Recent Visit: Denies having chest pain or major change in breathing. Denies significant palpitations. Compliant with therapy. Reviewed labwork together. A1c elevated. LDL not at goal.  He reports having some dietary indiscretion.      PMH  CABG- 2 vessel [place]- severe stabbing pain    Social Hx  - 2005 [place]  Disabled [place]  All Doctors were in [facility]  Not much physical activity  Former smoking- 10 years-- 1-2 cig light- 2 years a pack    Family Hx  Sister- 70- couple of heart attack. No hx of pacemaker    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[employer]    Past Medical History:   Diagnosis Date    Alcohol dependence, uncomplicated (HCC) 10/28/2022    Coronary artery disease     Hyperlipemia     Hypertension      Irregular heart beat     bradycardia     Social History     Socioeconomic History    Marital status: /Civil Union     Spouse name: Not on file    Number of children: Not on file    Years of education: Not on file    Highest education level: Not on file   Occupational History    Not on file   Tobacco Use    Smoking status: Former     Current packs/day: 0.25     Average packs/day: 0.3 packs/day for 56.5 years (14.1 ttl pk-yrs)     Types: Cigarettes     Start date: 1969     Passive exposure: Past    Smokeless tobacco: Never   Vaping Use    Vaping status: Never Used   Substance and Sexual Activity    Alcohol use: Never    Drug use: Never    Sexual activity: Not on file   Other Topics Concern    Not on file   Social History Narrative    Not on file     Social Drivers of Health     Financial Resource Strain: Low Risk  (2/5/2024)    Overall Financial Resource Strain (CARDIA)     Difficulty of Paying Living Expenses: Not hard at all   Food Insecurity: No Food Insecurity (3/11/2025)    Nursing - Inadequate Food Risk Classification     Worried About Running Out of Food in the Last Year: Never true     Ran Out of Food in the Last Year: Never true     Ran Out of Food in the Last Year: Not on file   Transportation Needs: No Transportation Needs (3/11/2025)    PRAPARE - Transportation     Lack of Transportation (Medical): No     Lack of Transportation (Non-Medical): No   Physical Activity: Not on file   Stress: Not on file   Social Connections: Not on file   Intimate Partner Violence: Not on file   Housing Stability: Low Risk  (3/11/2025)    Housing Stability Vital Sign     Unable to Pay for Housing in the Last Year: No     Number of Times Moved in the Last Year: 0     Homeless in the Last Year: No      Family History   Problem Relation Name Age of Onset    Arthritis Mother      Leukemia Maternal Aunt      Mental illness Neg Hx       Past Surgical History:   Procedure Laterality Date    ANTERIOR CRUCIATE LIGAMENT REPAIR  Left 2001    CARDIAC DEFIBRILLATOR PLACEMENT      CARDIAC LOOP RECORDER  03/2022    MD from [facility] possible pacer placement    CARDIAC PACEMAKER PLACEMENT      CARDIAC SURGERY  2007    CABG x2       Current Outpatient Medications:     amLODIPine (NORVASC) 10 mg tablet, Take 10 mg by mouth every evening, Disp: , Rfl:     aspirin (ECOTRIN LOW STRENGTH) 81 mg EC tablet, , Disp: , Rfl:     atorvastatin (LIPITOR) 80 mg tablet, Take 1 tablet (80 mg total) by mouth in the morning, Disp: 90 tablet, Rfl: 3    CARBOXYMETHYLCELLULOSE SODIUM OP, Apply to eye, Disp: , Rfl:     Cholecalciferol 50 MCG (2000 UT) TABS, Take 50 mcg by mouth in the morning., Disp: , Rfl:     clotrimazole-betamethasone (LOTRISONE) 1-0.05 % cream, Apply topically 2 (two) times a day To buttocks, Disp: 45 g, Rfl: 0    cyclobenzaprine (FLEXERIL) 10 mg tablet, Take 10 mg by mouth daily as needed, Disp: , Rfl:     Elastic Bandages & Supports ([employer] Lumbar/Back Support Brace) MISC, Use daily, Disp: 1 each, Rfl: 0    eplerenone (INSPRA) 50 MG tablet, Take 50 mg by mouth in the morning., Disp: , Rfl:     ezetimibe (ZETIA) 10 mg tablet, Take 1 tablet (10 mg total) by mouth daily, Disp: 90 tablet, Rfl: 3    fluticasone (FLONASE) 50 mcg/act nasal spray, as needed, Disp: , Rfl:     folic acid (FOLVITE) 1 mg tablet, 1 mg, Disp: , Rfl:     furosemide (LASIX) 20 mg tablet, if needed, Disp: , Rfl:     hydrochlorothiazide (HYDRODIURIL) 25 mg tablet, Take 12.5 mg by mouth in the morning., Disp: , Rfl:     hydrocortisone 2.5 % cream, , Disp: , Rfl:     ibuprofen (MOTRIN) 600 mg tablet, Take 600 mg by mouth every 6 (six) hours as needed for moderate pain, Disp: , Rfl:     lidocaine (LIDODERM) 5 %, , Disp: , Rfl:     losartan (COZAAR) 100 MG tablet, Take 100 mg by mouth in the morning., Disp: , Rfl:     melatonin 3 mg, Take 3 mg by mouth, Disp: , Rfl:     metoprolol succinate (TOPROL-XL) 25 mg 24 hr tablet, Take 1 tablet (25 mg total) by mouth every morning, Disp: 90 tablet,  "Rfl: 1    metoprolol succinate (TOPROL-XL) 50 mg 24 hr tablet, Take 1 tablet (50 mg total) by mouth every morning, Disp: 90 tablet, Rfl: 1    mometasone (ELOCON) 0.1 % cream, Apply topically daily To wrist, Disp: 45 g, Rfl: 0    montelukast (SINGULAIR) 10 mg tablet, Take 10 mg by mouth, Disp: , Rfl:     Multiple Vitamin (MULTIVITAMIN PO), , Disp: , Rfl:     omeprazole (PriLOSEC) 20 mg delayed release capsule, take 1 capsule by mouth once daily, Disp: 60 capsule, Rfl: 1    sodium chloride (OCEAN) 0.65 % nasal spray, 1 spray into each nostril as needed in the morning and 1 spray as needed at noon and 1 spray as needed in the evening and 1 spray as needed before bedtime., Disp: , Rfl:     venlafaxine (EFFEXOR-XR) 75 mg 24 hr capsule, Take 75 mg by mouth, Disp: , Rfl:     budesonide-formoterol (SYMBICORT) 160-4.5 mcg/act inhaler, Inhale 2 puffs 2 (two) times a day (Patient not taking: Reported on 4/16/2025), Disp: , Rfl:     buPROPion (WELLBUTRIN XL) 150 mg 24 hr tablet, Take 150 mg by mouth   (Patient not taking: Reported on 4/16/2025), Disp: , Rfl:     polyvinyl alcohol (LIQUIFILM TEARS) 1.4 % ophthalmic solution, Administer 1 drop to both eyes 4 (four) times a day, Disp: , Rfl:   Allergies   Allergen Reactions    Bee Pollen Other (See Comments)     Itchy eyes/nose, sneezing, clogged ears, increased cough/chest tightness.    Methotrexate Other (See Comments)     Felt unwell, unsteady    Moxifloxacin GI Intolerance     W/ GI bleed      Pollen Extract Other (See Comments)     Itchy eyes/nose, sneezing, clogged ears, increased cough/chest tightness.    Lisinopril Cough     Vitals:    06/20/25 1031   BP: 114/62   BP Location: Right arm   Patient Position: Sitting   Cuff Size: Standard   Pulse: 62   SpO2: 96%   Weight: 74.4 kg (164 lb)   Height: 5' 4\" (1.626 m)       Review of Systems:   Review of Systems   Respiratory:  Negative for chest tightness.    Cardiovascular:  Positive for palpitations.       Vitals:    06/20/25 " "1031   BP: 114/62   BP Location: Right arm   Patient Position: Sitting   Cuff Size: Standard   Pulse: 62   SpO2: 96%   Weight: 74.4 kg (164 lb)   Height: 5' 4\" (1.626 m)     Physical Examination:   Physical Exam  Constitutional:       General: He is not in acute distress.     Appearance: He is well-developed. He is not diaphoretic.   HENT:      Head: Normocephalic and atraumatic.      Right Ear: External ear normal.      Left Ear: External ear normal.     Eyes:      General: No scleral icterus.        Right eye: No discharge.         Left eye: No discharge.      Conjunctiva/sclera: Conjunctivae normal.      Pupils: Pupils are equal, round, and reactive to light.     Neck:      Thyroid: No thyromegaly.      Vascular: No JVD.      Trachea: No tracheal deviation.     Cardiovascular:      Rate and Rhythm: Normal rate and regular rhythm.      Heart sounds: Murmur heard.      No friction rub. Gallop present.      Comments: ICD well-healed  Pulmonary:      Effort: Pulmonary effort is normal. No respiratory distress.      Breath sounds: Normal breath sounds. No stridor. No wheezing or rales.   Chest:      Chest wall: No tenderness.   Abdominal:      General: Bowel sounds are normal. There is no distension.      Palpations: Abdomen is soft. There is no mass.      Tenderness: There is no abdominal tenderness. There is no guarding or rebound.     Musculoskeletal:         General: No tenderness or deformity. Normal range of motion.      Cervical back: Normal range of motion and neck supple.     Skin:     General: Skin is warm and dry.      Coloration: Skin is not pale.      Findings: No erythema or rash.     Neurological:      Mental Status: He is alert and oriented to person, place, and time.      Cranial Nerves: No cranial nerve deficit.      Motor: No abnormal muscle tone.      Coordination: Coordination normal.      Deep Tendon Reflexes: Reflexes are normal and symmetric. Reflexes normal.     Psychiatric:         Behavior: " Behavior normal.         Thought Content: Thought content normal.         Judgment: Judgment normal.         Labs:     Lab Results   Component Value Date    WBC 5.58 03/13/2025    HGB 13.1 03/13/2025    HCT 42.6 03/13/2025    MCV 82 03/13/2025    RDW 14.9 03/13/2025     03/13/2025     BMP:  Lab Results   Component Value Date    SODIUM 141 06/17/2025    K 4.3 06/17/2025     06/17/2025    CO2 26 06/17/2025    BUN 29 (H) 06/17/2025    CREATININE 1.14 06/17/2025    GLUC 106 09/26/2022    GLUF 95 06/17/2025    CALCIUM 9.2 06/17/2025    CORRECTEDCA 9.9 10/17/2022    EGFR 65 06/17/2025     LFT:  Lab Results   Component Value Date    AST 24 06/17/2025    ALT 29 06/17/2025    ALKPHOS 99 06/17/2025    TP 7.2 06/17/2025    ALB 4.1 06/17/2025      Lipid Profile:   Lab Results   Component Value Date    CHOLESTEROL 152 06/17/2025    HDL 38 (L) 06/17/2025    LDLCALC 93 06/17/2025    TRIG 107 06/17/2025     Lab Results   Component Value Date    CHOLESTEROL 152 06/17/2025    CHOLESTEROL 200 (H) 03/13/2025    CHOLESTEROL 194 03/13/2025       Imaging & Testing   I have personally reviewed pertinent reports.      Cardiac Testing   Sarcoidosis:   1.  Solitary, small area of mildly increased FDG uptake in basal lateral wall suggesting active inflammation.  The other sites that showed late gadolinium enhancement that are without abnormal FDG activity are likely fibrotic.     2.  Small subpleural nodule associated with right major fissure, too small for metabolic evaluation.  Nonspecific.  Continued dedicated CT followup in 3-6 months recommended, as clinically indicated.     3.  No additional FDG avid tissue.     Cardiac MRI:  1. The LV is normal in size with borderline systolic function; LVEF 52%.   2. RV is mildly dilated (RVEDVI of 91 ml/sq m) with mildly diminished systolic function (RVEF of 37%).   3. Patchy areas of LGE at the basal septum at the superior and inferior LV - RV junction and mid LV inferior LV - RV  "junction, that could represent nonspecific myocardial fibrosis.   4. 2.8 x 2.2 cm enhancing lesion in the liver that is incompletely evaluated on this cardiac focussed examination. Recommend dedicated liver MRI for further evaluation.    CT Chest 8/2020: IMPRESSION:   1.  Mild widespread bronchiectasis.   2.  No significant bronchial wall thickening or air trapping.   3.  Nonspecific pulmonary nodule measuring up to 5 x 4 mm.   4.  Nonspecific hypodense right hepatic lobe 3 cm lesion.      1. No evidence of myocardial inflammation/sarcoidosis.     2. Stable right upper lobe perifissural nodule, below resolution of PET.     3. New asymmetric activity at the left adrenal gland, correlate to MRI.     4. New FDG-avid focus at the anterior aspect of the left testis, correlate to ultrasound.     5. New mild diffuse activity throughout the thyroid gland, probably related to thyroiditis.     6. Stable mildly FDG-avid bilateral hilar foci, stable mild FDG-avidity at the gastroesophageal junction, stable mild FDG-avidity along the median sternotomy, probably inflammatory.     7. Stable heterogeneous activity throughout the osseous structures including stable mildly FDG-avid focus in the proximal right femur without CT correlate, possibly.           EKG: Personally reviewed.    Normal sinus rhythm bifascicular heart block no acute ST changes  Atrial paced rhythm with periodic pvc    Martin Glasgow MD [facility]  [phone]  Please call with any questions or suggestions    Counseling :  A description of the counseling:  leqvio  Goals and Barriers:  Patient's ability to self care:  Medication side effect reviewed with patient in detail and all their questions answered.    \"Portions of the record may have been created with voice recognition software. Occasional wrong word or \"sound a like\" substitutions may have occurred due to the inherent limitations of voice recognition software. Read the chart carefully and recognize, " "using context, where substitutions have occurred. Please call if you have any questions. \"    "

## 2025-06-23 ENCOUNTER — OFFICE VISIT (OUTPATIENT)
Dept: FAMILY MEDICINE CLINIC | Facility: CLINIC | Age: 70
End: 2025-06-23
Payer: MEDICARE

## 2025-06-23 VITALS
OXYGEN SATURATION: 96 % | WEIGHT: 165 LBS | DIASTOLIC BLOOD PRESSURE: 66 MMHG | HEART RATE: 62 BPM | SYSTOLIC BLOOD PRESSURE: 104 MMHG | TEMPERATURE: 97.8 F | HEIGHT: 68 IN | BODY MASS INDEX: 25.01 KG/M2 | RESPIRATION RATE: 16 BRPM

## 2025-06-23 DIAGNOSIS — E78.2 MIXED HYPERLIPIDEMIA: ICD-10-CM

## 2025-06-23 DIAGNOSIS — E11.59 TYPE 2 DIABETES MELLITUS WITH OTHER CIRCULATORY COMPLICATION, WITHOUT LONG-TERM CURRENT USE OF INSULIN (HCC): ICD-10-CM

## 2025-06-23 DIAGNOSIS — I10 HYPERTENSION, UNSPECIFIED TYPE: Primary | ICD-10-CM

## 2025-06-23 DIAGNOSIS — F02.80 DEMENTIA IN OTHER DISEASES CLASSIFIED ELSEWHERE, UNSPECIFIED SEVERITY, WITHOUT BEHAVIORAL DISTURBANCE, PSYCHOTIC DISTURBANCE, MOOD DISTURBANCE, AND ANXIETY (HCC): ICD-10-CM

## 2025-06-23 DIAGNOSIS — I25.10 CORONARY ARTERY DISEASE INVOLVING NATIVE CORONARY ARTERY OF NATIVE HEART WITHOUT ANGINA PECTORIS: ICD-10-CM

## 2025-06-23 DIAGNOSIS — R73.03 PREDIABETES: ICD-10-CM

## 2025-06-23 DIAGNOSIS — I25.119 ATHEROSCLEROSIS OF NATIVE CORONARY ARTERY OF NATIVE HEART WITH ANGINA PECTORIS (HCC): ICD-10-CM

## 2025-06-23 PROCEDURE — 99214 OFFICE O/P EST MOD 30 MIN: CPT | Performed by: FAMILY MEDICINE

## 2025-06-23 PROCEDURE — G2211 COMPLEX E/M VISIT ADD ON: HCPCS | Performed by: FAMILY MEDICINE

## 2025-06-23 NOTE — PROGRESS NOTES
Name: Robin Stephens      : 1955      MRN: [id number]  Encounter Provider: Frank Lombardi, DO  Encounter Date: 2025   Encounter department: [facility]  :  Assessment & Plan  Hypertension, unspecified type  Pt just seen by cardio  BP was lower at cardio office as per pt  NO lightheadedness       Atherosclerosis of native coronary artery of native heart with angina pectoris (HCC)    Orders:    Albumin / creatinine urine ratio; Future    Comprehensive metabolic panel; Future    Hemoglobin A1C; Future    Lipid Panel with Direct LDL reflex; Future    CBC and differential; Future    Dementia in other diseases classified elsewhere, unspecified severity, without behavioral disturbance, psychotic disturbance, mood disturbance, and anxiety (HCC)         Mixed hyperlipidemia    Orders:    Albumin / creatinine urine ratio; Future    Comprehensive metabolic panel; Future    Hemoglobin A1C; Future    Lipid Panel with Direct LDL reflex; Future    CBC and differential; Future    Prediabetes    Orders:    Albumin / creatinine urine ratio; Future    Comprehensive metabolic panel; Future    Hemoglobin A1C; Future    Lipid Panel with Direct LDL reflex; Future    CBC and differential; Future    Coronary artery disease involving native coronary artery of native heart without angina pectoris    Orders:    Albumin / creatinine urine ratio; Future    Comprehensive metabolic panel; Future    Hemoglobin A1C; Future    Lipid Panel with Direct LDL reflex; Future    CBC and differential; Future    Type 2 diabetes mellitus with other circulatory complication, without long-term current use of insulin (HCC)  Newly diagnosed  Iris exam did not work  Lab Results   Component Value Date    HGBA1C 6.9 (H) 2025       Orders:    Empagliflozin (JARDIANCE) 10 MG TABS tablet; Take 1 tablet (10 mg total) by mouth daily    Albumin / creatinine urine ratio; Future    Comprehensive metabolic panel; Future    Hemoglobin A1C;  "Future    Lipid Panel with Direct LDL reflex; Future    CBC and differential; Future    IRIS Diabetic eye exam           History of Present Illness   Pt is sched for a follow up    Pt sees DR Carrion        Review of Systems   Constitutional:  Negative for activity change, appetite change, chills, diaphoresis, fatigue, fever and unexpected weight change.   HENT:  Negative for congestion, dental problem, ear pain, mouth sores, sinus pressure, sinus pain, sore throat and trouble swallowing.    Eyes:  Negative for photophobia, discharge and itching.   Respiratory:  Negative for apnea, chest tightness and shortness of breath.    Cardiovascular:  Negative for chest pain, palpitations and leg swelling.   Gastrointestinal:  Negative for abdominal distention, abdominal pain, blood in stool, nausea and vomiting.   Endocrine: Negative for cold intolerance, heat intolerance, polydipsia, polyphagia and polyuria.   Genitourinary:  Negative for difficulty urinating.   Musculoskeletal:  Negative for arthralgias.   Skin:  Negative for color change and wound.   Neurological:  Negative for dizziness, syncope, speech difficulty and headaches.   Hematological:  Negative for adenopathy.   Psychiatric/Behavioral:  Negative for agitation and behavioral problems.        Objective   /66   Pulse 62   Temp 97.8 °F (36.6 °C)   Resp 16   Ht 5' 8\" (1.727 m)   Wt 74.8 kg (165 lb)   SpO2 96%   BMI 25.09 kg/m²      Diabetic Foot Exam    Patient's shoes and socks removed.    Right Foot/Ankle   Right Foot Inspection  Skin Exam: skin normal. Skin not intact, no dry skin, no warmth, no callus, no erythema, no maceration, no abnormal color, no pre-ulcer, no ulcer and no callus.     Toe Exam: ROM and strength within normal limits.     Sensory   Vibration: intact  Proprioception: intact  Monofilament testing: intact    Vascular  Capillary refills: < 3 seconds  The right DP pulse is 2+. The right PT pulse is 2+.     Left Foot/Ankle  Left Foot " Inspection  Skin Exam: skin normal. Skin not intact, no dry skin, no warmth, no erythema, no maceration, normal color, no pre-ulcer, no ulcer and no callus.     Toe Exam: ROM and strength within normal limits.     Sensory   Vibration: intact  Proprioception: intact  Monofilament testing: intact    Vascular  Capillary refills: < 3 seconds  The left DP pulse is 2+. The left PT pulse is 2+.     Assign Risk Category  No deformity present  No loss of protective sensation  No weak pulses  Risk: 0      Physical Exam  Vitals and nursing note reviewed.   Constitutional:       General: He is not in acute distress.     Appearance: He is well-developed. He is not diaphoretic.   HENT:      Head: Normocephalic and atraumatic.      Right Ear: External ear normal.      Left Ear: External ear normal.      Nose: Nose normal.      Mouth/Throat:      Pharynx: No oropharyngeal exudate.     Eyes:      General: No scleral icterus.        Right eye: No discharge.         Left eye: No discharge.      Pupils: Pupils are equal, round, and reactive to light.     Neck:      Thyroid: No thyromegaly.     Cardiovascular:      Rate and Rhythm: Normal rate.      Pulses: no weak pulses.           Dorsalis pedis pulses are 2+ on the right side and 2+ on the left side.        Posterior tibial pulses are 2+ on the right side and 2+ on the left side.      Heart sounds: Normal heart sounds. No murmur heard.  Pulmonary:      Effort: Pulmonary effort is normal. No respiratory distress.      Breath sounds: Normal breath sounds. No wheezing.   Abdominal:      General: Bowel sounds are normal. There is no distension.      Palpations: Abdomen is soft. There is no mass.      Tenderness: There is no abdominal tenderness. There is no guarding or rebound.     Musculoskeletal:         General: Normal range of motion.   Feet:      Right foot:      Skin integrity: No ulcer, skin breakdown, erythema, warmth, callus or dry skin.      Left foot:      Skin integrity: No  ulcer, skin breakdown, erythema, warmth, callus or dry skin.     Skin:     General: Skin is warm and dry.      Findings: No erythema or rash.     Neurological:      Mental Status: He is alert.      Coordination: Coordination normal.      Deep Tendon Reflexes: Reflexes normal.     Psychiatric:         Behavior: Behavior normal.

## 2025-06-23 NOTE — TELEPHONE ENCOUNTER
Empagliflozin (JARDIANCE) 10 MG TABS tablet     Patient's wife is asking if medication and 90 days supply can be sent to [facility] instead of [facility].    Thank you

## 2025-07-02 ENCOUNTER — APPOINTMENT (EMERGENCY)
Dept: RADIOLOGY | Facility: HOSPITAL | Age: 70
DRG: 684 | End: 2025-07-02
Payer: MEDICARE

## 2025-07-02 ENCOUNTER — HOSPITAL ENCOUNTER (INPATIENT)
Facility: HOSPITAL | Age: 70
LOS: 1 days | Discharge: HOME | DRG: 684 | End: 2025-07-04
Attending: EMERGENCY MEDICINE | Admitting: STUDENT IN AN ORGANIZED HEALTH CARE EDUCATION/TRAINING PROGRAM
Payer: MEDICARE

## 2025-07-02 DIAGNOSIS — N17.9 AKI (ACUTE KIDNEY INJURY) (HCC): Primary | ICD-10-CM

## 2025-07-02 DIAGNOSIS — R00.2 PALPITATIONS: ICD-10-CM

## 2025-07-02 PROBLEM — R53.1 GENERALIZED WEAKNESS: Status: ACTIVE | Noted: 2025-07-02

## 2025-07-02 PROBLEM — K03.6 DEPOSITS (ACCRETIONS) ON TEETH: Status: ACTIVE | Noted: 2025-07-02

## 2025-07-02 PROBLEM — B37.0 ORAL THRUSH: Status: ACTIVE | Noted: 2023-11-21

## 2025-07-02 PROBLEM — K03.81 CRACKED TOOTH: Status: ACTIVE | Noted: 2025-07-02

## 2025-07-02 PROBLEM — K02.62 DENTAL CARIES ON SMOOTH SURFACE PENETRATING INTO DENTIN: Status: ACTIVE | Noted: 2025-07-02

## 2025-07-02 PROBLEM — H04.129 DRY EYE SYNDROME OF UNSPECIFIED LACRIMAL GLAND: Status: ACTIVE | Noted: 2024-03-13

## 2025-07-02 PROBLEM — I10 BENIGN ESSENTIAL HYPERTENSION: Status: ACTIVE | Noted: 2019-05-06

## 2025-07-02 PROBLEM — K76.9 LIVER LESION, RIGHT LOBE: Status: ACTIVE | Noted: 2020-08-05

## 2025-07-02 PROBLEM — K02.52 DENTAL CARIES ON PIT AND FISSURE SURFACE PENETRATING INTO DENTIN: Status: ACTIVE | Noted: 2025-07-02

## 2025-07-02 PROBLEM — H90.3 SENSORINEURAL HEARING LOSS, BILATERAL: Status: ACTIVE | Noted: 2025-07-02

## 2025-07-02 PROBLEM — K04.02: Status: ACTIVE | Noted: 2023-11-21

## 2025-07-02 PROBLEM — I49.9 CARDIAC ARRHYTHMIA: Status: ACTIVE | Noted: 2023-08-15

## 2025-07-02 PROBLEM — Z77.29 EXPOSURE TO POTENTIALLY HAZARDOUS SUBSTANCE: Status: ACTIVE | Noted: 2025-07-02

## 2025-07-02 PROBLEM — K04.1: Status: ACTIVE | Noted: 2025-07-02

## 2025-07-02 PROBLEM — Z04.9 OBSERVATION FOR SUSPECTED CONDITION: Status: ACTIVE | Noted: 2020-05-04

## 2025-07-02 PROBLEM — J45.901 PERSISTENT ASTHMA WITH ACUTE EXACERBATION: Status: ACTIVE | Noted: 2019-05-06

## 2025-07-02 LAB
2HR DELTA HS TROPONIN: -1 NG/L
ALBUMIN SERPL BCG-MCNC: 4.3 G/DL (ref 3.5–5)
ALP SERPL-CCNC: 80 U/L (ref 34–104)
ALT SERPL W P-5'-P-CCNC: 20 U/L (ref 7–52)
ANION GAP SERPL CALCULATED.3IONS-SCNC: 12 MMOL/L (ref 4–13)
APTT PPP: 30 SECONDS (ref 23–34)
AST SERPL W P-5'-P-CCNC: 19 U/L (ref 13–39)
BACTERIA UR QL AUTO: ABNORMAL /HPF
BASOPHILS # BLD AUTO: 0.05 THOUSANDS/ÂΜL (ref 0–0.1)
BASOPHILS NFR BLD AUTO: 0 % (ref 0–1)
BILIRUB SERPL-MCNC: 0.65 MG/DL (ref 0.2–1)
BILIRUB UR QL STRIP: NEGATIVE
BNP SERPL-MCNC: 55 PG/ML (ref 0–100)
BUN SERPL-MCNC: 47 MG/DL (ref 5–25)
CALCIUM SERPL-MCNC: 9.8 MG/DL (ref 8.4–10.2)
CAOX CRY URNS QL MICRO: ABNORMAL /HPF
CARDIAC TROPONIN I PNL SERPL HS: 8 NG/L (ref ?–50)
CARDIAC TROPONIN I PNL SERPL HS: 9 NG/L (ref ?–50)
CHLORIDE SERPL-SCNC: 103 MMOL/L (ref 96–108)
CLARITY UR: CLEAR
CO2 SERPL-SCNC: 22 MMOL/L (ref 21–32)
COLOR UR: ABNORMAL
CREAT SERPL-MCNC: 2.17 MG/DL (ref 0.6–1.3)
EOSINOPHIL # BLD AUTO: 0.1 THOUSAND/ÂΜL (ref 0–0.61)
EOSINOPHIL NFR BLD AUTO: 1 % (ref 0–6)
ERYTHROCYTE [DISTWIDTH] IN BLOOD BY AUTOMATED COUNT: 15.4 % (ref 11.6–15.1)
FLUAV RNA RESP QL NAA+PROBE: NEGATIVE
FLUBV RNA RESP QL NAA+PROBE: NEGATIVE
GFR SERPL CREATININE-BSD FRML MDRD: 29 ML/MIN/1.73SQ M
GLUCOSE SERPL-MCNC: 94 MG/DL (ref 65–140)
GLUCOSE UR STRIP-MCNC: ABNORMAL MG/DL
HCT VFR BLD AUTO: 44.1 % (ref 36.5–49.3)
HGB BLD-MCNC: 14.3 G/DL (ref 12–17)
HGB UR QL STRIP.AUTO: NEGATIVE
HYALINE CASTS #/AREA URNS LPF: ABNORMAL /LPF
IMM GRANULOCYTES # BLD AUTO: 0.04 THOUSAND/UL (ref 0–0.2)
IMM GRANULOCYTES NFR BLD AUTO: 0 % (ref 0–2)
INR PPP: 1.02 (ref 0.85–1.19)
KETONES UR STRIP-MCNC: NEGATIVE MG/DL
LEUKOCYTE ESTERASE UR QL STRIP: ABNORMAL
LYMPHOCYTES # BLD AUTO: 1.3 THOUSANDS/ÂΜL (ref 0.6–4.47)
LYMPHOCYTES NFR BLD AUTO: 11 % (ref 14–44)
MAGNESIUM SERPL-MCNC: 2.2 MG/DL (ref 1.9–2.7)
MCH RBC QN AUTO: 25.5 PG (ref 26.8–34.3)
MCHC RBC AUTO-ENTMCNC: 32.4 G/DL (ref 31.4–37.4)
MCV RBC AUTO: 79 FL (ref 82–98)
MONOCYTES # BLD AUTO: 1.08 THOUSAND/ÂΜL (ref 0.17–1.22)
MONOCYTES NFR BLD AUTO: 9 % (ref 4–12)
MUCOUS THREADS UR QL AUTO: ABNORMAL
NEUTROPHILS # BLD AUTO: 9.33 THOUSANDS/ÂΜL (ref 1.85–7.62)
NEUTS SEG NFR BLD AUTO: 79 % (ref 43–75)
NITRITE UR QL STRIP: NEGATIVE
NON-SQ EPI CELLS URNS QL MICRO: ABNORMAL /HPF
NRBC BLD AUTO-RTO: 0 /100 WBCS
OTHER STN SPEC: ABNORMAL
PH UR STRIP.AUTO: 5.5 [PH]
PLATELET # BLD AUTO: 393 THOUSANDS/UL (ref 149–390)
PMV BLD AUTO: 9.7 FL (ref 8.9–12.7)
POTASSIUM SERPL-SCNC: 3.9 MMOL/L (ref 3.5–5.3)
PROT SERPL-MCNC: 8.1 G/DL (ref 6.4–8.4)
PROT UR STRIP-MCNC: ABNORMAL MG/DL
PROTHROMBIN TIME: 13.9 SECONDS (ref 12.3–15)
RBC # BLD AUTO: 5.6 MILLION/UL (ref 3.88–5.62)
RBC #/AREA URNS AUTO: ABNORMAL /HPF
RSV RNA RESP QL NAA+PROBE: NEGATIVE
SARS-COV-2 RNA RESP QL NAA+PROBE: NEGATIVE
SODIUM SERPL-SCNC: 137 MMOL/L (ref 135–147)
SP GR UR STRIP.AUTO: >=1.03 (ref 1–1.03)
UROBILINOGEN UR STRIP-ACNC: <2 MG/DL
WBC # BLD AUTO: 11.9 THOUSAND/UL (ref 4.31–10.16)
WBC #/AREA URNS AUTO: ABNORMAL /HPF

## 2025-07-02 PROCEDURE — 85730 THROMBOPLASTIN TIME PARTIAL: CPT | Performed by: EMERGENCY MEDICINE

## 2025-07-02 PROCEDURE — 93005 ELECTROCARDIOGRAM TRACING: CPT

## 2025-07-02 PROCEDURE — 0241U HB NFCT DS VIR RESP RNA 4 TRGT: CPT

## 2025-07-02 PROCEDURE — 96360 HYDRATION IV INFUSION INIT: CPT

## 2025-07-02 PROCEDURE — 99223 1ST HOSP IP/OBS HIGH 75: CPT

## 2025-07-02 PROCEDURE — 85025 COMPLETE CBC W/AUTO DIFF WBC: CPT | Performed by: EMERGENCY MEDICINE

## 2025-07-02 PROCEDURE — 80053 COMPREHEN METABOLIC PANEL: CPT | Performed by: EMERGENCY MEDICINE

## 2025-07-02 PROCEDURE — 84484 ASSAY OF TROPONIN QUANT: CPT | Performed by: EMERGENCY MEDICINE

## 2025-07-02 PROCEDURE — 83735 ASSAY OF MAGNESIUM: CPT | Performed by: EMERGENCY MEDICINE

## 2025-07-02 PROCEDURE — 81001 URINALYSIS AUTO W/SCOPE: CPT | Performed by: EMERGENCY MEDICINE

## 2025-07-02 PROCEDURE — 99285 EMERGENCY DEPT VISIT HI MDM: CPT

## 2025-07-02 PROCEDURE — 85610 PROTHROMBIN TIME: CPT | Performed by: EMERGENCY MEDICINE

## 2025-07-02 PROCEDURE — 99285 EMERGENCY DEPT VISIT HI MDM: CPT | Performed by: EMERGENCY MEDICINE

## 2025-07-02 PROCEDURE — 71045 X-RAY EXAM CHEST 1 VIEW: CPT

## 2025-07-02 PROCEDURE — 36415 COLL VENOUS BLD VENIPUNCTURE: CPT | Performed by: EMERGENCY MEDICINE

## 2025-07-02 PROCEDURE — 83880 ASSAY OF NATRIURETIC PEPTIDE: CPT | Performed by: EMERGENCY MEDICINE

## 2025-07-02 RX ORDER — HEPARIN SODIUM 5000 [USP'U]/ML
5000 INJECTION, SOLUTION INTRAVENOUS; SUBCUTANEOUS EVERY 8 HOURS SCHEDULED
Status: DISCONTINUED | OUTPATIENT
Start: 2025-07-03 | End: 2025-07-04 | Stop reason: HOSPADM

## 2025-07-02 RX ORDER — EZETIMIBE 10 MG/1
10 TABLET ORAL DAILY
Status: DISCONTINUED | OUTPATIENT
Start: 2025-07-03 | End: 2025-07-04 | Stop reason: HOSPADM

## 2025-07-02 RX ORDER — ASPIRIN 81 MG/1
81 TABLET, CHEWABLE ORAL DAILY
Status: DISCONTINUED | OUTPATIENT
Start: 2025-07-03 | End: 2025-07-04 | Stop reason: HOSPADM

## 2025-07-02 RX ORDER — HYDROCHLOROTHIAZIDE 12.5 MG/1
12.5 TABLET ORAL DAILY
Status: DISCONTINUED | OUTPATIENT
Start: 2025-07-03 | End: 2025-07-04 | Stop reason: HOSPADM

## 2025-07-02 RX ORDER — ATORVASTATIN CALCIUM 80 MG/1
80 TABLET, FILM COATED ORAL DAILY
Status: DISCONTINUED | OUTPATIENT
Start: 2025-07-03 | End: 2025-07-04 | Stop reason: HOSPADM

## 2025-07-02 RX ORDER — SODIUM CHLORIDE 9 MG/ML
75 INJECTION, SOLUTION INTRAVENOUS CONTINUOUS
Status: DISCONTINUED | OUTPATIENT
Start: 2025-07-02 | End: 2025-07-04

## 2025-07-02 RX ORDER — LOSARTAN POTASSIUM 50 MG/1
100 TABLET ORAL DAILY
Status: DISCONTINUED | OUTPATIENT
Start: 2025-07-03 | End: 2025-07-04 | Stop reason: HOSPADM

## 2025-07-02 RX ORDER — ENOXAPARIN SODIUM 100 MG/ML
40 INJECTION SUBCUTANEOUS DAILY
Status: DISCONTINUED | OUTPATIENT
Start: 2025-07-03 | End: 2025-07-02

## 2025-07-02 RX ORDER — MAGNESIUM HYDROXIDE/ALUMINUM HYDROXICE/SIMETHICONE 120; 1200; 1200 MG/30ML; MG/30ML; MG/30ML
30 SUSPENSION ORAL EVERY 6 HOURS PRN
Status: DISCONTINUED | OUTPATIENT
Start: 2025-07-02 | End: 2025-07-04 | Stop reason: HOSPADM

## 2025-07-02 RX ORDER — POLYETHYLENE GLYCOL 3350 17 G/17G
17 POWDER, FOR SOLUTION ORAL DAILY PRN
Status: DISCONTINUED | OUTPATIENT
Start: 2025-07-02 | End: 2025-07-04 | Stop reason: HOSPADM

## 2025-07-02 RX ORDER — METOPROLOL SUCCINATE 50 MG/1
50 TABLET, EXTENDED RELEASE ORAL EVERY MORNING
Status: DISCONTINUED | OUTPATIENT
Start: 2025-07-03 | End: 2025-07-02

## 2025-07-02 RX ORDER — ONDANSETRON 2 MG/ML
4 INJECTION INTRAMUSCULAR; INTRAVENOUS EVERY 6 HOURS PRN
Status: DISCONTINUED | OUTPATIENT
Start: 2025-07-02 | End: 2025-07-04 | Stop reason: HOSPADM

## 2025-07-02 RX ORDER — ACETAMINOPHEN 325 MG/1
975 TABLET ORAL EVERY 6 HOURS PRN
Status: DISCONTINUED | OUTPATIENT
Start: 2025-07-02 | End: 2025-07-04 | Stop reason: HOSPADM

## 2025-07-02 RX ORDER — FLUTICASONE PROPIONATE 50 MCG
1 SPRAY, SUSPENSION (ML) NASAL DAILY
Status: DISCONTINUED | OUTPATIENT
Start: 2025-07-03 | End: 2025-07-04 | Stop reason: HOSPADM

## 2025-07-02 RX ORDER — MONTELUKAST SODIUM 10 MG/1
10 TABLET ORAL DAILY
Status: DISCONTINUED | OUTPATIENT
Start: 2025-07-03 | End: 2025-07-04 | Stop reason: HOSPADM

## 2025-07-02 RX ORDER — AMLODIPINE BESYLATE 10 MG/1
10 TABLET ORAL EVERY EVENING
Status: DISCONTINUED | OUTPATIENT
Start: 2025-07-02 | End: 2025-07-04 | Stop reason: HOSPADM

## 2025-07-02 RX ORDER — BUPROPION HYDROCHLORIDE 150 MG/1
150 TABLET ORAL DAILY
Status: DISCONTINUED | OUTPATIENT
Start: 2025-07-03 | End: 2025-07-04 | Stop reason: HOSPADM

## 2025-07-02 RX ADMIN — SODIUM CHLORIDE 500 ML: 0.9 INJECTION, SOLUTION INTRAVENOUS at 22:47

## 2025-07-02 RX ADMIN — AMLODIPINE BESYLATE 10 MG: 10 TABLET ORAL at 22:50

## 2025-07-02 RX ADMIN — SODIUM CHLORIDE 150 ML/HR: 0.9 INJECTION, SOLUTION INTRAVENOUS at 19:03

## 2025-07-02 NOTE — H&P
H&P - Hospitalist   Name: Robin Stephens 69 y.o. male I MRN: [id number]  Unit/Bed#: [address] [place] [address] Date of Admission: 7/2/2025   Date of Service: 7/2/2025 I Hospital Day: 0     Assessment & Plan  EMILY (acute kidney injury) (HCC)   Latest Reference Range & Units 07/02/25 19:02   BUN 5 - 25 mg/dL 47 (H)   Creatinine 0.60 - 1.30 mg/dL 2.17 (H)     Reports eating and drinking, reports the new use of Jardiance that could be causing EMILY  NS x 1 bolus, continue with hydration on the floors  Etiology of EMILY could be due to the use of SGLT 2  Monitor kidney function at midnight and then 6 AM  Hold Jardiance, ARB's and thiazide diuretics  Avoid nephrotoxic medications  Monitor kidney function with daily BMPs  Generalized weakness  Reports feeling unwell for a few days, reports palpitation and shortness of breath  EKG on arrival shows normal sinus rhythm heart rate in the 70s  On assessment he is euvolemic, there is no edema, O2 sats greater than 95% on room air, he is in no acute distress  Etiology of generalized weakness is unclear however could be related to a viral respiratory infection  Continue close monitoring  Coronary artery disease  Denies any chest pain, reports short of breath at times, O2 sats in the ER greater than 95% on room air, he is not in acute distress  EKG on admission shows normal sinus rhythm, heart rate in the 70s there is no ST wave abnormalities  History of CABG x 2 in 2007  On beta-blocker, aspirin and statin daily, continue  ICD-[employer] in place, last interrogation 4/30/2025 with normal device function at [facility] system  Follows up closely with [facility] cardiology, last visit 6/20/2025  Cardiac sarcoidosis  Last MICHAEL in 2022 shows LVEF 60%  Spouse and patient reports a recent echocardiogram in [place], report unavailable  Last PET scan and cardiac MRI consistent with cardiac sarcoidosis  He does follow out as an outpatient with [facility] cardiology  TIA (transient ischemic  attack)  History of TIA in 2012, denies any residual, any acute symptoms  Neurological function is intact  Continue with aspirin and statin daily  HTN (hypertension)  BP stable on presentation  Patient is on hydrochlorothiazide, losartan, amlodipine, beta-blocker and Lasix 20 mg as needed  Will hold ARB and hydrochlorothiazide due to EMILY  We will add Lasix 20 mg if there is any concern for volume overload  Monitor BP per unit protocol when needed  Asthma, moderate persistent  Does not appear to be on an exacerbation  Continue with Singulair  Prediabetes  Last hemoglobin A1c at 6.9 he was started on Jardiance since he is at high risk of cardiac complications and failed lifestyle/dietary modifications  Blood glucose on admission 94  Jardiance on hold for now  Consider metformin at discharge      VTE Pharmacologic Prophylaxis: VTE Score: 3 Moderate Risk (Score 3-4) - Pharmacological DVT Prophylaxis Ordered: heparin.  Code Status: Level 1 - Full Code   Discussion with family: Updated  (wife) at bedside.    Anticipated Length of Stay: Patient will be admitted on an observation basis with an anticipated length of stay of less than 2 midnights secondary to close monitoring of EMILY, fluid resuscitation, serial BMPs, possible nephrology consult..    History of Present Illness   Chief Complaint:   Chief Complaint   Patient presents with    Palpitations     C/o numerous symptoms for about a week, palpitations, shortness of breath, chest tightness         Robin Stephens is a 69 y.o. male with a PMH of CAD, A1c at 6.9, cardiac sarcoidosis, s/p ICD placement, atrial tachycardia s/p ablation, hypertension, PTSD who presents with a few days of generalized weakness, palpitations and shortness of breath.  Patient reports he has been feeling unwell for the last couple of days reports drinking and eating the same, reports PCP started him on Jardiance a few days ago for diabetes.  In the ER x-ray is negative for any acute  cardiopulmonary process, final report is pending, blood work shows EMILY, he is hemodynamically stable, in no acute distress, in room air.  Patient has been admitted under observation for further management and treatment.    Review of Systems   Constitutional:  Negative for chills and fever.   HENT:  Negative for ear pain and sore throat.    Eyes:  Negative for pain and visual disturbance.   Respiratory:  Positive for shortness of breath. Negative for cough.    Cardiovascular:  Positive for palpitations. Negative for chest pain.   Gastrointestinal:  Negative for abdominal pain and vomiting.   Genitourinary:  Negative for dysuria and hematuria.   Musculoskeletal:  Negative for arthralgias and back pain.   Skin:  Negative for color change and rash.   Neurological:  Positive for weakness. Negative for seizures and syncope.   All other systems reviewed and are negative.      Historical Information   Past Medical History[1]  Past Surgical History[2]  Social History[3]  E-Cigarette/Vaping    E-Cigarette Use Never User      E-Cigarette/Vaping Substances    Nicotine No     THC No     CBD No     Flavoring No     Other No     Unknown No      Family History[4]  Social History:  Marital Status: /Civil Union   Occupation:   Patient Pre-hospital Living Situation: Home  Patient Pre-hospital Level of Mobility: walks  Patient Pre-hospital Diet Restrictions: None    Meds/Allergies   I have reviewed home medications with patient personally.  Prior to Admission medications    Medication Sig Start Date End Date Taking? Authorizing Provider   amLODIPine (NORVASC) 10 mg tablet Take 10 mg by mouth every evening    Historical Provider, MD   aspirin (ECOTRIN LOW STRENGTH) 81 mg EC tablet  8/31/21   Historical Provider, MD   atorvastatin (LIPITOR) 80 mg tablet Take 1 tablet (80 mg total) by mouth in the morning 10/13/23   Martin Glasgow MD   budesonide-formoterol (SYMBICORT) 160-4.5 mcg/act inhaler Inhale 2 puffs 2 (two) times a  day  Patient not taking: Reported on 4/16/2025 11/21/23 3/18/25  Historical Provider, MD   buPROPion (WELLBUTRIN XL) 150 mg 24 hr tablet Take 150 mg by mouth    Patient not taking: Reported on 4/16/2025    Historical Provider, MD   CARBOXYMETHYLCELLULOSE SODIUM OP Apply to eye 1/15/25   Historical Provider, MD   Cholecalciferol 50 MCG (2000 UT) TABS Take 50 mcg by mouth in the morning. 11/7/24   Historical Provider, MD   clotrimazole-betamethasone (LOTRISONE) 1-0.05 % cream Apply topically 2 (two) times a day To buttocks 3/18/25   Frank Lombardi, DO   cyclobenzaprine (FLEXERIL) 10 mg tablet Take 10 mg by mouth daily as needed 11/12/21   Historical Provider, MD   Elastic Bandages & Supports ([employer] Lumbar/Back Support Brace) MISC Use daily 8/19/24 8/19/25  Frank Lombardi, DO   Empagliflozin (JARDIANCE) 10 MG TABS tablet Take 1 tablet (10 mg total) by mouth daily 6/23/25 6/18/26  Frank Lombardi, DO   eplerenone (INSPRA) 50 MG tablet Take 50 mg by mouth in the morning.    Historical Provider, MD   Evolocumab (Repatha SureClick) 140 MG/ML SOAJ Take one injection every two weeks 6/20/25   Martin Glasgow MD   ezetimibe (ZETIA) 10 mg tablet Take 1 tablet (10 mg total) by mouth daily 3/18/25 3/18/26  Frank Lombardi, DO   fluticasone (FLONASE) 50 mcg/act nasal spray as needed 3/23/23   Historical Provider, MD   folic acid (FOLVITE) 1 mg tablet 1 mg 9/15/22   Historical Provider, MD   furosemide (LASIX) 20 mg tablet if needed 3/6/23   Historical Provider, MD   hydrochlorothiazide (HYDRODIURIL) 25 mg tablet Take 12.5 mg by mouth in the morning.    Historical Provider, MD   hydrocortisone 2.5 % cream  12/9/24   Historical Provider, MD   ibuprofen (MOTRIN) 600 mg tablet Take 600 mg by mouth every 6 (six) hours as needed for moderate pain 7/9/24   Historical Provider, MD   lidocaine (LIDODERM) 5 %  8/31/21   Historical Provider, MD   losartan (COZAAR) 100 MG tablet Take 100 mg by mouth in the morning. 6/22/24   Historical Provider, MD    melatonin 3 mg Take 3 mg by mouth    Historical Provider, MD   metoprolol succinate (TOPROL-XL) 25 mg 24 hr tablet Take 1 tablet (25 mg total) by mouth every morning 7/30/24   Martin Glasgow MD   metoprolol succinate (TOPROL-XL) 50 mg 24 hr tablet Take 1 tablet (50 mg total) by mouth every morning 7/30/24   Martin Glasgow MD   mometasone (ELOCON) 0.1 % cream Apply topically daily To wrist 3/18/25   Frank Lombardi, DO   montelukast (SINGULAIR) 10 mg tablet Take 10 mg by mouth    Historical Provider, MD   Multiple Vitamin (MULTIVITAMIN PO)  11/13/23   Historical Provider, MD   omeprazole (PriLOSEC) 20 mg delayed release capsule take 1 capsule by mouth once daily 12/22/22   Agapito Kelly MD   polyvinyl alcohol (LIQUIFILM TEARS) 1.4 % ophthalmic solution Administer 1 drop to both eyes in the morning and 1 drop at noon and 1 drop in the evening and 1 drop before bedtime. 5/9/24 6/23/25  Historical Provider, MD   sodium chloride (OCEAN) 0.65 % nasal spray 1 spray into each nostril as needed in the morning and 1 spray as needed at noon and 1 spray as needed in the evening and 1 spray as needed before bedtime. 12/9/22   Historical Provider, MD   venlafaxine (EFFEXOR-XR) 75 mg 24 hr capsule Take 75 mg by mouth 5/22/25   Historical Provider, MD     Allergies   Allergen Reactions    Bee Pollen Other (See Comments)     Itchy eyes/nose, sneezing, clogged ears, increased cough/chest tightness.    Methotrexate Other (See Comments)     Felt unwell, unsteady    Moxifloxacin GI Intolerance     W/ GI bleed      Pollen Extract Other (See Comments)     Itchy eyes/nose, sneezing, clogged ears, increased cough/chest tightness.    Lisinopril Cough       Objective :  Temp:  [97.5 °F (36.4 °C)-98.8 °F (37.1 °C)] 98.8 °F (37.1 °C)  HR:  [68-91] 68  BP: (123-160)/(60-85) 160/85  Resp:  [12-20] 12  SpO2:  [92 %-97 %] 92 %  O2 Device: None (Room air)    Physical Exam  Vitals and nursing note reviewed.   Constitutional:       General: He is  not in acute distress.     Appearance: Normal appearance. He is well-developed.   HENT:      Head: Normocephalic and atraumatic.     Eyes:      Conjunctiva/sclera: Conjunctivae normal.       Cardiovascular:      Rate and Rhythm: Normal rate and regular rhythm.      Pulses: Normal pulses.      Heart sounds: Normal heart sounds. No murmur heard.  Pulmonary:      Effort: Pulmonary effort is normal. No respiratory distress.      Breath sounds: Normal breath sounds.   Abdominal:      Palpations: Abdomen is soft.      Tenderness: There is no abdominal tenderness.     Musculoskeletal:         General: No swelling.      Cervical back: Neck supple.     Skin:     General: Skin is warm and dry.      Capillary Refill: Capillary refill takes less than 2 seconds.     Neurological:      Mental Status: He is alert. Mental status is at baseline.     Psychiatric:         Mood and Affect: Mood normal.          Lines/Drains:            Lab Results: I have reviewed the following results:  Results from last 7 days   Lab Units 07/02/25  1902   WBC Thousand/uL 11.90*   HEMOGLOBIN g/dL 14.3   HEMATOCRIT % 44.1   PLATELETS Thousands/uL 393*   SEGS PCT % 79*   LYMPHO PCT % 11*   MONO PCT % 9   EOS PCT % 1     Results from last 7 days   Lab Units 07/02/25  1902   SODIUM mmol/L 137   POTASSIUM mmol/L 3.9   CHLORIDE mmol/L 103   CO2 mmol/L 22   BUN mg/dL 47*   CREATININE mg/dL 2.17*   ANION GAP mmol/L 12   CALCIUM mg/dL 9.8   ALBUMIN g/dL 4.3   TOTAL BILIRUBIN mg/dL 0.65   ALK PHOS U/L 80   ALT U/L 20   AST U/L 19   GLUCOSE RANDOM mg/dL 94     Results from last 7 days   Lab Units 07/02/25  1902   INR  1.02         Lab Results   Component Value Date    HGBA1C 6.9 (H) 06/17/2025    HGBA1C 6.5 (H) 03/13/2025    HGBA1C 6.4 (H) 07/30/2024           Imaging Results Review: I personally reviewed the following image studies/reports in PACS and discussed pertinent findings with Radiology: chest xray. My interpretation of the radiology images/reports is:  Negative for any acute findings.  Other Study Results Review: EKG was reviewed.     Administrative Statements   I have spent a total time of 75 minutes in caring for this patient on the day of the visit/encounter including Diagnostic results, Prognosis, Risks and benefits of tx options, Instructions for management, Patient and family education, Importance of tx compliance, Risk factor reductions, Impressions, Counseling / Coordination of care, Documenting in the medical record, Reviewing/placing orders in the medical record (including tests, medications, and/or procedures), Obtaining or reviewing history  , and Communicating with other healthcare professionals .    ** Please Note: This note has been constructed using a voice recognition system. **         [1]   Past Medical History:  Diagnosis Date    Alcohol dependence, uncomplicated (HCC) 10/28/2022    Coronary artery disease     Hyperlipemia     Hypertension     Irregular heart beat     bradycardia   [2]   Past Surgical History:  Procedure Laterality Date    ANTERIOR CRUCIATE LIGAMENT REPAIR Left 2001    CARDIAC DEFIBRILLATOR PLACEMENT      CARDIAC LOOP RECORDER  03/2022    MD from [facility] possible pacer placement    CARDIAC PACEMAKER PLACEMENT      CARDIAC SURGERY  2007    CABG x2   [3]   Social History  Tobacco Use    Smoking status: Former     Current packs/day: 0.25     Average packs/day: 0.3 packs/day for 56.5 years (14.1 ttl pk-yrs)     Types: Cigarettes     Start date: 1969     Passive exposure: Past    Smokeless tobacco: Never   Vaping Use    Vaping status: Never Used   Substance and Sexual Activity    Alcohol use: Never    Drug use: Never   [4]   Family History  Problem Relation Name Age of Onset    Arthritis Mother      Leukemia Maternal Aunt      Mental illness Neg Hx

## 2025-07-02 NOTE — ED PROVIDER NOTES
Time reflects when diagnosis was documented in both MDM as applicable and the Disposition within this note       Time User Action Codes Description Comment    7/2/2025  7:55 PM Frederick Peraza Add [N17.9] EMILY (acute kidney injury) (HCC)     7/2/2025  7:55 PM Frederick Peraza Add [R00.2] Palpitations           ED Disposition       ED Disposition   Admit    Condition   Stable    Date/Time   Wed Jul 2, 2025  7:55 PM    Comment   Case was discussed with hospitalist and the patient's admission status was agreed to be Admission Status: observation status to the service of Dr. Crenshaw.               Assessment & Plan       Medical Decision Making  Patient significant cardiac history.  He also has had a recent ablation this year.  States has been having worsening dyspnea on exertion with early fatigability ongoing for approximately a week.  Had nonsustained episodes of chest tightness earlier    Amount and/or Complexity of Data Reviewed  Labs: ordered.  Radiology: ordered.    Risk  Prescription drug management.  Decision regarding hospitalization.             Medications   sodium chloride 0.9 % infusion (150 mL/hr Intravenous New Bag 7/2/25 7920)       ED Risk Strat Scores                    (ISAR) Identification of Seniors at Risk  Before the illness or injury that brought you to the Emergency, did you need someone to help you on a regular basis?: 0  In the last 24 hours, have you needed more help than usual?: 0  Have you been hospitalized for one or more nights during the past 6 months?: 0  In general, do you see well?: 1  In general, do you have serious problems with your memory?: 1  Do you take more than three different medications every day?: 1  ISAR Score: 3                                History of Present Illness       Chief Complaint   Patient presents with    Palpitations     C/o numerous symptoms for about a week, palpitations, shortness of breath, chest tightness       Past Medical History[1]   Past Surgical  History[2]   Family History[3]   Social History[4]   E-Cigarette/Vaping    E-Cigarette Use Never User       E-Cigarette/Vaping Substances    Nicotine No     THC No     CBD No     Flavoring No     Other No     Unknown No       I have reviewed and agree with the history as documented.     Patient significant cardiac history including ICD.  He underwent a cardiac ablation for A-fib this year.  Patient states that for about the last week or so he has been experiencing exertional dyspnea shortness of breath with increased fatigability.  He has had nonsustained episodes of chest tightness that he would not really call pain which come and go without treatment.  He did have some recent increase swelling in both lower extremities and self-administered Lasix for a few doses with improvement.  No fever        Review of Systems   Constitutional:  Negative for chills and fever.   HENT:  Negative for congestion and sore throat.    Eyes:  Negative for visual disturbance.   Respiratory:  Positive for chest tightness and shortness of breath. Negative for cough.    Cardiovascular:  Positive for palpitations and leg swelling. Negative for chest pain.   Gastrointestinal:  Negative for abdominal pain and vomiting.   Genitourinary:  Negative for decreased urine volume, difficulty urinating and dysuria.   Musculoskeletal:  Negative for arthralgias and back pain.   Skin:  Negative for rash.   Neurological:  Positive for weakness. Negative for syncope and headaches.   Hematological:  Negative for adenopathy.   Psychiatric/Behavioral:  Negative for behavioral problems and confusion.    All other systems reviewed and are negative.          Objective       ED Triage Vitals [07/02/25 1812]   Temperature Pulse Blood Pressure Respirations SpO2 Patient Position - Orthostatic VS   97.5 °F (36.4 °C) 91 123/76 20 97 % Sitting      Temp Source Heart Rate Source BP Location FiO2 (%) Pain Score    Tympanic Monitor Right arm -- 4      Vitals      Date  and Time Temp Pulse SpO2 Resp BP Pain Score FACES Pain Rating User   07/02/25 2108 98.8 °F (37.1 °C) 68 92 % 12 160/85 -- -- DII   07/02/25 2030 -- 68 96 % 20 135/72 -- -- SW   07/02/25 1930 -- 76 95 % 20 123/60 -- -- SW   07/02/25 1900 -- 79 96 % 20 138/73 -- -- CS   07/02/25 1812 97.5 °F (36.4 °C) 91 97 % 20 123/76 4 -- LS            Physical Exam  Vitals and nursing note reviewed.   Constitutional:       Appearance: Normal appearance.   HENT:      Head: Normocephalic.      Right Ear: External ear normal.      Left Ear: External ear normal.      Nose: Nose normal.      Mouth/Throat:      Pharynx: Oropharynx is clear.     Eyes:      Conjunctiva/sclera: Conjunctivae normal.       Cardiovascular:      Rate and Rhythm: Normal rate and regular rhythm.      Pulses: Normal pulses.      Heart sounds: Normal heart sounds.   Pulmonary:      Effort: Pulmonary effort is normal.      Breath sounds: Normal breath sounds.   Abdominal:      Palpations: Abdomen is soft.      Tenderness: There is no abdominal tenderness.     Musculoskeletal:         General: Normal range of motion.      Cervical back: Normal range of motion and neck supple.      Right lower leg: No edema.      Left lower leg: No edema.     Skin:     General: Skin is warm and dry.      Capillary Refill: Capillary refill takes less than 2 seconds.     Neurological:      General: No focal deficit present.      Mental Status: He is alert and oriented to person, place, and time.     Psychiatric:         Mood and Affect: Mood normal.         Behavior: Behavior normal.         Results Reviewed       Procedure Component Value Units Date/Time    FLU/RSV/COVID - if FLU/RSV clinically relevant [[id number]]  (Normal) Collected: 07/02/25 2028    Lab Status: Final result Specimen: Nares from Nose Updated: 07/02/25 2117     SARS-CoV-2 Negative     INFLUENZA A PCR Negative     INFLUENZA B PCR Negative     RSV PCR Negative    Narrative:      This test has been performed using the  CoV-2/Flu/RSV plus assay on the [employer] GeneXpert platform. This test has been validated by the  and verified by the performing laboratory.     This test is designed to amplify and detect the following: nucleocapsid (N), envelope (E), and RNA-dependent RNA polymerase (RdRP) genes of the SARS-CoV-2 genome; matrix (M), basic polymerase (PB2), and acidic protein (PA) segments of the influenza A genome; matrix (M) and non-structural protein (NS) segments of the influenza B genome, and the nucleocapsid genes of RSV A and RSV B.     Positive results are indicative of the presence of Flu A, Flu B, RSV, and/or SARS-CoV-2 RNA. Positive results for SARS-CoV-2 or suspected novel influenza should be reported to state, local, or federal health departments according to local reporting requirements.      All results should be assessed in conjunction with clinical presentation and other laboratory markers for clinical management.     FOR PEDIATRIC PATIENTS - copy/paste COVID Guidelines URL to browser: [email or web address]       HS Troponin I 2hr [[id number]]  (Normal) Collected: 07/02/25 2028    Lab Status: Final result Specimen: Blood from Arm, Left Updated: 07/02/25 2058     hs TnI 2hr 8 ng/L      Delta 2hr hsTnI -1 ng/L     Platelet count [[id number]]     Lab Status: No result Specimen: Blood     Urinalysis with microscopic [[id number]]     Lab Status: No result Specimen: Urine, Clean Catch     Urine Microscopic [[id number]]  (Abnormal) Collected: 07/02/25 1935    Lab Status: Final result Specimen: Urine, Clean Catch Updated: 07/02/25 1952     RBC, UA 0-1 /hpf      WBC, UA 1-2 /hpf      Epithelial Cells Occasional /hpf      Bacteria, UA Moderate /hpf      MUCUS THREADS Moderate     Hyaline Casts, UA 30-50 /lpf      Ca Oxalate Jordana, UA Occasional /hpf      OTHER OBSERVATIONS Sperm Present    UA (URINE) with reflex to Scope [[id number]]  (Abnormal) Collected: 07/02/25  1935    Lab Status: Final result Specimen: Urine, Clean Catch Updated: 07/02/25 1940     Color, UA Dark Yellow     Clarity, UA Clear     Specific Gravity, UA >=1.030     pH, UA 5.5     Leukocytes, UA Trace     Nitrite, UA Negative     Protein, UA 30 (1+) mg/dl      Glucose, UA 1000 (1%) mg/dl      Ketones, UA Negative mg/dl      Urobilinogen, UA <2.0 mg/dl      Bilirubin, UA Negative     Occult Blood, UA Negative    HS Troponin 0hr (reflex protocol) [[id number]]  (Normal) Collected: 07/02/25 1902    Lab Status: Final result Specimen: Blood from Arm, Left Updated: 07/02/25 1933     hs TnI 0hr 9 ng/L     B-Type Natriuretic Peptide(BNP) [[id number]]  (Normal) Collected: 07/02/25 1902    Lab Status: Final result Specimen: Blood from Arm, Left Updated: 07/02/25 1932     BNP 55 pg/mL     Magnesium [[id number]]  (Normal) Collected: 07/02/25 1902    Lab Status: Final result Specimen: Blood from Arm, Left Updated: 07/02/25 1927     Magnesium 2.2 mg/dL     Comprehensive metabolic panel [[id number]]  (Abnormal) Collected: 07/02/25 1902    Lab Status: Final result Specimen: Blood from Arm, Left Updated: 07/02/25 1927     Sodium 137 mmol/L      Potassium 3.9 mmol/L      Chloride 103 mmol/L      CO2 22 mmol/L      ANION GAP 12 mmol/L      BUN 47 mg/dL      Creatinine 2.17 mg/dL      Glucose 94 mg/dL      Calcium 9.8 mg/dL      AST 19 U/L      ALT 20 U/L      Alkaline Phosphatase 80 U/L      Total Protein 8.1 g/dL      Albumin 4.3 g/dL      Total Bilirubin 0.65 mg/dL      eGFR 29 ml/min/1.73sq m     Narrative:      National Kid[employer] guidelines for Chronic Kidney Disease (CKD):     Stage 1 with normal or high GFR (GFR > 90 mL/min/1.73 square meters)    Stage 2 Mild CKD (GFR = 60-89 mL/min/1.73 square meters)    Stage 3A Moderate CKD (GFR = 45-59 mL/min/1.73 square meters)    Stage 3B Moderate CKD (GFR = 30-44 mL/min/1.73 square meters)    Stage 4 Severe CKD (GFR = 15-29 mL/min/1.73 square meters)    Stage 5 End Stage  CKD (GFR <15 mL/min/1.73 square meters)  Note: GFR calculation is accurate only with a steady state creatinine    Protime-INR [[id number]]  (Normal) Collected: 07/02/25 1902    Lab Status: Final result Specimen: Blood from Arm, Left Updated: 07/02/25 1922     Protime 13.9 seconds      INR 1.02    Narrative:      INR Therapeutic Range    Indication                                             INR Range      Atrial Fibrillation                                               2.0-3.0  Hypercoagulable State                                    2.0.2.3  Left Ventricular Asist Device                            2.0-3.0  Mechanical Heart Valve                                  -    Aortic(with afib, MI, embolism, HF, LA enlargement,    and/or coagulopathy)                                     2.0-3.0 (2.5-3.5)     Mitral                                                             2.5-3.5  Prosthetic/Bioprosthetic Heart Valve               2.0-3.0  Venous thromboembolism (VTE: VT, PE        2.0-3.0    APTT [[id number]]  (Normal) Collected: 07/02/25 1902    Lab Status: Final result Specimen: Blood from Arm, Left Updated: 07/02/25 1922     PTT 30 seconds     CBC and differential [[id number]]  (Abnormal) Collected: 07/02/25 1902    Lab Status: Final result Specimen: Blood from Arm, Left Updated: 07/02/25 1910     WBC 11.90 Thousand/uL      RBC 5.60 Million/uL      Hemoglobin 14.3 g/dL      Hematocrit 44.1 %      MCV 79 fL      MCH 25.5 pg      MCHC 32.4 g/dL      RDW 15.4 %      MPV 9.7 fL      Platelets 393 Thousands/uL      nRBC 0 /100 WBCs      Segmented % 79 %      Immature Grans % 0 %      Lymphocytes % 11 %      Monocytes % 9 %      Eosinophils Relative 1 %      Basophils Relative 0 %      Absolute Neutrophils 9.33 Thousands/µL      Absolute Immature Grans 0.04 Thousand/uL      Absolute Lymphocytes 1.30 Thousands/µL      Absolute Monocytes 1.08 Thousand/µL      Eosinophils Absolute 0.10 Thousand/µL      Basophils Absolute 0.05  Thousands/µL             XR chest 1 view portable    (Results Pending)       ECG 12 Lead Documentation Only    Date/Time: 7/2/2025 7:00 PM    Performed by: Frederick Peraza MD  Authorized by: Frederick Peraza MD    Indications / Diagnosis:  Palpitations  ECG reviewed by me, the ED Provider: yes    Patient location:  ED  Interpretation:     Interpretation: abnormal    Rate:     ECG rate:  77    ECG rate assessment: normal    Rhythm:     Rhythm: sinus rhythm    Ectopy:     Ectopy: none    QRS:     QRS axis:  Left    QRS intervals:  Normal  Conduction:     Conduction: abnormal      Abnormal conduction: complete RBBB    ST segments:     ST segments:  Normal  T waves:     T waves: normal        ED Medication and Procedure Management   Prior to Admission Medications   Prescriptions Last Dose Informant Patient Reported? Taking?   CARBOXYMETHYLCELLULOSE SODIUM OP  Self Yes No   Sig: Apply to eye   Cholecalciferol 50 MCG (2000 UT) TABS  Self Yes No   Sig: Take 50 mcg by mouth in the morning.   Elastic Bandages & Supports ([employer] Lumbar/Back Support Brace) MISC  Self No No   Sig: Use daily   Empagliflozin (JARDIANCE) 10 MG TABS tablet   No No   Sig: Take 1 tablet (10 mg total) by mouth daily   Evolocumab (Repatha SureClick) 140 MG/ML SOAJ   No No   Sig: Take one injection every two weeks   Multiple Vitamin (MULTIVITAMIN PO)  Self Yes No   amLODIPine (NORVASC) 10 mg tablet  Self Yes No   Sig: Take 10 mg by mouth every evening   aspirin (ECOTRIN LOW STRENGTH) 81 mg EC tablet  Self Yes No   atorvastatin (LIPITOR) 80 mg tablet  Self No No   Sig: Take 1 tablet (80 mg total) by mouth in the morning   buPROPion (WELLBUTRIN XL) 150 mg 24 hr tablet  Self Yes No   Sig: Take 150 mg by mouth     Patient not taking: Reported on 4/16/2025   budesonide-formoterol (SYMBICORT) 160-4.5 mcg/act inhaler   Yes No   Sig: Inhale 2 puffs 2 (two) times a day   Patient not taking: Reported on 4/16/2025   clotrimazole-betamethasone (LOTRISONE) 1-0.05 %  cream  Self No No   Sig: Apply topically 2 (two) times a day To buttocks   cyclobenzaprine (FLEXERIL) 10 mg tablet  Self Yes No   Sig: Take 10 mg by mouth daily as needed   eplerenone (INSPRA) 50 MG tablet  Self Yes No   Sig: Take 50 mg by mouth in the morning.   ezetimibe (ZETIA) 10 mg tablet  Self No No   Sig: Take 1 tablet (10 mg total) by mouth daily   fluticasone (FLONASE) 50 mcg/act nasal spray  Self Yes No   Sig: as needed   folic acid (FOLVITE) 1 mg tablet  Self Yes No   Si mg   furosemide (LASIX) 20 mg tablet  Self Yes No   Sig: if needed   hydrochlorothiazide (HYDRODIURIL) 25 mg tablet  Self Yes No   Sig: Take 12.5 mg by mouth in the morning.   hydrocortisone 2.5 % cream  Self Yes No   ibuprofen (MOTRIN) 600 mg tablet  Self Yes No   Sig: Take 600 mg by mouth every 6 (six) hours as needed for moderate pain   lidocaine (LIDODERM) 5 %  Self Yes No   losartan (COZAAR) 100 MG tablet  Self Yes No   Sig: Take 100 mg by mouth in the morning.   melatonin 3 mg  Self Yes No   Sig: Take 3 mg by mouth   metoprolol succinate (TOPROL-XL) 25 mg 24 hr tablet  Self No No   Sig: Take 1 tablet (25 mg total) by mouth every morning   metoprolol succinate (TOPROL-XL) 50 mg 24 hr tablet  Self No No   Sig: Take 1 tablet (50 mg total) by mouth every morning   mometasone (ELOCON) 0.1 % cream  Self No No   Sig: Apply topically daily To wrist   montelukast (SINGULAIR) 10 mg tablet  Self Yes No   Sig: Take 10 mg by mouth   omeprazole (PriLOSEC) 20 mg delayed release capsule  Self No No   Sig: take 1 capsule by mouth once daily   polyvinyl alcohol (LIQUIFILM TEARS) 1.4 % ophthalmic solution   Yes No   Sig: Administer 1 drop to both eyes in the morning and 1 drop at noon and 1 drop in the evening and 1 drop before bedtime.   sodium chloride (OCEAN) 0.65 % nasal spray  Self Yes No   Si spray into each nostril as needed in the morning and 1 spray as needed at noon and 1 spray as needed in the evening and 1 spray as needed before  bedtime.   venlafaxine (EFFEXOR-XR) 75 mg 24 hr capsule  Self Yes No   Sig: Take 75 mg by mouth      Facility-Administered Medications: None     Current Discharge Medication List        CONTINUE these medications which have NOT CHANGED    Details   amLODIPine (NORVASC) 10 mg tablet Take 10 mg by mouth every evening      aspirin (ECOTRIN LOW STRENGTH) 81 mg EC tablet       atorvastatin (LIPITOR) 80 mg tablet Take 1 tablet (80 mg total) by mouth in the morning  Qty: 90 tablet, Refills: 3    Associated Diagnoses: Mixed hyperlipidemia; History of coronary artery bypass graft      budesonide-formoterol (SYMBICORT) 160-4.5 mcg/act inhaler Inhale 2 puffs 2 (two) times a day      buPROPion (WELLBUTRIN XL) 150 mg 24 hr tablet Take 150 mg by mouth        CARBOXYMETHYLCELLULOSE SODIUM OP Apply to eye      Cholecalciferol 50 MCG (2000 UT) TABS Take 50 mcg by mouth in the morning.      clotrimazole-betamethasone (LOTRISONE) 1-0.05 % cream Apply topically 2 (two) times a day To buttocks  Qty: 45 g, Refills: 0    Associated Diagnoses: Dermatophytosis      cyclobenzaprine (FLEXERIL) 10 mg tablet Take 10 mg by mouth daily as needed      Elastic Bandages & Supports ([employer] Lumbar/Back Support Brace) MISC Use daily  Qty: 1 each, Refills: 0    Associated Diagnoses: Chronic low back pain, unspecified back pain laterality, unspecified whether sciatica present      Empagliflozin (JARDIANCE) 10 MG TABS tablet Take 1 tablet (10 mg total) by mouth daily  Qty: 90 tablet, Refills: 3    Associated Diagnoses: Type 2 diabetes mellitus with other circulatory complication, without long-term current use of insulin ([facility])      eplerenone (INSPRA) 50 MG tablet Take 50 mg by mouth in the morning.      Evolocumab (Repatha SureClick) 140 MG/ML SOAJ Take one injection every two weeks  Qty: 6 mL, Refills: 3    Associated Diagnoses: Coronary artery disease involving native coronary artery of native heart without angina pectoris; Mixed hyperlipidemia       ezetimibe (ZETIA) 10 mg tablet Take 1 tablet (10 mg total) by mouth daily  Qty: 90 tablet, Refills: 3    Associated Diagnoses: Hypertension, unspecified type; Mixed hyperlipidemia; Prediabetes      fluticasone (FLONASE) 50 mcg/act nasal spray as needed      folic acid (FOLVITE) 1 mg tablet 1 mg      furosemide (LASIX) 20 mg tablet if needed      hydrochlorothiazide (HYDRODIURIL) 25 mg tablet Take 12.5 mg by mouth in the morning.      hydrocortisone 2.5 % cream       ibuprofen (MOTRIN) 600 mg tablet Take 600 mg by mouth every 6 (six) hours as needed for moderate pain      lidocaine (LIDODERM) 5 %       losartan (COZAAR) 100 MG tablet Take 100 mg by mouth in the morning.      melatonin 3 mg Take 3 mg by mouth      !! metoprolol succinate (TOPROL-XL) 25 mg 24 hr tablet Take 1 tablet (25 mg total) by mouth every morning  Qty: 90 tablet, Refills: 1    Associated Diagnoses: Bradycardia; Resistant hypertension; S/P ICD (internal cardiac defibrillator) procedure; Cardiac sarcoidosis      !! metoprolol succinate (TOPROL-XL) 50 mg 24 hr tablet Take 1 tablet (50 mg total) by mouth every morning  Qty: 90 tablet, Refills: 1    Associated Diagnoses: Bradycardia; Resistant hypertension; S/P ICD (internal cardiac defibrillator) procedure; Cardiac sarcoidosis      mometasone (ELOCON) 0.1 % cream Apply topically daily To wrist  Qty: 45 g, Refills: 0    Associated Diagnoses: Dermatitis      montelukast (SINGULAIR) 10 mg tablet Take 10 mg by mouth      Multiple Vitamin (MULTIVITAMIN PO)       omeprazole (PriLOSEC) 20 mg delayed release capsule take 1 capsule by mouth once daily  Qty: 60 capsule, Refills: 1    Associated Diagnoses: Gastroesophageal reflux disease with esophagitis without hemorrhage; Peptic ulcer disease      polyvinyl alcohol (LIQUIFILM TEARS) 1.4 % ophthalmic solution Administer 1 drop to both eyes in the morning and 1 drop at noon and 1 drop in the evening and 1 drop before bedtime.      sodium chloride (OCEAN) 0.65  % nasal spray 1 spray into each nostril as needed in the morning and 1 spray as needed at noon and 1 spray as needed in the evening and 1 spray as needed before bedtime.      venlafaxine (EFFEXOR-XR) 75 mg 24 hr capsule Take 75 mg by mouth       !! - Potential duplicate medications found. Please discuss with provider.        No discharge procedures on file.  ED SEPSIS DOCUMENTATION   Time reflects when diagnosis was documented in both MDM as applicable and the Disposition within this note       Time User Action Codes Description Comment    7/2/2025  7:55 PM Frederick Peraza Add [N17.9] EMILY (acute kidney injury) ([facility])     7/2/2025  7:55 PM Frederick Peraza Add [R00.2] Palpitations                      [1]   Past Medical History:  Diagnosis Date    Alcohol dependence, uncomplicated ([facility]) 10/28/2022    Coronary artery disease     Hyperlipemia     Hypertension     Irregular heart beat     bradycardia   [2]   Past Surgical History:  Procedure Laterality Date    ANTERIOR CRUCIATE LIGAMENT REPAIR Left 2001    CARDIAC DEFIBRILLATOR PLACEMENT      CARDIAC LOOP RECORDER  03/2022    MD from [facility] possible pacer placement    CARDIAC PACEMAKER PLACEMENT      CARDIAC SURGERY  2007    CABG x2   [3]   Family History  Problem Relation Name Age of Onset    Arthritis Mother      Leukemia Maternal Aunt      Mental illness Neg Hx     [4]   Social History  Tobacco Use    Smoking status: Former     Current packs/day: 0.25     Average packs/day: 0.3 packs/day for 56.5 years (14.1 ttl pk-yrs)     Types: Cigarettes     Start date: 1969     Passive exposure: Past    Smokeless tobacco: Never   Vaping Use    Vaping status: Never Used   Substance Use Topics    Alcohol use: Never    Drug use: Never        Frederick Peraza MD  07/02/25 5628

## 2025-07-03 ENCOUNTER — TELEPHONE (OUTPATIENT)
Age: 70
End: 2025-07-03

## 2025-07-03 LAB
ALBUMIN SERPL BCG-MCNC: 3.4 G/DL (ref 3.5–5)
ALP SERPL-CCNC: 60 U/L (ref 34–104)
ALT SERPL W P-5'-P-CCNC: 16 U/L (ref 7–52)
ANION GAP SERPL CALCULATED.3IONS-SCNC: 9 MMOL/L (ref 4–13)
ANION GAP SERPL CALCULATED.3IONS-SCNC: 9 MMOL/L (ref 4–13)
AST SERPL W P-5'-P-CCNC: 17 U/L (ref 13–39)
BILIRUB SERPL-MCNC: 0.53 MG/DL (ref 0.2–1)
BUN SERPL-MCNC: 40 MG/DL (ref 5–25)
BUN SERPL-MCNC: 45 MG/DL (ref 5–25)
CALCIUM ALBUM COR SERPL-MCNC: 9 MG/DL (ref 8.3–10.1)
CALCIUM SERPL-MCNC: 8.4 MG/DL (ref 8.4–10.2)
CALCIUM SERPL-MCNC: 8.5 MG/DL (ref 8.4–10.2)
CHLORIDE SERPL-SCNC: 107 MMOL/L (ref 96–108)
CHLORIDE SERPL-SCNC: 109 MMOL/L (ref 96–108)
CO2 SERPL-SCNC: 20 MMOL/L (ref 21–32)
CO2 SERPL-SCNC: 20 MMOL/L (ref 21–32)
CREAT SERPL-MCNC: 1.38 MG/DL (ref 0.6–1.3)
CREAT SERPL-MCNC: 1.68 MG/DL (ref 0.6–1.3)
ERYTHROCYTE [DISTWIDTH] IN BLOOD BY AUTOMATED COUNT: 15.3 % (ref 11.6–15.1)
GFR SERPL CREATININE-BSD FRML MDRD: 40 ML/MIN/1.73SQ M
GFR SERPL CREATININE-BSD FRML MDRD: 51 ML/MIN/1.73SQ M
GLUCOSE SERPL-MCNC: 119 MG/DL (ref 65–140)
GLUCOSE SERPL-MCNC: 98 MG/DL (ref 65–140)
HCT VFR BLD AUTO: 38.1 % (ref 36.5–49.3)
HGB BLD-MCNC: 12 G/DL (ref 12–17)
MAGNESIUM SERPL-MCNC: 2 MG/DL (ref 1.9–2.7)
MCH RBC QN AUTO: 25.6 PG (ref 26.8–34.3)
MCHC RBC AUTO-ENTMCNC: 31.5 G/DL (ref 31.4–37.4)
MCV RBC AUTO: 81 FL (ref 82–98)
PHOSPHATE SERPL-MCNC: 4 MG/DL (ref 2.3–4.1)
PLATELET # BLD AUTO: 301 THOUSANDS/UL (ref 149–390)
PMV BLD AUTO: 9.7 FL (ref 8.9–12.7)
POTASSIUM SERPL-SCNC: 3.5 MMOL/L (ref 3.5–5.3)
POTASSIUM SERPL-SCNC: 3.5 MMOL/L (ref 3.5–5.3)
PROT SERPL-MCNC: 6.5 G/DL (ref 6.4–8.4)
RBC # BLD AUTO: 4.68 MILLION/UL (ref 3.88–5.62)
SODIUM SERPL-SCNC: 136 MMOL/L (ref 135–147)
SODIUM SERPL-SCNC: 138 MMOL/L (ref 135–147)
TSH SERPL DL<=0.05 MIU/L-ACNC: 0.92 UIU/ML (ref 0.45–4.5)
WBC # BLD AUTO: 10.76 THOUSAND/UL (ref 4.31–10.16)

## 2025-07-03 PROCEDURE — 85027 COMPLETE CBC AUTOMATED: CPT

## 2025-07-03 PROCEDURE — 84443 ASSAY THYROID STIM HORMONE: CPT

## 2025-07-03 PROCEDURE — 99232 SBSQ HOSP IP/OBS MODERATE 35: CPT | Performed by: STUDENT IN AN ORGANIZED HEALTH CARE EDUCATION/TRAINING PROGRAM

## 2025-07-03 PROCEDURE — 80053 COMPREHEN METABOLIC PANEL: CPT

## 2025-07-03 PROCEDURE — 84100 ASSAY OF PHOSPHORUS: CPT

## 2025-07-03 PROCEDURE — 80048 BASIC METABOLIC PNL TOTAL CA: CPT

## 2025-07-03 PROCEDURE — 83735 ASSAY OF MAGNESIUM: CPT

## 2025-07-03 RX ADMIN — SODIUM CHLORIDE 75 ML/HR: 0.9 INJECTION, SOLUTION INTRAVENOUS at 21:44

## 2025-07-03 RX ADMIN — SODIUM CHLORIDE 150 ML/HR: 0.9 INJECTION, SOLUTION INTRAVENOUS at 03:17

## 2025-07-03 RX ADMIN — SODIUM CHLORIDE 75 ML/HR: 0.9 INJECTION, SOLUTION INTRAVENOUS at 11:14

## 2025-07-03 RX ADMIN — ASPIRIN 81 MG: 81 TABLET, CHEWABLE ORAL at 08:39

## 2025-07-03 RX ADMIN — HEPARIN SODIUM 5000 UNITS: 5000 INJECTION INTRAVENOUS; SUBCUTANEOUS at 17:39

## 2025-07-03 RX ADMIN — METOPROLOL SUCCINATE 75 MG: 50 TABLET, EXTENDED RELEASE ORAL at 08:38

## 2025-07-03 RX ADMIN — FLUTICASONE PROPIONATE 1 SPRAY: 50 SPRAY, METERED NASAL at 08:42

## 2025-07-03 RX ADMIN — BUPROPION HYDROCHLORIDE 150 MG: 150 TABLET, EXTENDED RELEASE ORAL at 08:38

## 2025-07-03 RX ADMIN — AMLODIPINE BESYLATE 10 MG: 10 TABLET ORAL at 17:25

## 2025-07-03 RX ADMIN — HYDROCHLOROTHIAZIDE 12.5 MG: 12.5 TABLET ORAL at 08:38

## 2025-07-03 RX ADMIN — Medication 3 MG: at 21:44

## 2025-07-03 RX ADMIN — ATORVASTATIN CALCIUM 80 MG: 80 TABLET, FILM COATED ORAL at 08:44

## 2025-07-03 RX ADMIN — EZETIMIBE 10 MG: 10 TABLET ORAL at 08:39

## 2025-07-03 RX ADMIN — HEPARIN SODIUM 5000 UNITS: 5000 INJECTION INTRAVENOUS; SUBCUTANEOUS at 08:40

## 2025-07-03 RX ADMIN — MONTELUKAST 10 MG: 10 TABLET, FILM COATED ORAL at 08:39

## 2025-07-03 NOTE — PLAN OF CARE
Problem: PAIN - ADULT  Goal: Verbalizes/displays adequate comfort level or baseline comfort level  Description: Interventions:  - Encourage patient to monitor pain and request assistance  - Assess pain using appropriate pain scale  - Administer analgesics as ordered based on type and severity of pain and evaluate response  - Implement non-pharmacological measures as appropriate and evaluate response  - Consider cultural and social influences on pain and pain management  - Notify physician/advanced practitioner if interventions unsuccessful or patient reports new pain  - Educate patient/family on pain management process including their role and importance of  reporting pain   - Provide non-pharmacologic/complimentary pain relief interventions  Outcome: Progressing     Problem: INFECTION - ADULT  Goal: Absence or prevention of progression during hospitalization  Description: INTERVENTIONS:  - Assess and monitor for signs and symptoms of infection  - Monitor lab/diagnostic results  - Monitor all insertion sites, i.e. indwelling lines, tubes, and drains  - Monitor endotracheal if appropriate and nasal secretions for changes in amount and color  - [place] appropriate cooling/warming therapies per order  - Administer medications as ordered  - Instruct and encourage patient and family to use good hand hygiene technique  - Identify and instruct in appropriate isolation precautions for identified infection/condition  Outcome: Progressing  Goal: Absence of fever/infection during neutropenic period  Description: INTERVENTIONS:  - Monitor WBC  - Perform strict hand hygiene  - Limit to healthy visitors only  - No plants, dried, fresh or silk flowers with lin in patient room  Outcome: Progressing     Problem: SAFETY ADULT  Goal: Patient will remain free of falls  Description: INTERVENTIONS:  - Educate patient/family on patient safety including physical limitations  - Instruct patient to call for assistance with activity   -  Consider consulting OT/PT to assist with strengthening/mobility based on AM PAC & JH-HLM score  - Consult OT/PT to assist with strengthening/mobility   - Keep Call bell within reach  - Keep bed low and locked with side rails adjusted as appropriate  - Keep care items and personal belongings within reach  - Initiate and maintain comfort rounds  - Make Fall Risk Sign visible to staff  - Offer Toileting every 2 Hours, in advance of need  - Initiate/Maintain bed alarm  - Obtain necessary fall risk management equipment: yellow socks  - Apply yellow socks and bracelet for high fall risk patients  - Consider moving patient to room near nurses station  Outcome: Progressing  Goal: Maintain or return to baseline ADL function  Description: INTERVENTIONS:  -  Assess patient's ability to carry out ADLs; assess patient's baseline for ADL function and identify physical deficits which impact ability to perform ADLs (bathing, care of mouth/teeth, toileting, grooming, dressing, etc.)  - Assess/evaluate cause of self-care deficits   - Assess range of motion  - Assess patient's mobility; develop plan if impaired  - Assess patient's need for assistive devices and provide as appropriate  - Encourage maximum independence but intervene and supervise when necessary  - Involve family in performance of ADLs  - Assess for home care needs following discharge   - Consider OT consult to assist with ADL evaluation and planning for discharge  - Provide patient education as appropriate  - Monitor functional capacity and physical performance, use of AM PAC & JH-HLM   - Monitor gait, balance and fatigue with ambulation    Outcome: Progressing  Goal: Maintains/Returns to pre admission functional level  Description: INTERVENTIONS:  - Perform AM-PAC 6 Click Basic Mobility/ Daily Activity assessment daily.  - Set and communicate daily mobility goal to care team and patient/family/caregiver.   - Collaborate with rehabilitation services on mobility goals if  consulted  - Perform Range of Motion 3 times a day.  - Reposition patient every 2 hours.  - Dangle patient 3 times a day  - Stand patient 3 times a day  - Ambulate patient 3 times a day  - Out of bed to chair 3 times a day   - Out of bed for meals 3 times a day  - Out of bed for toileting  - Record patient progress and toleration of activity level   Outcome: Progressing     Problem: DISCHARGE PLANNING  Goal: Discharge to home or other facility with appropriate resources  Description: INTERVENTIONS:  - Identify barriers to discharge w/patient and caregiver  - Arrange for needed discharge resources and transportation as appropriate  - Identify discharge learning needs (meds, wound care, etc.)  - Arrange for interpretive services to assist at discharge as needed  - Refer to Case Management Department for coordinating discharge planning if the patient needs post-hospital services based on physician/advanced practitioner order or complex needs related to functional status, cognitive ability, or social support system  Outcome: Progressing     Problem: Knowledge Deficit  Goal: Patient/family/caregiver demonstrates understanding of disease process, treatment plan, medications, and discharge instructions  Description: Complete learning assessment and assess knowledge base.  Interventions:  - Provide teaching at level of understanding  - Provide teaching via preferred learning methods  Outcome: Progressing

## 2025-07-03 NOTE — ASSESSMENT & PLAN NOTE
Denies any chest pain, reports short of breath at times, O2 sats in the ER greater than 95% on room air, he is not in acute distress  EKG on admission shows normal sinus rhythm, heart rate in the 70s there is no ST wave abnormalities  History of CABG x 2 in 2007  On beta-blocker, aspirin and statin daily, continue  ICD-Medtronic in place, last interrogation 4/30/2025 with normal device function at [facility]  Follows up closely with [facility] cardiology, last visit 6/20/2025

## 2025-07-03 NOTE — PLAN OF CARE
Problem: PAIN - ADULT  Goal: Verbalizes/displays adequate comfort level or baseline comfort level  Description: Interventions:  - Encourage patient to monitor pain and request assistance  - Assess pain using appropriate pain scale  - Administer analgesics as ordered based on type and severity of pain and evaluate response  - Implement non-pharmacological measures as appropriate and evaluate response  - Consider cultural and social influences on pain and pain management  - Notify physician/advanced practitioner if interventions unsuccessful or patient reports new pain  - Educate patient/family on pain management process including their role and importance of  reporting pain   - Provide non-pharmacologic/complimentary pain relief interventions  7/3/2025 1138 by Royce Dominique RN  Outcome: Progressing  7/3/2025 1138 by Royce Dominique RN  Outcome: Progressing     Problem: INFECTION - ADULT  Goal: Absence or prevention of progression during hospitalization  Description: INTERVENTIONS:  - Assess and monitor for signs and symptoms of infection  - Monitor lab/diagnostic results  - Monitor all insertion sites, i.e. indwelling lines, tubes, and drains  - Monitor endotracheal if appropriate and nasal secretions for changes in amount and color  - [place] appropriate cooling/warming therapies per order  - Administer medications as ordered  - Instruct and encourage patient and family to use good hand hygiene technique  - Identify and instruct in appropriate isolation precautions for identified infection/condition  7/3/2025 1138 by Royce Dominique RN  Outcome: Progressing  7/3/2025 1138 by Royce Dominique RN  Outcome: Progressing  Goal: Absence of fever/infection during neutropenic period  Description: INTERVENTIONS:  - Monitor WBC  - Perform strict hand hygiene  - Limit to healthy visitors only  - No plants, dried, fresh or silk flowers with lin in patient room  7/3/2025 1138 by Royce Dominique RN  Outcome:  Progressing  7/3/2025 1138 by Royce Dominique RN  Outcome: Progressing     Problem: SAFETY ADULT  Goal: Patient will remain free of falls  Description: INTERVENTIONS:  - Educate patient/family on patient safety including physical limitations  - Instruct patient to call for assistance with activity   - Consider consulting OT/PT to assist with strengthening/mobility based on AM PAC & JH-HLM score  - Consult OT/PT to assist with strengthening/mobility   - Keep Call bell within reach  - Keep bed low and locked with side rails adjusted as appropriate  - Keep care items and personal belongings within reach  - Initiate and maintain comfort rounds  - Make Fall Risk Sign visible to staff  - Apply yellow socks and bracelet for high fall risk patients  - Consider moving patient to room near nurses station  7/3/2025 1138 by Royce Dominique RN  Outcome: Progressing  7/3/2025 1138 by Royce Dominique RN  Outcome: Progressing  Goal: Maintain or return to baseline ADL function  Description: INTERVENTIONS:  -  Assess patient's ability to carry out ADLs; assess patient's baseline for ADL function and identify physical deficits which impact ability to perform ADLs (bathing, care of mouth/teeth, toileting, grooming, dressing, etc.)  - Assess/evaluate cause of self-care deficits   - Assess range of motion  - Assess patient's mobility; develop plan if impaired  - Assess patient's need for assistive devices and provide as appropriate  - Encourage maximum independence but intervene and supervise when necessary  - Involve family in performance of ADLs  - Assess for home care needs following discharge   - Consider OT consult to assist with ADL evaluation and planning for discharge  - Provide patient education as appropriate  - Monitor functional capacity and physical performance, use of AM PAC & JH-HLM   - Monitor gait, balance and fatigue with ambulation    7/3/2025 1138 by Royce Dominique RN  Outcome: Progressing  7/3/2025 1138  by Royce Dominique RN  Outcome: Progressing  Goal: Maintains/Returns to pre admission functional level  Description: INTERVENTIONS:  - Perform AM-PAC 6 Click Basic Mobility/ Daily Activity assessment daily.  - Set and communicate daily mobility goal to care team and patient/family/caregiver.   - Collaborate with rehabilitation services on mobility goals if consulted  - Out of bed for toileting  - Record patient progress and toleration of activity level   7/3/2025 1138 by Royce Dominique RN  Outcome: Progressing  7/3/2025 1138 by Royce Dominique RN  Outcome: Progressing     Problem: DISCHARGE PLANNING  Goal: Discharge to home or other facility with appropriate resources  Description: INTERVENTIONS:  - Identify barriers to discharge w/patient and caregiver  - Arrange for needed discharge resources and transportation as appropriate  - Identify discharge learning needs (meds, wound care, etc.)  - Arrange for interpretive services to assist at discharge as needed  - Refer to Case Management Department for coordinating discharge planning if the patient needs post-hospital services based on physician/advanced practitioner order or complex needs related to functional status, cognitive ability, or social support system  7/3/2025 1138 by Royce Dominique RN  Outcome: Progressing  7/3/2025 1138 by Royce Dominique RN  Outcome: Progressing     Problem: Knowledge Deficit  Goal: Patient/family/caregiver demonstrates understanding of disease process, treatment plan, medications, and discharge instructions  Description: Complete learning assessment and assess knowledge base.  Interventions:  - Provide teaching at level of understanding  - Provide teaching via preferred learning methods  7/3/2025 1138 by Royce Dominique RN  Outcome: Progressing  7/3/2025 1138 by Royce Dominique RN  Outcome: Progressing

## 2025-07-03 NOTE — ASSESSMENT & PLAN NOTE
Reports feeling unwell for a few days, reports palpitation and shortness of breath  EKG on arrival shows normal sinus rhythm heart rate in the 70s  On assessment he is euvolemic, there is no edema, O2 sats greater than 95% on room air, he is in no acute distress  Etiology of generalized weakness is unclear however could be related to a viral respiratory infection  Continue close monitoring

## 2025-07-03 NOTE — ASSESSMENT & PLAN NOTE
Latest Reference Range & Units 07/02/25 19:02   BUN 5 - 25 mg/dL 47 (H)   Creatinine 0.60 - 1.30 mg/dL 2.17 (H)     Reports eating and drinking, reports the new use of Jardiance that could be causing EMILY  NS x 1 bolus, continue with hydration on the floors  Etiology of EMILY could be due to the use of SGLT 2  Monitor kidney function at midnight and then 6 AM  Hold Jardiance, ARB's and thiazide diuretics  Avoid nephrotoxic medications  Monitor kidney function with daily BMPs

## 2025-07-03 NOTE — ASSESSMENT & PLAN NOTE
Last MICHAEL in 2022 shows LVEF 60%  Spouse and patient reports a recent echocardiogram in [place], report unavailable  Last PET scan and cardiac MRI consistent with cardiac sarcoidosis  He does follow out as an outpatient with [facility] cardiology

## 2025-07-03 NOTE — ASSESSMENT & PLAN NOTE
>>ASSESSMENT AND PLAN FOR HTN (HYPERTENSION) WRITTEN ON 7/2/2025 11:25 PM BY SUZANNE MCCLELLAND    BP stable on presentation  Patient is on hydrochlorothiazide, losartan, amlodipine, beta-blocker and Lasix 20 mg as needed  Will hold ARB and hydrochlorothiazide due to EMILY  We will add Lasix 20 mg if there is any concern for volume overload  Monitor BP per unit protocol when needed

## 2025-07-03 NOTE — PROGRESS NOTES
Progress Note - Hospitalist   Name: Robin Stephens 69 y.o. male I MRN: [id number]  Unit/Bed#: [address] [place] [address] Date of Admission: 7/2/2025   Date of Service: 7/3/2025 I Hospital Day: 0     Assessment & Plan  EMILY (acute kidney injury) (HCC)  Reports eating and drinking, reports the new use of Jardiance that could be causing EMILY  Creatinine baseline of 0.9 to 1.1  Etiology of EMILY could be due to the use of SGLT 2  Hold Jardiance, ARB's and thiazide diuretics  Renal function is showing improvement, creatinine 2.17, to 1.68 and now 1.38  Continue gentle IV fluids, repeat BMP in a.m.  Generalized weakness  Reports feeling unwell for a few days, reports palpitation and shortness of breath  EKG on arrival shows normal sinus rhythm heart rate in the 70s  Appears euvolemic, BNP normal  Reports some improvement, suspect likely due to dehydration and EMILY  Continue to feel somewhat dizzy and weak when ambulating independently  Coronary artery disease  Denies any chest pain  EKG without ischemic changes  History of CABG x 2 in 2007  Continue beta-blocker, aspirin and statin  ICD-Medtronic in place, last interrogation 4/30/2025 with normal device function at [facility]  Follows up closely with [facility] cardiology, last visit 6/20/2025  Cardiac sarcoidosis  Last TTE in 03/2025 shows LVEF 60%  Spouse and patient reports a recent echocardiogram in [place], report unavailable  Last PET scan and cardiac MRI consistent with cardiac sarcoidosis  He does follow out as an outpatient with [facility] cardiology  TIA (transient ischemic attack)  History of TIA in 2012  Continue with aspirin and statin daily  HTN (hypertension)  Patient is on hydrochlorothiazide, losartan, amlodipine, beta-blocker and Lasix 20 mg as needed  Hold losartan, HCTZ in setting of EMILY  Monitor BP per unit protocol when needed  Asthma, moderate persistent  Does not appear to be on an exacerbation  Continue with Singulair  Prediabetes  Last  hemoglobin A1c at 6.9 he was started on Jardiance since he is at high risk of cardiac complications and failed lifestyle/dietary modifications  Hold Jardiance at this time, consider metformin on discharge    VTE Pharmacologic Prophylaxis:   Pharmacologic: Heparin  Mechanical VTE Prophylaxis in Place: Yes    Current Length of Stay: 0 day(s)    Current Patient Status: Inpatient   Certification Statement: The patient will continue to require additional inpatient hospital stay due to monitor renal functions    Discharge Plan: 24 hours    Code Status: Level 1 - Full Code      Subjective:   Patient reports his feeling of weakness, fatigue has improved from day prior.  Has been eating and drinking without any issues.  Initially discussed discharge home today, but shortly after patient got up and ambulated reporting some dizziness.  Denying any chest pain, shortness of breath, nausea or vomiting.    Objective:     Vitals:   Temp (24hrs), Av.1 °F (36.7 °C), Min:97.5 °F (36.4 °C), Max:98.8 °F (37.1 °C)    Temp:  [97.5 °F (36.4 °C)-98.8 °F (37.1 °C)] 98.2 °F (36.8 °C)  HR:  [60-91] 63  Resp:  [12-20] 12  BP: (123-160)/(60-85) 137/64  SpO2:  [92 %-97 %] 97 %  Body mass index is 25.18 kg/m².     Input and Output Summary (last 24 hours):       Intake/Output Summary (Last 24 hours) at 7/3/2025 1303  Last data filed at 7/3/2025 0530  Gross per 24 hour   Intake 500 ml   Output 800 ml   Net -300 ml       Physical Exam:     Physical Exam  Vitals and nursing note reviewed.   Constitutional:       Appearance: Normal appearance.   HENT:      Head: Normocephalic.     Eyes:      Conjunctiva/sclera: Conjunctivae normal.       Cardiovascular:      Rate and Rhythm: Normal rate.   Pulmonary:      Effort: Pulmonary effort is normal.      Breath sounds: No wheezing.   Abdominal:      General: Bowel sounds are normal. There is no distension.      Palpations: Abdomen is soft.     Musculoskeletal:         General: No swelling.      Right lower  leg: No edema.      Left lower leg: No edema.     Skin:     General: Skin is warm and dry.     Neurological:      General: No focal deficit present.      Mental Status: He is alert. Mental status is at baseline.       Additional Data:     Labs:    Results from last 7 days   Lab Units 07/03/25  0459 07/02/25  1902   WBC Thousand/uL 10.76* 11.90*   HEMOGLOBIN g/dL 12.0 14.3   HEMATOCRIT % 38.1 44.1   PLATELETS Thousands/uL 301 393*   SEGS PCT %  --  79*   LYMPHO PCT %  --  11*   MONO PCT %  --  9   EOS PCT %  --  1     Results from last 7 days   Lab Units 07/03/25  0459   SODIUM mmol/L 138   POTASSIUM mmol/L 3.5   CHLORIDE mmol/L 109*   CO2 mmol/L 20*   BUN mg/dL 40*   CREATININE mg/dL 1.38*   ANION GAP mmol/L 9   CALCIUM mg/dL 8.5   ALBUMIN g/dL 3.4*   TOTAL BILIRUBIN mg/dL 0.53   ALK PHOS U/L 60   ALT U/L 16   AST U/L 17   GLUCOSE RANDOM mg/dL 98     Results from last 7 days   Lab Units 07/02/25  1902   INR  1.02                       * I Have Reviewed All Lab Data Listed Above.  * Additional Pertinent Lab Tests Reviewed: All Labs For Current Hospital Admission Reviewed    Mobility:  Basic Mobility Inpatient Raw Score: 24  [facility] Goal: 8: Walk 250 feet or more  -[facility] Achieved: 8: Walk 250 feet ot more    Lines:     Invasive Devices       Peripheral Intravenous Line  Duration             Peripheral IV 07/02/25 Left;Ventral (anterior) Forearm <1 day                       Imaging:    Imaging Reports Reviewed Today Include:     No results found.      Recent Cultures (last 7 days):           Last 24 Hours Medication List:   Current Facility-Administered Medications   Medication Dose Route Frequency Provider Last Rate    acetaminophen  975 mg Oral Q6H PRN SUZANNE Aden      aluminum-magnesium hydroxide-simethicone  30 mL Oral Q6H PRN SUZANNE Aden      amLODIPine  10 mg Oral QPM SUZANNE Aden      aspirin  81 mg Oral Daily SUZANNE Aden      atorvastatin  80 mg Oral Daily SUZANNE Aden       buPROPion  150 mg Oral Daily SUZANNE Aden      ezetimibe  10 mg Oral Daily SUZANNE Aden      fluticasone  1 spray Each Nare Daily SUZANNE Aden      heparin (porcine)  5,000 Units Subcutaneous Q8H LOU SUZANNE Aden      [Held by provider] hydroCHLOROthiazide  12.5 mg Oral Daily SUAZNNE Aden      [Held by provider] losartan  100 mg Oral Daily SUZANNE Aden      metoprolol succinate  75 mg Oral QAM SUZANNE Aden      montelukast  10 mg Oral Daily SUZANNE Aden      ondansetron  4 mg Intravenous Q6H PRN SUZANNE Aden      polyethylene glycol  17 g Oral Daily PRN SUZANNE Aden      sodium chloride  75 mL/hr Intravenous Continuous Alvaro Zhao MD 75 mL/hr (07/03/25 1114)        Today, Patient Was Seen By: Alvaro Zhao MD    ** Please Note: Dictation voice to text software may have been used in the creation of this document. **

## 2025-07-03 NOTE — ASSESSMENT & PLAN NOTE
Reports eating and drinking, reports the new use of Jardiance that could be causing EMILY  Creatinine baseline of 0.9 to 1.1  Etiology of EMILY could be due to the use of SGLT 2  Hold Jardiance, ARB's and thiazide diuretics  Renal function is showing improvement, creatinine 2.17, to 1.68 and now 1.38  Continue gentle IV fluids, repeat BMP in a.m.

## 2025-07-03 NOTE — TELEPHONE ENCOUNTER
Patient's wife Es called and is requesting to speak with Dr. Lombardi, next week when he has time and is back from vacation.  She stated the patient was admitted to the hospital yesterday after being taken to the the Emergency Room.  He was dehydrated, having palpitations, feeling drained, tired and not himself.  They told them he had acute kidney dysfunction that may have been caused by the Jardiance the patient was taking to treat prediabetes.  They advised that he discontinue the medication.  She would like to speak with Dr. Lombardi to discuss.  Thank you!

## 2025-07-03 NOTE — ASSESSMENT & PLAN NOTE
Denies any chest pain  EKG without ischemic changes  History of CABG x 2 in 2007  Continue beta-blocker, aspirin and statin  ICD-Medtronic in place, last interrogation 4/30/2025 with normal device function at [facility]  Follows up closely with [facility] cardiology, last visit 6/20/2025

## 2025-07-03 NOTE — ASSESSMENT & PLAN NOTE
Last TTE in 03/2025 shows LVEF 60%  Spouse and patient reports a recent echocardiogram in [place], report unavailable  Last PET scan and cardiac MRI consistent with cardiac sarcoidosis  He does follow out as an outpatient with [facility] cardiology

## 2025-07-03 NOTE — ASSESSMENT & PLAN NOTE
BP stable on presentation  Patient is on hydrochlorothiazide, losartan, amlodipine, beta-blocker and Lasix 20 mg as needed  Will hold ARB and hydrochlorothiazide due to EMILY  We will add Lasix 20 mg if there is any concern for volume overload  Monitor BP per unit protocol when needed

## 2025-07-03 NOTE — TELEPHONE ENCOUNTER
Pt's wife Brigid called in stating that pt went to ED yesterday for SOB and fatigue. He was admitted for low kidney function. She states that ED told him to stop taking jardiance. She would like to know if Dr. Glasgow can please give her a call as she has some concerns.     #[phone]

## 2025-07-03 NOTE — ASSESSMENT & PLAN NOTE
>>ASSESSMENT AND PLAN FOR HTN (HYPERTENSION) WRITTEN ON 7/3/2025  1:14 PM BY TRISH HERNANDEZ MD    Patient is on hydrochlorothiazide, losartan, amlodipine, beta-blocker and Lasix 20 mg as needed  Hold losartan, HCTZ in setting of EMILY  Monitor BP per unit protocol when needed

## 2025-07-03 NOTE — ASSESSMENT & PLAN NOTE
Last hemoglobin A1c at 6.9 he was started on Jardiance since he is at high risk of cardiac complications and failed lifestyle/dietary modifications  Blood glucose on admission 94  Jardiance on hold for now  Consider metformin at discharge

## 2025-07-03 NOTE — PLAN OF CARE
Problem: PAIN - ADULT  Goal: Verbalizes/displays adequate comfort level or baseline comfort level  Description: Interventions:  - Encourage patient to monitor pain and request assistance  - Assess pain using appropriate pain scale  - Administer analgesics as ordered based on type and severity of pain and evaluate response  - Implement non-pharmacological measures as appropriate and evaluate response  - Consider cultural and social influences on pain and pain management  - Notify physician/advanced practitioner if interventions unsuccessful or patient reports new pain  - Educate patient/family on pain management process including their role and importance of  reporting pain   - Provide non-pharmacologic/complimentary pain relief interventions  Outcome: Progressing     Problem: INFECTION - ADULT  Goal: Absence or prevention of progression during hospitalization  Description: INTERVENTIONS:  - Assess and monitor for signs and symptoms of infection  - Monitor lab/diagnostic results  - Monitor all insertion sites, i.e. indwelling lines, tubes, and drains  - Monitor endotracheal if appropriate and nasal secretions for changes in amount and color  - [place] appropriate cooling/warming therapies per order  - Administer medications as ordered  - Instruct and encourage patient and family to use good hand hygiene technique  - Identify and instruct in appropriate isolation precautions for identified infection/condition  Outcome: Progressing  Goal: Absence of fever/infection during neutropenic period  Description: INTERVENTIONS:  - Monitor WBC  - Perform strict hand hygiene  - Limit to healthy visitors only  - No plants, dried, fresh or silk flowers with lin in patient room  Outcome: Progressing     Problem: SAFETY ADULT  Goal: Patient will remain free of falls  Description: INTERVENTIONS:  - Educate patient/family on patient safety including physical limitations  - Instruct patient to call for assistance with activity   -  Consider consulting OT/PT to assist with strengthening/mobility based on AM PAC & JH-HLM score  - Consult OT/PT to assist with strengthening/mobility   - Keep Call bell within reach  - Keep bed low and locked with side rails adjusted as appropriate  - Keep care items and personal belongings within reach  - Initiate and maintain comfort rounds  - Make Fall Risk Sign visible to staff  - Offer Toileting every *** Hours, in advance of need  - Initiate/Maintain ***alarm  - Obtain necessary fall risk management equipment: ***  - Apply yellow socks and bracelet for high fall risk patients  - Consider moving patient to room near nurses station  Outcome: Progressing  Goal: Maintain or return to baseline ADL function  Description: INTERVENTIONS:  -  Assess patient's ability to carry out ADLs; assess patient's baseline for ADL function and identify physical deficits which impact ability to perform ADLs (bathing, care of mouth/teeth, toileting, grooming, dressing, etc.)  - Assess/evaluate cause of self-care deficits   - Assess range of motion  - Assess patient's mobility; develop plan if impaired  - Assess patient's need for assistive devices and provide as appropriate  - Encourage maximum independence but intervene and supervise when necessary  - Involve family in performance of ADLs  - Assess for home care needs following discharge   - Consider OT consult to assist with ADL evaluation and planning for discharge  - Provide patient education as appropriate  - Monitor functional capacity and physical performance, use of AM PAC & JH-HLM   - Monitor gait, balance and fatigue with ambulation    Outcome: Progressing  Goal: Maintains/Returns to pre admission functional level  Description: INTERVENTIONS:  - Perform AM-PAC 6 Click Basic Mobility/ Daily Activity assessment daily.  - Set and communicate daily mobility goal to care team and patient/family/caregiver.   - Collaborate with rehabilitation services on mobility goals if  consulted  - Perform Range of Motion *** times a day.  - Reposition patient every *** hours.  - Dangle patient *** times a day  - Stand patient *** times a day  - Ambulate patient *** times a day  - Out of bed to chair *** times a day   - Out of bed for meals *** times a day  - Out of bed for toileting  - Record patient progress and toleration of activity level   Outcome: Progressing     Problem: DISCHARGE PLANNING  Goal: Discharge to home or other facility with appropriate resources  Description: INTERVENTIONS:  - Identify barriers to discharge w/patient and caregiver  - Arrange for needed discharge resources and transportation as appropriate  - Identify discharge learning needs (meds, wound care, etc.)  - Arrange for interpretive services to assist at discharge as needed  - Refer to Case Management Department for coordinating discharge planning if the patient needs post-hospital services based on physician/advanced practitioner order or complex needs related to functional status, cognitive ability, or social support system  Outcome: Progressing     Problem: Knowledge Deficit  Goal: Patient/family/caregiver demonstrates understanding of disease process, treatment plan, medications, and discharge instructions  Description: Complete learning assessment and assess knowledge base.  Interventions:  - Provide teaching at level of understanding  - Provide teaching via preferred learning methods  Outcome: Progressing

## 2025-07-03 NOTE — ASSESSMENT & PLAN NOTE
Last hemoglobin A1c at 6.9 he was started on Jardiance since he is at high risk of cardiac complications and failed lifestyle/dietary modifications  Hold Jardiance at this time, consider metformin on discharge

## 2025-07-03 NOTE — ASSESSMENT & PLAN NOTE
Patient is on hydrochlorothiazide, losartan, amlodipine, beta-blocker and Lasix 20 mg as needed  Hold losartan, HCTZ in setting of EMILY  Monitor BP per unit protocol when needed

## 2025-07-03 NOTE — ASSESSMENT & PLAN NOTE
Reports feeling unwell for a few days, reports palpitation and shortness of breath  EKG on arrival shows normal sinus rhythm heart rate in the 70s  Appears euvolemic, BNP normal  Reports some improvement, suspect likely due to dehydration and EMILY  Continue to feel somewhat dizzy and weak when ambulating independently

## 2025-07-03 NOTE — ASSESSMENT & PLAN NOTE
History of TIA in 2012, denies any residual, any acute symptoms  Neurological function is intact  Continue with aspirin and statin daily

## 2025-07-04 VITALS
WEIGHT: 156.4 LBS | HEIGHT: 66 IN | HEART RATE: 81 BPM | SYSTOLIC BLOOD PRESSURE: 127 MMHG | BODY MASS INDEX: 25.13 KG/M2 | RESPIRATION RATE: 18 BRPM | DIASTOLIC BLOOD PRESSURE: 77 MMHG | OXYGEN SATURATION: 97 % | TEMPERATURE: 98.1 F

## 2025-07-04 LAB
ANION GAP SERPL CALCULATED.3IONS-SCNC: 7 MMOL/L (ref 4–13)
ATRIAL RATE: 77 BPM
BUN SERPL-MCNC: 21 MG/DL (ref 5–25)
CALCIUM SERPL-MCNC: 9.1 MG/DL (ref 8.4–10.2)
CHLORIDE SERPL-SCNC: 109 MMOL/L (ref 96–108)
CO2 SERPL-SCNC: 23 MMOL/L (ref 21–32)
CREAT SERPL-MCNC: 0.98 MG/DL (ref 0.6–1.3)
ERYTHROCYTE [DISTWIDTH] IN BLOOD BY AUTOMATED COUNT: 15.2 % (ref 11.6–15.1)
GFR SERPL CREATININE-BSD FRML MDRD: 78 ML/MIN/1.73SQ M
GLUCOSE SERPL-MCNC: 92 MG/DL (ref 65–140)
HCT VFR BLD AUTO: 41.4 % (ref 36.5–49.3)
HGB BLD-MCNC: 13.2 G/DL (ref 12–17)
MAGNESIUM SERPL-MCNC: 2 MG/DL (ref 1.9–2.7)
MCH RBC QN AUTO: 25.8 PG (ref 26.8–34.3)
MCHC RBC AUTO-ENTMCNC: 31.9 G/DL (ref 31.4–37.4)
MCV RBC AUTO: 81 FL (ref 82–98)
P AXIS: 36 DEGREES
PLATELET # BLD AUTO: 327 THOUSANDS/UL (ref 149–390)
PMV BLD AUTO: 9.6 FL (ref 8.9–12.7)
POTASSIUM SERPL-SCNC: 4.1 MMOL/L (ref 3.5–5.3)
PR INTERVAL: 260 MS
QRS AXIS: 242 DEGREES
QRSD INTERVAL: 154 MS
QT INTERVAL: 428 MS
QTC INTERVAL: 484 MS
RBC # BLD AUTO: 5.12 MILLION/UL (ref 3.88–5.62)
SODIUM SERPL-SCNC: 139 MMOL/L (ref 135–147)
T WAVE AXIS: 59 DEGREES
VENTRICULAR RATE: 77 BPM
WBC # BLD AUTO: 9.76 THOUSAND/UL (ref 4.31–10.16)

## 2025-07-04 PROCEDURE — 83735 ASSAY OF MAGNESIUM: CPT | Performed by: STUDENT IN AN ORGANIZED HEALTH CARE EDUCATION/TRAINING PROGRAM

## 2025-07-04 PROCEDURE — 93010 ELECTROCARDIOGRAM REPORT: CPT | Performed by: INTERNAL MEDICINE

## 2025-07-04 PROCEDURE — 99239 HOSP IP/OBS DSCHRG MGMT >30: CPT | Performed by: INTERNAL MEDICINE

## 2025-07-04 PROCEDURE — 85027 COMPLETE CBC AUTOMATED: CPT | Performed by: STUDENT IN AN ORGANIZED HEALTH CARE EDUCATION/TRAINING PROGRAM

## 2025-07-04 PROCEDURE — 80048 BASIC METABOLIC PNL TOTAL CA: CPT | Performed by: STUDENT IN AN ORGANIZED HEALTH CARE EDUCATION/TRAINING PROGRAM

## 2025-07-04 RX ADMIN — BUPROPION HYDROCHLORIDE 150 MG: 150 TABLET, EXTENDED RELEASE ORAL at 08:19

## 2025-07-04 RX ADMIN — FLUTICASONE PROPIONATE 1 SPRAY: 50 SPRAY, METERED NASAL at 08:21

## 2025-07-04 RX ADMIN — ASPIRIN 81 MG: 81 TABLET, CHEWABLE ORAL at 08:19

## 2025-07-04 RX ADMIN — METOPROLOL SUCCINATE 75 MG: 50 TABLET, EXTENDED RELEASE ORAL at 08:19

## 2025-07-04 RX ADMIN — MONTELUKAST 10 MG: 10 TABLET, FILM COATED ORAL at 08:19

## 2025-07-04 RX ADMIN — HEPARIN SODIUM 5000 UNITS: 5000 INJECTION INTRAVENOUS; SUBCUTANEOUS at 01:25

## 2025-07-04 RX ADMIN — EZETIMIBE 10 MG: 10 TABLET ORAL at 08:19

## 2025-07-04 RX ADMIN — ATORVASTATIN CALCIUM 80 MG: 80 TABLET, FILM COATED ORAL at 08:19

## 2025-07-04 RX ADMIN — HEPARIN SODIUM 5000 UNITS: 5000 INJECTION INTRAVENOUS; SUBCUTANEOUS at 08:20

## 2025-07-04 NOTE — ASSESSMENT & PLAN NOTE
History of CAD status post CABG, cardiac sarcoidosis, primary hypertension, asthma, anxiety/depression/PTSD who presented to the hospital for weakness  Patient found to have kidney injury upon arrival  Etiology: Jardiance vs hypotension from multiple medications and recent weight loss  With gentle fluids and holding losartan/HCT renal function has returned back to baseline of creatinine 1.0  Discharge: Advised holding amlodipine to avoid hypotension.  Holding Jardiance until outpatient follow-up.    Results from last 7 days   Lab Units 07/04/25  0600 07/03/25  0459 07/03/25  0004 07/02/25  1902   BUN mg/dL 21 40* 45* 47*   CREATININE mg/dL 0.98 1.38* 1.68* 2.17*   EGFR ml/min/1.73sq m 78 51 40 29

## 2025-07-04 NOTE — NURSING NOTE
AVS reviewed with patient including follow up appointments, discharge instructions and medications. Patient states understanding. Discharged to home at this time with belongings.

## 2025-07-04 NOTE — PLAN OF CARE
Problem: PAIN - ADULT  Goal: Verbalizes/displays adequate comfort level or baseline comfort level  Description: Interventions:  - Encourage patient to monitor pain and request assistance  - Assess pain using appropriate pain scale  - Administer analgesics as ordered based on type and severity of pain and evaluate response  - Implement non-pharmacological measures as appropriate and evaluate response  - Consider cultural and social influences on pain and pain management  - Notify physician/advanced practitioner if interventions unsuccessful or patient reports new pain  - Educate patient/family on pain management process including their role and importance of  reporting pain   - Provide non-pharmacologic/complimentary pain relief interventions  Outcome: Progressing     Problem: INFECTION - ADULT  Goal: Absence or prevention of progression during hospitalization  Description: INTERVENTIONS:  - Assess and monitor for signs and symptoms of infection  - Monitor lab/diagnostic results  - Monitor all insertion sites, i.e. indwelling lines, tubes, and drains  - Monitor endotracheal if appropriate and nasal secretions for changes in amount and color  - [place] appropriate cooling/warming therapies per order  - Administer medications as ordered  - Instruct and encourage patient and family to use good hand hygiene technique  - Identify and instruct in appropriate isolation precautions for identified infection/condition  Outcome: Progressing  Goal: Absence of fever/infection during neutropenic period  Description: INTERVENTIONS:  - Monitor WBC  - Perform strict hand hygiene  - Limit to healthy visitors only  - No plants, dried, fresh or silk flowers with lin in patient room  Outcome: Progressing     Problem: SAFETY ADULT  Goal: Patient will remain free of falls  Description: INTERVENTIONS:  - Educate patient/family on patient safety including physical limitations  - Instruct patient to call for assistance with activity   -  Consider consulting OT/PT to assist with strengthening/mobility based on AM PAC & JH-HLM score  - Consult OT/PT to assist with strengthening/mobility   - Keep Call bell within reach  - Keep bed low and locked with side rails adjusted as appropriate  - Keep care items and personal belongings within reach  - Initiate and maintain comfort rounds  - Make Fall Risk Sign visible to staff  - Offer Toileting every 2 Hours, in advance of need  - Initiate/Maintain bed and chair alarm  - Obtain necessary fall risk management equipment: bed and chair alarm/yellow socks and bracelet   - Apply yellow socks and bracelet for high fall risk patients  - Consider moving patient to room near nurses station  Outcome: Progressing  Goal: Maintain or return to baseline ADL function  Description: INTERVENTIONS:  -  Assess patient's ability to carry out ADLs; assess patient's baseline for ADL function and identify physical deficits which impact ability to perform ADLs (bathing, care of mouth/teeth, toileting, grooming, dressing, etc.)  - Assess/evaluate cause of self-care deficits   - Assess range of motion  - Assess patient's mobility; develop plan if impaired  - Assess patient's need for assistive devices and provide as appropriate  - Encourage maximum independence but intervene and supervise when necessary  - Involve family in performance of ADLs  - Assess for home care needs following discharge   - Consider OT consult to assist with ADL evaluation and planning for discharge  - Provide patient education as appropriate  - Monitor functional capacity and physical performance, use of AM PAC & JH-HLM   - Monitor gait, balance and fatigue with ambulation    Outcome: Progressing  Goal: Maintains/Returns to pre admission functional level  Description: INTERVENTIONS:  - Perform AM-PAC 6 Click Basic Mobility/ Daily Activity assessment daily.  - Set and communicate daily mobility goal to care team and patient/family/caregiver.   - Collaborate with  rehabilitation services on mobility goals if consulted  - Perform Range of Motion 3 times a day.  - Reposition patient every 2 hours.  - Dangle patient 3 times a day  - Stand patient 3 times a day  - Ambulate patient 3 times a day  - Out of bed to chair 3 times a day   - Out of bed for meals 3 times a day  - Out of bed for toileting  - Record patient progress and toleration of activity level   Outcome: Progressing     Problem: DISCHARGE PLANNING  Goal: Discharge to home or other facility with appropriate resources  Description: INTERVENTIONS:  - Identify barriers to discharge w/patient and caregiver  - Arrange for needed discharge resources and transportation as appropriate  - Identify discharge learning needs (meds, wound care, etc.)  - Arrange for interpretive services to assist at discharge as needed  - Refer to Case Management Department for coordinating discharge planning if the patient needs post-hospital services based on physician/advanced practitioner order or complex needs related to functional status, cognitive ability, or social support system  Outcome: Progressing     Problem: Knowledge Deficit  Goal: Patient/family/caregiver demonstrates understanding of disease process, treatment plan, medications, and discharge instructions  Description: Complete learning assessment and assess knowledge base.  Interventions:  - Provide teaching at level of understanding  - Provide teaching via preferred learning methods  Outcome: Progressing

## 2025-07-04 NOTE — DISCHARGE SUMMARY
Discharge Summary - Hospitalist   Name: Robin Stephens 69 y.o. male I MRN: [id number]  Unit/Bed#: 4 [place] [address]-01 I Date of Admission: 7/2/2025   Date of Service: 7/4/2025 I Hospital Day: 1    Assessment & Plan  EMILY (acute kidney injury) (HCC)  History of CAD status post CABG, cardiac sarcoidosis, primary hypertension, asthma, anxiety/depression/PTSD who presented to the hospital for weakness  Patient found to have kidney injury upon arrival  Etiology: Jardiance vs hypotension from multiple medications and recent weight loss  With gentle fluids and holding losartan/HCT renal function has returned back to baseline of creatinine 1.0  Discharge: Advised holding amlodipine to avoid hypotension.  Holding Jardiance until outpatient follow-up.    Results from last 7 days   Lab Units 07/04/25  0600 07/03/25  0459 07/03/25  0004 07/02/25  1902   BUN mg/dL 21 40* 45* 47*   CREATININE mg/dL 0.98 1.38* 1.68* 2.17*   EGFR ml/min/1.73sq m 78 51 40 29     Generalized weakness  Likely related to kidney injury/dehydration  Patient feeling better  Coronary artery disease  CAD with history of CABG 2007  Status post ICD.  Follows with cardiology  No chest pain  Cardiac sarcoidosis  Follows with [facility]  HTN (hypertension)  Patient on multiple medications.  Reports losing weight recently  Due to kidney injury avoiding hypotension.  Hold on further amlodipine.  Can continue losartan hydrochlorothiazide eplerenone and metoprolol for cardiac reasons  Asthma, moderate persistent  No exacerbation.  Continue montelukast  Prediabetes  Prediabetes recently started on jardiance.  Advised holding until okay to restart as an outpatient      Medical Problems       Resolved Problems  Date Reviewed: 7/2/2025   None        Discharging Physician / Practitioner: Soto Hairston DO  PCP: Frank Lombardi, DO  Admission Date:   Admission Orders (From admission, onward)       Ordered        07/03/25 1251  INPATIENT ADMISSION  Once             07/02/25 1956  Place in Observation  Once                        Discharge Date: 07/04/25    Medication Changes for Discharge & Rationale:   Holding amlodipine  Holding Jardiance and ibuprofen  See after visit summary for reconciled discharge medications provided to patient and/or family.     Consultations During Hospital Stay:  None     Procedures Performed:   * No surgery found *     Images:   XR chest 1 view portable  Result Date: 7/3/2025  Impression: No acute cardiopulmonary disease. Workstation performed: [employer]       Lab Results: I have reviewed the following results:  Results from last 7 days   Lab Units 07/04/25 0600 07/03/25 0459 07/02/25 1902   WBC Thousand/uL 9.76 10.76* 11.90*   HEMOGLOBIN g/dL 13.2 12.0 14.3   HEMATOCRIT % 41.4 38.1 44.1   MCV fL 81* 81* 79*   PLATELETS Thousands/uL 327 301 393*   INR   --   --  1.02     Results from last 7 days   Lab Units 07/04/25 0600 07/03/25 0459 07/03/25  0004 07/02/25 1902   SODIUM mmol/L 139 138 136 137   POTASSIUM mmol/L 4.1 3.5 3.5 3.9   CHLORIDE mmol/L 109* 109* 107 103   CO2 mmol/L 23 20* 20* 22   BUN mg/dL 21 40* 45* 47*   CREATININE mg/dL 0.98 1.38* 1.68* 2.17*   CALCIUM mg/dL 9.1 8.5 8.4 9.8   ALBUMIN g/dL  --  3.4*  --  4.3   TOTAL BILIRUBIN mg/dL  --  0.53  --  0.65   ALK PHOS U/L  --  60  --  80   ALT U/L  --  16  --  20   AST U/L  --  17  --  19   EGFR ml/min/1.73sq m 78 51 40 29   GLUCOSE RANDOM mg/dL 92 98 119 94         Results from last 7 days   Lab Units 07/02/25 2028 07/02/25 1902   HS TNI 0HR ng/L  --  9   HS TNI 2HR ng/L 8  --                       Results from last 7 days   Lab Units 07/03/25 0459   TSH 3RD GENERATON uIU/mL 0.919               Results from last 7 days   Lab Units 07/02/25 1935   COLOR UA  Dark Yellow   CLARITY UA  Clear   SPEC GRAV UA  >=1.030   PH UA  5.5   LEUKOCYTES UA  Trace*   NITRITE UA  Negative   GLUCOSE UA mg/dl 1000 (1%)*   KETONES UA mg/dl Negative   BLOOD UA  Negative      Results from last 7  "days   Lab Units 07/02/25 1935   RBC UA /hpf 0-1   WBC UA /hpf 1-2   EPITHELIAL CELLS WET PREP /hpf Occasional   BACTERIA UA /hpf Moderate*   OTHER OBSERVATIONS  Sperm Present      Results from last 7 days   Lab Units 07/02/25 2028   INFLUENZA A PCR  Negative     Results from last 7 days   Lab Units 07/02/25 2028   SARS-COV-2  Negative   INFLUENZA A PCR  Negative   INFLUENZA B PCR  Negative   RSV PCR  Negative       Incidental Findings:      Test Results Pending at Discharge (will require follow up):      Reason for Admission:   Palpitations (C/o numerous symptoms for about a week, palpitations, shortness of breath, chest tightness)    Hospital Course:   Robin Stephens is a 69 y.o. male patient who originally presented to the hospital on 7/2/2025 due to palpitations and weakness.  He was found to have kidney injury.  During hospitalization he was on gentle fluids and losartan and HCTZ were held.  His vitals have been stabilized.  He does report weight loss recently and therefore likely requires less BP medications.  Moving forward his amlodipine will be held along with his jardiance and ibuprofen.    Please see above list of diagnoses and related plan for additional information.     Condition at Discharge: stable     Discharge Day Visit / Exam:   Subjective: Patient seen and examined.  Feeling okay.    Vitals: Blood Pressure: 127/77 (07/04/25 0819)  Pulse: 81 (07/04/25 0819)  Temperature: 98.1 °F (36.7 °C) (07/03/25 2259)  Temp Source: Oral (07/02/25 2108)  Respirations: 18 (07/03/25 1442)  Height: 5' 6\" (167.6 cm) (07/02/25 2108)  Weight - Scale: 70.9 kg (156 lb 6.4 oz) (07/04/25 0600)  SpO2: 97 % (07/04/25 0819)    Exam:   Physical Exam  Vitals reviewed.   Constitutional:       General: He is not in acute distress.  HENT:      Head: Atraumatic.     Eyes:      Extraocular Movements: Extraocular movements intact.       Cardiovascular:      Rate and Rhythm: Regular rhythm.      Heart sounds:      No gallop. "   Pulmonary:      Effort: Pulmonary effort is normal. No respiratory distress.   Abdominal:      General: Bowel sounds are normal.      Palpations: Abdomen is soft.      Tenderness: There is no abdominal tenderness.     Musculoskeletal:         General: No tenderness or deformity.     Skin:     General: Skin is warm.      Coloration: Skin is not jaundiced.     Neurological:      General: No focal deficit present.      Mental Status: He is alert.      Motor: No weakness.     Psychiatric:         Mood and Affect: Mood normal.       Discussion with Family: Spouse on telephone    Discharge instructions/Information to patient and family:   See after visit summary for information provided to patient and family.      Provisions for Follow-Up Care:  See after visit summary for information related to follow-up care and any pertinent home health orders.      Mobility at time of Discharge:  Basic Mobility Inpatient Raw Score: 24  JH-HLM Goal: 8: Walk 250 feet or more  JH-HLM Achieved: 7: Walk 25 feet or more  JH-HLM Goal achieved. Continue to encourage appropriate mobility.    Disposition:   Home    Planned Readmission: No    Administrative Statements   I spent 35 minutes discharging the patient. This time was spent on the day of discharge. I had direct contact with the patient on the day of discharge. Greater than 50% of the total time was spent examining patient, answering all patient questions, arranging and discussing plan of care with patient as well as directly providing post-discharge instructions.  Additional time then spent on discharge activities.    **Please Note: This note may have been constructed using a voice recognition system**

## 2025-07-04 NOTE — PLAN OF CARE
Problem: PAIN - ADULT  Goal: Verbalizes/displays adequate comfort level or baseline comfort level  Description: Interventions:  - Encourage patient to monitor pain and request assistance  - Assess pain using appropriate pain scale  - Administer analgesics as ordered based on type and severity of pain and evaluate response  - Implement non-pharmacological measures as appropriate and evaluate response  - Consider cultural and social influences on pain and pain management  - Notify physician/advanced practitioner if interventions unsuccessful or patient reports new pain  - Educate patient/family on pain management process including their role and importance of  reporting pain   - Provide non-pharmacologic/complimentary pain relief interventions  Outcome: Progressing     Problem: INFECTION - ADULT  Goal: Absence or prevention of progression during hospitalization  Description: INTERVENTIONS:  - Assess and monitor for signs and symptoms of infection  - Monitor lab/diagnostic results  - Monitor all insertion sites, i.e. indwelling lines, tubes, and drains  - Monitor endotracheal if appropriate and nasal secretions for changes in amount and color  - [place] appropriate cooling/warming therapies per order  - Administer medications as ordered  - Instruct and encourage patient and family to use good hand hygiene technique  - Identify and instruct in appropriate isolation precautions for identified infection/condition  Outcome: Progressing  Goal: Absence of fever/infection during neutropenic period  Description: INTERVENTIONS:  - Monitor WBC  - Perform strict hand hygiene  - Limit to healthy visitors only  - No plants, dried, fresh or silk flowers with lin in patient room  Outcome: Progressing     Problem: SAFETY ADULT  Goal: Patient will remain free of falls  Description: INTERVENTIONS:  - Educate patient/family on patient safety including physical limitations  - Instruct patient to call for assistance with activity   -  Consider consulting OT/PT to assist with strengthening/mobility based on AM PAC & JH-HLM score  - Consult OT/PT to assist with strengthening/mobility   - Keep Call bell within reach  - Keep bed low and locked with side rails adjusted as appropriate  - Keep care items and personal belongings within reach  - Initiate and maintain comfort rounds  - Make Fall Risk Sign visible to staff  - Offer Toileting every 2 Hours, in advance of need  - Initiate/Maintain bed alarm  - Obtain necessary fall risk management equipment: slipper socks  - Apply yellow socks and bracelet for high fall risk patients  - Consider moving patient to room near nurses station  Outcome: Progressing  Goal: Maintain or return to baseline ADL function  Description: INTERVENTIONS:  -  Assess patient's ability to carry out ADLs; assess patient's baseline for ADL function and identify physical deficits which impact ability to perform ADLs (bathing, care of mouth/teeth, toileting, grooming, dressing, etc.)  - Assess/evaluate cause of self-care deficits   - Assess range of motion  - Assess patient's mobility; develop plan if impaired  - Assess patient's need for assistive devices and provide as appropriate  - Encourage maximum independence but intervene and supervise when necessary  - Involve family in performance of ADLs  - Assess for home care needs following discharge   - Consider OT consult to assist with ADL evaluation and planning for discharge  - Provide patient education as appropriate  - Monitor functional capacity and physical performance, use of AM PAC & JH-HLM   - Monitor gait, balance and fatigue with ambulation    Outcome: Progressing  Goal: Maintains/Returns to pre admission functional level  Description: INTERVENTIONS:  - Perform AM-PAC 6 Click Basic Mobility/ Daily Activity assessment daily.  - Set and communicate daily mobility goal to care team and patient/family/caregiver.   - Collaborate with rehabilitation services on mobility goals  if consulted  - Perform Range of Motion 3 times a day.  - Reposition patient every 2 hours.  - Dangle patient 3 times a day  - Stand patient 3 times a day  - Ambulate patient 3 times a day  - Out of bed to chair 3 times a day   - Out of bed for meals 3 times a day  - Out of bed for toileting  - Record patient progress and toleration of activity level   Outcome: Progressing     Problem: DISCHARGE PLANNING  Goal: Discharge to home or other facility with appropriate resources  Description: INTERVENTIONS:  - Identify barriers to discharge w/patient and caregiver  - Arrange for needed discharge resources and transportation as appropriate  - Identify discharge learning needs (meds, wound care, etc.)  - Arrange for interpretive services to assist at discharge as needed  - Refer to Case Management Department for coordinating discharge planning if the patient needs post-hospital services based on physician/advanced practitioner order or complex needs related to functional status, cognitive ability, or social support system  Outcome: Progressing     Problem: Knowledge Deficit  Goal: Patient/family/caregiver demonstrates understanding of disease process, treatment plan, medications, and discharge instructions  Description: Complete learning assessment and assess knowledge base.  Interventions:  - Provide teaching at level of understanding  - Provide teaching via preferred learning methods  Outcome: Progressing

## 2025-07-04 NOTE — ASSESSMENT & PLAN NOTE
Prediabetes recently started on jardiance.  Advised holding until okay to restart as an outpatient

## 2025-07-04 NOTE — ASSESSMENT & PLAN NOTE
Patient on multiple medications.  Reports losing weight recently  Due to kidney injury avoiding hypotension.  Hold on further amlodipine.  Can continue losartan hydrochlorothiazide eplerenone and metoprolol for cardiac reasons

## 2025-07-04 NOTE — DISCHARGE INSTR - AVS FIRST PAGE
Acute kidney injury.  Etiology: Jardiance vs hypotension.  Hold Jardiance for now.  Avoid ibuprofen  Primary hypertension.  Hold amlodipine for now.

## 2025-07-04 NOTE — ASSESSMENT & PLAN NOTE
>>ASSESSMENT AND PLAN FOR HTN (HYPERTENSION) WRITTEN ON 7/4/2025  1:10 PM BY MEGHNA PAREKH DO    Patient on multiple medications.  Reports losing weight recently  Due to kidney injury avoiding hypotension.  Hold on further amlodipine.  Can continue losartan hydrochlorothiazide eplerenone and metoprolol for cardiac reasons

## 2025-07-07 ENCOUNTER — TRANSITIONAL CARE MANAGEMENT (OUTPATIENT)
Dept: FAMILY MEDICINE CLINIC | Facility: CLINIC | Age: 70
End: 2025-07-07

## 2025-07-07 ENCOUNTER — OFFICE VISIT (OUTPATIENT)
Dept: FAMILY MEDICINE CLINIC | Facility: CLINIC | Age: 70
End: 2025-07-07
Payer: MEDICARE

## 2025-07-07 VITALS
OXYGEN SATURATION: 99 % | DIASTOLIC BLOOD PRESSURE: 60 MMHG | SYSTOLIC BLOOD PRESSURE: 120 MMHG | RESPIRATION RATE: 18 BRPM | TEMPERATURE: 97.6 F | BODY MASS INDEX: 25.23 KG/M2 | WEIGHT: 157 LBS | HEIGHT: 66 IN | HEART RATE: 69 BPM

## 2025-07-07 DIAGNOSIS — N17.9 AKI (ACUTE KIDNEY INJURY) (HCC): ICD-10-CM

## 2025-07-07 DIAGNOSIS — K57.30 DIVERTICULOSIS OF COLON: ICD-10-CM

## 2025-07-07 DIAGNOSIS — K21.00 GASTROESOPHAGEAL REFLUX DISEASE WITH ESOPHAGITIS WITHOUT HEMORRHAGE: ICD-10-CM

## 2025-07-07 DIAGNOSIS — I25.10 CORONARY ARTERY DISEASE INVOLVING NATIVE CORONARY ARTERY OF NATIVE HEART WITHOUT ANGINA PECTORIS: ICD-10-CM

## 2025-07-07 DIAGNOSIS — I25.119 ATHEROSCLEROSIS OF NATIVE CORONARY ARTERY OF NATIVE HEART WITH ANGINA PECTORIS (HCC): ICD-10-CM

## 2025-07-07 DIAGNOSIS — R63.4 WEIGHT LOSS: ICD-10-CM

## 2025-07-07 DIAGNOSIS — E78.2 MIXED HYPERLIPIDEMIA: ICD-10-CM

## 2025-07-07 DIAGNOSIS — I10 BENIGN ESSENTIAL HYPERTENSION: Primary | ICD-10-CM

## 2025-07-07 DIAGNOSIS — I1A.0 RESISTANT HYPERTENSION: ICD-10-CM

## 2025-07-07 PROCEDURE — 99496 TRANSJ CARE MGMT HIGH F2F 7D: CPT | Performed by: FAMILY MEDICINE

## 2025-07-07 NOTE — ASSESSMENT & PLAN NOTE
Pt is now on repatha [facility] I get next set of labs will almost def stop the zetia

## 2025-07-07 NOTE — PROGRESS NOTES
Name: Robin Stephens      : 1955      MRN: [id number]  Encounter Provider: Frank Lombardi, DO  Encounter Date: 2025   Encounter department: [facility]  :  Assessment & Plan  Benign essential hypertension  STACEY is stable off the norvasc       Resistant hypertension         Coronary artery disease involving native coronary artery of native heart without angina pectoris  No CP       Mixed hyperlipidemia  Pt is now on repatha onmce I get next set of labs will almost def stop the zetia       EMILY (acute kidney injury) (Formerly [facility])  Labs have recovered  Jardiance stopped       Atherosclerosis of native coronary artery of native heart with angina pectoris (HCC)    Orders:  •  CT abdomen pelvis wo contrast; Future    Weight loss  PT has had weight loss , very concerned   It seem sto be more mental PTSD etc - 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  Will get a CT scan to make sure - pt was advised about exercise and the gym - He will start 30 min a day 5 days a week  Orders:  •  CT abdomen pelvis wo contrast; Future    Diverticulosis of colon    Orders:  •  CT abdomen pelvis wo contrast; Future    Gastroesophageal reflux disease with esophagitis without hemorrhage    Orders:  •  CT abdomen pelvis wo contrast; Future           History of Present Illness   Pt is here for a TCM  Nurses TCM note appreciated    Wife is concerned with weight loss - pt is not tryiong to loose weight and states he has PTSD - after his deploym,ent - but still he is not rying  Colon is up to date  No abdominal pain        Review of Systems   Constitutional:  Positive for unexpected weight change. Negative for activity change, appetite change, chills, diaphoresis, fatigue and fever.   HENT:  Negative for congestion, dental problem, ear pain, mouth sores, sinus pressure, sinus pain, sore throat and trouble swallowing.    Eyes:  Negative for photophobia, discharge and itching.   Respiratory:  Negative for apnea, chest tightness and shortness of  "breath.    Cardiovascular:  Negative for chest pain, palpitations and leg swelling.   Gastrointestinal:  Negative for abdominal distention, abdominal pain, blood in stool, nausea and vomiting.   Endocrine: Negative for cold intolerance, heat intolerance, polydipsia, polyphagia and polyuria.   Genitourinary:  Negative for difficulty urinating.   Musculoskeletal:  Negative for arthralgias.   Skin:  Negative for color change and wound.   Neurological:  Negative for dizziness, syncope, speech difficulty and headaches.   Hematological:  Negative for adenopathy.   Psychiatric/Behavioral:  Negative for agitation and behavioral problems.        Objective   /60   Pulse 69   Temp 97.6 °F (36.4 °C)   Resp 18   Ht 5' 6\" (1.676 m)   Wt 71.2 kg (157 lb)   SpO2 99%   BMI 25.34 kg/m²      Physical Exam  Vitals and nursing note reviewed.   Constitutional:       General: He is not in acute distress.     Appearance: He is well-developed. He is not diaphoretic.   HENT:      Head: Normocephalic and atraumatic.      Right Ear: External ear normal.      Left Ear: External ear normal.      Nose: Nose normal.      Mouth/Throat:      Pharynx: No oropharyngeal exudate.     Eyes:      General: No scleral icterus.        Right eye: No discharge.         Left eye: No discharge.      Pupils: Pupils are equal, round, and reactive to light.     Neck:      Thyroid: No thyromegaly.     Cardiovascular:      Rate and Rhythm: Normal rate.      Heart sounds: Normal heart sounds. No murmur heard.  Pulmonary:      Effort: Pulmonary effort is normal. No respiratory distress.      Breath sounds: Normal breath sounds. No wheezing.   Abdominal:      General: Bowel sounds are normal. There is no distension.      Palpations: Abdomen is soft. There is no mass.      Tenderness: There is no abdominal tenderness. There is no guarding or rebound.     Musculoskeletal:         General: Normal range of motion.     Skin:     General: Skin is warm and dry.    "   Findings: No erythema or rash.     Neurological:      Mental Status: He is alert.      Coordination: Coordination normal.      Deep Tendon Reflexes: Reflexes normal.     Psychiatric:         Behavior: Behavior normal.

## 2025-07-07 NOTE — TELEPHONE ENCOUNTER
Pt's spouse called.  She said Dr. Glasgow wants pt to come in for a sooner visit, hospital f/u.  I attempted to schedule an appt, but nothing sooner. Pt has appt 10/24.  Advised I would send a message to scheduling team to assist in getting pt in sooner if possible.

## 2025-07-14 ENCOUNTER — HOSPITAL ENCOUNTER (OUTPATIENT)
Dept: RADIOLOGY | Facility: HOSPITAL | Age: 70
Discharge: HOME | End: 2025-07-14
Attending: FAMILY MEDICINE
Payer: MEDICARE

## 2025-07-14 DIAGNOSIS — I25.119 ATHEROSCLEROSIS OF NATIVE CORONARY ARTERY OF NATIVE HEART WITH ANGINA PECTORIS (HCC): ICD-10-CM

## 2025-07-14 DIAGNOSIS — R63.4 WEIGHT LOSS: ICD-10-CM

## 2025-07-14 DIAGNOSIS — K21.00 GASTROESOPHAGEAL REFLUX DISEASE WITH ESOPHAGITIS WITHOUT HEMORRHAGE: ICD-10-CM

## 2025-07-14 DIAGNOSIS — K57.30 DIVERTICULOSIS OF COLON: ICD-10-CM

## 2025-07-14 PROCEDURE — 74176 CT ABD & PELVIS W/O CONTRAST: CPT

## 2025-07-23 DIAGNOSIS — R63.4 WEIGHT LOSS: ICD-10-CM

## 2025-07-23 DIAGNOSIS — K76.9 LIVER LESION: ICD-10-CM

## 2025-07-23 DIAGNOSIS — K63.9 COLON WALL THICKENING: Primary | ICD-10-CM

## 2025-07-29 ENCOUNTER — OFFICE VISIT (OUTPATIENT)
Dept: SLEEP CENTER | Facility: CLINIC | Age: 70
End: 2025-07-29
Payer: MEDICARE

## 2025-07-29 VITALS
WEIGHT: 160 LBS | BODY MASS INDEX: 25.71 KG/M2 | SYSTOLIC BLOOD PRESSURE: 122 MMHG | DIASTOLIC BLOOD PRESSURE: 68 MMHG | OXYGEN SATURATION: 97 % | HEIGHT: 66 IN | HEART RATE: 67 BPM

## 2025-07-29 DIAGNOSIS — G47.19 EXCESSIVE DAYTIME SLEEPINESS: ICD-10-CM

## 2025-07-29 DIAGNOSIS — I1A.0 RESISTANT HYPERTENSION: ICD-10-CM

## 2025-07-29 DIAGNOSIS — G47.33 OSA (OBSTRUCTIVE SLEEP APNEA): Primary | ICD-10-CM

## 2025-07-29 DIAGNOSIS — F33.1 MAJOR DEPRESSIVE DISORDER, RECURRENT, MODERATE (HCC): ICD-10-CM

## 2025-07-29 PROCEDURE — G2211 COMPLEX E/M VISIT ADD ON: HCPCS | Performed by: STUDENT IN AN ORGANIZED HEALTH CARE EDUCATION/TRAINING PROGRAM

## 2025-07-29 PROCEDURE — 99204 OFFICE O/P NEW MOD 45 MIN: CPT | Performed by: STUDENT IN AN ORGANIZED HEALTH CARE EDUCATION/TRAINING PROGRAM

## 2025-08-04 DIAGNOSIS — E78.2 MIXED HYPERLIPIDEMIA: ICD-10-CM

## 2025-08-04 DIAGNOSIS — I25.10 CORONARY ARTERY DISEASE INVOLVING NATIVE CORONARY ARTERY OF NATIVE HEART WITHOUT ANGINA PECTORIS: ICD-10-CM

## 2025-08-05 RX ORDER — EVOLOCUMAB 140 MG/ML
INJECTION, SOLUTION SUBCUTANEOUS
Qty: 6 ML | Refills: 1 | Status: SHIPPED | OUTPATIENT
Start: 2025-08-05

## 2025-08-12 ENCOUNTER — OFFICE VISIT (OUTPATIENT)
Dept: CARDIOLOGY CLINIC | Facility: CLINIC | Age: 70
End: 2025-08-12
Payer: MEDICARE

## 2025-08-13 ENCOUNTER — OFFICE VISIT (OUTPATIENT)
Dept: FAMILY MEDICINE CLINIC | Facility: CLINIC | Age: 70
End: 2025-08-13
Payer: MEDICARE